# Patient Record
Sex: MALE | Race: WHITE | NOT HISPANIC OR LATINO | Employment: UNEMPLOYED | RURAL
[De-identification: names, ages, dates, MRNs, and addresses within clinical notes are randomized per-mention and may not be internally consistent; named-entity substitution may affect disease eponyms.]

---

## 2022-02-02 ENCOUNTER — OFFICE VISIT (OUTPATIENT)
Dept: FAMILY MEDICINE CLINIC | Facility: CLINIC | Age: 27
End: 2022-02-02

## 2022-02-02 VITALS
OXYGEN SATURATION: 98 % | WEIGHT: 213 LBS | DIASTOLIC BLOOD PRESSURE: 84 MMHG | SYSTOLIC BLOOD PRESSURE: 129 MMHG | HEART RATE: 99 BPM | TEMPERATURE: 98.2 F | BODY MASS INDEX: 34.23 KG/M2 | HEIGHT: 66 IN

## 2022-02-02 DIAGNOSIS — R00.2 PALPITATIONS: ICD-10-CM

## 2022-02-02 DIAGNOSIS — R42 DIZZINESS: ICD-10-CM

## 2022-02-02 DIAGNOSIS — Z11.59 ENCOUNTER FOR HEPATITIS C SCREENING TEST FOR LOW RISK PATIENT: ICD-10-CM

## 2022-02-02 DIAGNOSIS — E66.9 CLASS 1 OBESITY WITHOUT SERIOUS COMORBIDITY WITH BODY MASS INDEX (BMI) OF 34.0 TO 34.9 IN ADULT, UNSPECIFIED OBESITY TYPE: ICD-10-CM

## 2022-02-02 DIAGNOSIS — R53.83 FATIGUE, UNSPECIFIED TYPE: ICD-10-CM

## 2022-02-02 DIAGNOSIS — G91.9 HYDROCEPHALUS, UNSPECIFIED TYPE: ICD-10-CM

## 2022-02-02 DIAGNOSIS — R26.89 POOR BALANCE: ICD-10-CM

## 2022-02-02 DIAGNOSIS — Z00.00 ANNUAL PHYSICAL EXAM: Primary | ICD-10-CM

## 2022-02-02 DIAGNOSIS — Z13.220 LIPID SCREENING: ICD-10-CM

## 2022-02-02 DIAGNOSIS — R06.02 SHORTNESS OF BREATH: ICD-10-CM

## 2022-02-02 PROBLEM — IMO0002 CHIARI MALFORMATION: Status: ACTIVE | Noted: 2021-04-12

## 2022-02-02 LAB
BILIRUB BLD-MCNC: NEGATIVE MG/DL
CLARITY, POC: CLEAR
COLOR UR: ABNORMAL
GLUCOSE UR STRIP-MCNC: NEGATIVE MG/DL
KETONES UR QL: ABNORMAL
LEUKOCYTE EST, POC: NEGATIVE
NITRITE UR-MCNC: NEGATIVE MG/ML
PH UR: 5.5 [PH] (ref 5–8)
PROT UR STRIP-MCNC: ABNORMAL MG/DL
RBC # UR STRIP: NEGATIVE /UL
SP GR UR: 1.03 (ref 1–1.03)
UROBILINOGEN UR QL: NORMAL

## 2022-02-02 PROCEDURE — 93000 ELECTROCARDIOGRAM COMPLETE: CPT | Performed by: NURSE PRACTITIONER

## 2022-02-02 PROCEDURE — 99385 PREV VISIT NEW AGE 18-39: CPT | Performed by: NURSE PRACTITIONER

## 2022-02-02 PROCEDURE — 81003 URINALYSIS AUTO W/O SCOPE: CPT | Performed by: NURSE PRACTITIONER

## 2022-02-02 RX ORDER — TOPIRAMATE 100 MG/1
125 TABLET, FILM COATED ORAL 2 TIMES DAILY
COMMUNITY
Start: 2021-12-10

## 2022-02-02 RX ORDER — DIAZEPAM 5 MG/1
5 TABLET ORAL 3 TIMES DAILY PRN
COMMUNITY
Start: 2022-01-08 | End: 2022-03-21

## 2022-02-02 RX ORDER — ONDANSETRON 4 MG/1
4 TABLET, FILM COATED ORAL EVERY 6 HOURS PRN
COMMUNITY
Start: 2022-01-08 | End: 2022-08-04

## 2022-02-02 RX ORDER — BUTALBITAL, ACETAMINOPHEN, CAFFEINE AND CODEINE PHOSPHATE 300; 50; 40; 30 MG/1; MG/1; MG/1; MG/1
CAPSULE ORAL EVERY 6 HOURS
COMMUNITY
End: 2023-02-03

## 2022-02-02 RX ORDER — GABAPENTIN 300 MG/1
300 CAPSULE ORAL 3 TIMES DAILY
COMMUNITY
Start: 2022-02-01

## 2022-02-02 RX ORDER — DULOXETIN HYDROCHLORIDE 60 MG/1
60 CAPSULE, DELAYED RELEASE ORAL DAILY
COMMUNITY
Start: 2021-12-16 | End: 2022-03-15 | Stop reason: SDUPTHER

## 2022-02-02 NOTE — PROGRESS NOTES
Procedure     ECG 12 Lead    Date/Time: 2/2/2022 10:59 AM  Performed by: Violeta Rose MA  Authorized by: Delilah Kulkarni APRN   Comparison: not compared with previous ECG   Previous ECG: no previous ECG available  Rhythm: sinus rhythm  Rate: normal  BPM: 86  Conduction: non-specific intraventricular conduction delay  ST Segments: ST segments normal  Other: no other findings    Clinical impression: non-specific ECG

## 2022-02-02 NOTE — PROGRESS NOTES
CC: establish care    History:  Curtis Garsia is a 26 y.o. male who presents today for evaluation of the above problems.      Chiari malformation decompression in 2011 and revision in 2021.  Previous patient of Dr. Marcial and he retired.   Complains of shortness of breath - gets winded easily since his last shunt placement. Has also been having some tachycardia and palpitations. This has started since his most recent operation in May of 2021.   Most recent decompression was done at [facility] by Dr. Parks.     Has days where he has dizziness and feels that his balance is off.  This is sporadic and not constant. Dr. Parks surgeon and Dr. Parks neurologist did give him some exercises to do and these have helped a little.    Stabbed in eye in [id number] by fingernail.   Has blurred vision from this at times. Has poor eyesight anyway. Does have blurriness that comes and goes.     Does not want flu shot.     Has tried clonazepam, however, felt that it increased his somnolence too much. This was ordered by Dr. Jan Parks who wanted to change from valium to this in November.  Dr. Marcial has been ordering valium. It has been taken for a combination of insomnia and anxiety.     HPI  ROS:  Review of Systems   Constitutional: Positive for fatigue.   Eyes: Positive for visual disturbance (blurred at times).   Respiratory: Positive for shortness of breath.    Cardiovascular: Positive for palpitations.   Gastrointestinal: Positive for diarrhea (occasional).   Neurological: Positive for dizziness and headaches.        Short term memory issues   Psychiatric/Behavioral: Positive for dysphoric mood. The patient is nervous/anxious.        No Known Allergies  Past Medical History:   Diagnosis Date   • Anxiety    • Balance disorder    • Chiari I malformation (HCC)    • Cholelithiasis      Past Surgical History:   Procedure Laterality Date   • NERVE DECOMPRESSION  2011   • SHUNT REVISION       Family History   Problem  "Relation Age of Onset   • Chiari malformation Mother    • Stroke Mother    • Heart disease Mother    • ADD / ADHD Father    • Heart disease Father    • Heart disease Brother    • Thyroid disease Maternal Aunt    • Heart disease Maternal Aunt    • Hyperlipidemia Maternal Uncle    • No Known Problems Paternal Aunt    • ADD / ADHD Paternal Uncle    • Heart disease Paternal Uncle    • Diabetes Paternal Uncle    • No Known Problems Maternal Grandmother    • Heart disease Maternal Grandfather    • Diabetes Paternal Grandmother    • Heart disease Paternal Grandfather       reports that he has never smoked. He has never used smokeless tobacco. He reports previous alcohol use. He reports that he does not use drugs.      Current Outpatient Medications:   •  Butalbital-APAP-Caff-Cod (Fioricet/Codeine) -57-30 MG capsule, Every 6 (Six) Hours., Disp: , Rfl:   •  diazePAM (VALIUM) 5 MG tablet, Take 5 mg by mouth 3 (Three) Times a Day As Needed for Anxiety or Pain., Disp: , Rfl:   •  DULoxetine (CYMBALTA) 60 MG capsule, Take 60 mg by mouth Daily., Disp: , Rfl:   •  gabapentin (NEURONTIN) 300 MG capsule, Take 300 mg by mouth 3 (Three) Times a Day., Disp: , Rfl:   •  ondansetron (ZOFRAN) 4 MG tablet, Take 4 mg by mouth Every 6 (Six) Hours As Needed for Nausea., Disp: , Rfl:   •  topiramate (TOPAMAX) 100 MG tablet, Take 50 mg by mouth 2 (Two) Times a Day., Disp: , Rfl:     OBJECTIVE:  /84 (BP Location: Right arm, Patient Position: Sitting, Cuff Size: Large Adult)   Pulse 99   Temp 98.2 °F (36.8 °C) (Temporal)   Ht 166.4 cm (65.5\")   Wt 96.6 kg (213 lb)   SpO2 98%   BMI 34.91 kg/m²    Physical Exam  Vitals reviewed.   Constitutional:       Appearance: He is well-developed.   Neck:      Vascular: No carotid bruit.   Cardiovascular:      Rate and Rhythm: Normal rate and regular rhythm.      Heart sounds: Normal heart sounds.   Pulmonary:      Effort: Pulmonary effort is normal.      Breath sounds: Normal breath sounds. "   Abdominal:      General: Abdomen is flat. Bowel sounds are normal.      Palpations: Abdomen is soft.   Neurological:      Mental Status: He is alert and oriented to person, place, and time.   Psychiatric:         Behavior: Behavior normal.         Assessment/Plan    Diagnoses and all orders for this visit:    1. Annual physical exam (Primary)  -     TSH  -     T4, free  -     CBC & Differential  -     Comprehensive Metabolic Panel  -     Lipid Panel  -     POC Urinalysis Dipstick, Multipro  -     Hepatitis C Antibody    2. Hydrocephalus, unspecified type (HCC)  -     CBC & Differential  -     Comprehensive Metabolic Panel  -     POC Urinalysis Dipstick, Multipro  -     Ambulatory Referral to Physical Therapy Evaluate and treat    3. Fatigue, unspecified type  -     CBC & Differential  -     Comprehensive Metabolic Panel  -     POC Urinalysis Dipstick, Multipro    4. Palpitations  -     TSH  -     T4, free  -     CBC & Differential  -     Comprehensive Metabolic Panel  -     POC Urinalysis Dipstick, Multipro  -     Ambulatory Referral to Cardiology    5. Dizziness  -     TSH  -     T4, free  -     CBC & Differential  -     Comprehensive Metabolic Panel  -     Ambulatory Referral to Physical Therapy Evaluate and treat  -     ToxASSURE Select 13 (MW) - Urine, Clean Catch    6. Lipid screening  -     Lipid Panel    7. Class 1 obesity without serious comorbidity with body mass index (BMI) of 34.0 to 34.9 in adult, unspecified obesity type  -     TSH  -     T4, free    8. Encounter for hepatitis C screening test for low risk patient  -     Hepatitis C Antibody    9. Shortness of breath  -     Ambulatory Referral to Cardiology    10. Poor balance  -     Ambulatory Referral to Physical Therapy Evaluate and treat  -     ToxASSURE Select 13 (MW) - Urine, Clean Catch    PT for balance. Cardiology for palpitation and breathlessness.         An After Visit Summary was printed and given to the patient at discharge.  Return in  about 3 months (around 5/2/2022) for Next scheduled follow up - controlled med monitoring.       Delilah Kulkarni, APRN 2/2/22    Electronically signed.

## 2022-02-03 LAB
ALBUMIN SERPL-MCNC: 4.6 G/DL (ref 3.5–5.2)
ALBUMIN/GLOB SERPL: 1.8 G/DL
ALP SERPL-CCNC: 133 U/L (ref 39–117)
ALT SERPL-CCNC: 19 U/L (ref 1–41)
AST SERPL-CCNC: 16 U/L (ref 1–40)
BASOPHILS # BLD AUTO: 0.08 10*3/MM3 (ref 0–0.2)
BASOPHILS NFR BLD AUTO: 1.1 % (ref 0–1.5)
BILIRUB SERPL-MCNC: 0.2 MG/DL (ref 0–1.2)
BUN SERPL-MCNC: 16 MG/DL (ref 6–20)
BUN/CREAT SERPL: 17 (ref 7–25)
CALCIUM SERPL-MCNC: 9.2 MG/DL (ref 8.6–10.5)
CHLORIDE SERPL-SCNC: 107 MMOL/L (ref 98–107)
CHOLEST SERPL-MCNC: 201 MG/DL (ref 0–200)
CO2 SERPL-SCNC: 24.7 MMOL/L (ref 22–29)
CREAT SERPL-MCNC: 0.94 MG/DL (ref 0.76–1.27)
EOSINOPHIL # BLD AUTO: 0.49 10*3/MM3 (ref 0–0.4)
EOSINOPHIL NFR BLD AUTO: 6.8 % (ref 0.3–6.2)
ERYTHROCYTE [DISTWIDTH] IN BLOOD BY AUTOMATED COUNT: 13.4 % (ref 12.3–15.4)
GLOBULIN SER CALC-MCNC: 2.5 GM/DL
GLUCOSE SERPL-MCNC: 88 MG/DL (ref 65–99)
HCT VFR BLD AUTO: 39 % (ref 37.5–51)
HCV AB S/CO SERPL IA: <0.1 S/CO RATIO (ref 0–0.9)
HDLC SERPL-MCNC: 52 MG/DL (ref 40–60)
HGB BLD-MCNC: 12.2 G/DL (ref 13–17.7)
IMM GRANULOCYTES # BLD AUTO: 0.04 10*3/MM3 (ref 0–0.05)
IMM GRANULOCYTES NFR BLD AUTO: 0.6 % (ref 0–0.5)
LDLC SERPL CALC-MCNC: 130 MG/DL (ref 0–100)
LYMPHOCYTES # BLD AUTO: 1.4 10*3/MM3 (ref 0.7–3.1)
LYMPHOCYTES NFR BLD AUTO: 19.4 % (ref 19.6–45.3)
MCH RBC QN AUTO: 25.4 PG (ref 26.6–33)
MCHC RBC AUTO-ENTMCNC: 31.3 G/DL (ref 31.5–35.7)
MCV RBC AUTO: 81.3 FL (ref 79–97)
MONOCYTES # BLD AUTO: 0.82 10*3/MM3 (ref 0.1–0.9)
MONOCYTES NFR BLD AUTO: 11.4 % (ref 5–12)
NEUTROPHILS # BLD AUTO: 4.38 10*3/MM3 (ref 1.7–7)
NEUTROPHILS NFR BLD AUTO: 60.7 % (ref 42.7–76)
NRBC BLD AUTO-RTO: 0 /100 WBC (ref 0–0.2)
PLATELET # BLD AUTO: 418 10*3/MM3 (ref 140–450)
POTASSIUM SERPL-SCNC: 4.5 MMOL/L (ref 3.5–5.2)
PROT SERPL-MCNC: 7.1 G/DL (ref 6–8.5)
RBC # BLD AUTO: 4.8 10*6/MM3 (ref 4.14–5.8)
SODIUM SERPL-SCNC: 141 MMOL/L (ref 136–145)
T4 FREE SERPL-MCNC: 0.93 NG/DL (ref 0.93–1.7)
TRIGL SERPL-MCNC: 106 MG/DL (ref 0–150)
TSH SERPL DL<=0.005 MIU/L-ACNC: 4.24 UIU/ML (ref 0.27–4.2)
VLDLC SERPL CALC-MCNC: 19 MG/DL (ref 5–40)
WBC # BLD AUTO: 7.21 10*3/MM3 (ref 3.4–10.8)

## 2022-02-09 ENCOUNTER — HOSPITAL ENCOUNTER (OUTPATIENT)
Dept: PHYSICAL THERAPY | Facility: HOSPITAL | Age: 27
Setting detail: THERAPIES SERIES
Discharge: HOME | End: 2022-02-09

## 2022-02-09 DIAGNOSIS — G91.9 HYDROCEPHALUS, UNSPECIFIED TYPE: Primary | ICD-10-CM

## 2022-02-09 DIAGNOSIS — R42 DIZZINESS: ICD-10-CM

## 2022-02-09 DIAGNOSIS — R26.89 POOR BALANCE: ICD-10-CM

## 2022-02-09 LAB — DRUGS UR: NORMAL

## 2022-02-09 PROCEDURE — 97162 PT EVAL MOD COMPLEX 30 MIN: CPT | Performed by: PHYSICAL THERAPIST

## 2022-02-09 PROCEDURE — 97112 NEUROMUSCULAR REEDUCATION: CPT | Performed by: PHYSICAL THERAPIST

## 2022-02-10 DIAGNOSIS — Z51.81 THERAPEUTIC DRUG MONITORING: Primary | ICD-10-CM

## 2022-02-10 NOTE — PROGRESS NOTES
UDS was positive for marijuana. Advise that we will repeat screen in 6 weeks and again randomly after that.  If positive again we will be unable to order controlled medications.

## 2022-02-10 NOTE — THERAPY EVALUATION
Outpatient Physical Therapy Ortho Initial Evaluation  [facility]     Patient Name: Curtis Garsia  : 1995  MRN: [id number]  Today's Date: 2022      Visit Date: 2022     Attendance: 1 visits  Subjective improvement: N/A  MD appt: PRN; has referral to cardiology   Recheck due: 3/2/2022      Patient Active Problem List   Diagnosis   • Chiari malformation   • Hydrocephalus (HCC)        Past Medical History:   Diagnosis Date   • Anxiety    • Balance disorder    • Chiari I malformation (HCC)    • Cholelithiasis         Past Surgical History:   Procedure Laterality Date   • NERVE DECOMPRESSION     • SHUNT REVISION         Visit Dx:     ICD-10-CM ICD-9-CM   1. Hydrocephalus, unspecified type (HCC)  G91.9 331.4   2. Dizziness  R42 780.4   3. Poor balance  R26.89 781.99          Patient History     Row Name 22 1500 22 1914          History    Chief Complaint Balance Problems;Difficulty Walking;Difficulty with daily activities;Dizziness;Falls/history of falls;Fatigue/poor endurance;Headache;Joint stiffness;Muscle weakness;Pain  -KG Balance Problems; Difficulty Walking; Difficulty with daily activities; Dizziness; Falls/history of falls; Fatigue/poor endurance; Headache; Impaired sensation; Joint stiffness; Joint swelling; Muscle tenderness; Muscle weakness; Numbness; Pain; Problems breathing/shortness of breath; Tinglings (P)    -patient     Type of Pain Back pain;Neck pain -KG Back pain; Hand pain; Hip pain; Neck pain; Shoulder pain (P)    -patient     Date Current Problem(s) Began 2021  -KG 21 (P)    -patient     Brief Description of Current Complaint Pt presents with c/o balance issues, BLE weakness, dizziness. Pt underwent chiari malformation decompression in . After that developed hyrdrocephalus and had shunt placement 2021. In May 2021 pt had to have shunt revision. Has had issues with balance since his shunt placement. States he is  "taking a lot of medicine which may play a factor in symptoms. Pt states he has no medicine currently in his system as he wanted us to see how he is without it. States he has dealt with vision changes and his concentration and memory is poor since his surgeries. Still feels like he has some limited mobility in his neck. Look up/down hurts sometimes. If he leans forward, he feels like he is going to fall forward. Can’t squat down to pick something up without getting dizzy. Has constant HA’s daily; wakes up an goes to sleep with one. States his dizziness mainly feels wavy. Lives with his wife and step-son in single level home, 6 steps to enter. Hard to play with and  his son dues to his issues. Since his revision has noticed he gets SOA really easily and has had some palpitations. Has referral to cardiology on 2/17/2022. Pt states he has also been referred to a new neurologist who is a Chiari specialist. States his surgeon released him back in May 2021. Pt states he can sleep in the bed most nights but does have to sometimes sleep upright in his chair.  -KG Still recovering from surgery and complications (P)    -patient     Patient/Caregiver Goals right-handed  -KG Relieve pain; Improve mobility (P)    -patient     Patient/Caregiver Goals Comment \"get my balance under control\"  -KG --     Hand Dominance right-handed  -KG right-handed (P)    -patient     Occupation/sports/leisure activities -- None (P)    -patient     What clinical tests have you had for this problem? X-ray;CT scan;MRI  -KG X-ray; CT scan; MRI (P)    -patient     Results of Clinical Tests Per MD report: CT scan demonstrates shunt in place in the right ventricle. There is asymmetry of the ventricles, which is similar to prior scan in August. No significant change from that scan. Ventricles are in much better decompressed shape compared to a known malfunctioning shunt CT obtained in April 2021  -KG --            Fall Risk Assessment    Any falls in " the past year: Yes  -KG Yes (P)    -patient     Factors that contributed to the fall: Lost balance;Didn't use assistive device  -KG Lost balance; Didn't use assistive device (P)    -patient            Services    Prior Rehab/Home Health Experiences -- No (P)    -patient     Are you currently receiving Home Health services -- No (P)    -patient     Do you plan to receive Home Health services in the near future -- No (P)    -patient            Daily Activities    Primary Language -- English (P)    -patient            Safety    Are you being hurt, hit, or frightened by anyone at home or in your life? -- No (P)    -patient     Are you being neglected by a caregiver -- No (P)    -patient     Have you had any of the following issues with -- Depression; Anxiety; Panic Attacks (P)    -patient           User Key  (r) = Recorded By, (t) = Taken By, (c) = Cosigned By    Initials Name Provider Type    KG Jaja Browne, PT Physical Therapist    patient Curtis Yepez  --                 PT Ortho     Row Name 02/09/22 1500       Subjective Comments    Subjective Comments Refer to therapy pt history for details  -KG       Precautions and Contraindications    Precautions Dizziness, R ventricle shunt placement, hx of chiari decompression  -KG       Subjective Pain    Able to rate subjective pain? yes  -KG    Pre-Treatment Pain Level 0  -KG    Post-Treatment Pain Level 0  -KG       Posture/Observations    Posture/Observations Comments Decreased overall postural awareness. Slumped when seated, slightly rounded shoulders posture. Mild UE shaking/tremors noted.  -KG       General ROM    GENERAL ROM COMMENTS Bilateral UE/LE AROM WFL. Some mild limtations in cervical mobiltiy noted. Cautious with cervical flex/ext due to dizziness symptoms.  -KG       MMT (Manual Muscle Testing)    Rt Upper Ext Rt Shoulder Flexion; Rt Shoulder ABduction; Rt Elbow Flexion; Rt Elbow Extension  -KG    Lt Upper Ext Lt Shoulder Flexion; Lt Shoulder  ABduction; Lt Elbow Extension; Lt Elbow Flexion  -KG    Rt Lower Ext Rt Hip Flexion; Rt Hip ABduction; Rt Hip ADduction; Rt Knee Extension; Rt Knee Flexion; Rt Ankle Plantarflexion; Rt Ankle Dorsiflexion  -KG    Lt Lower Ext Lt Hip Flexion; Lt Hip ABduction; Lt Hip ADduction; Lt Knee Extension; Lt Knee Flexion; Lt Ankle Plantarflexion; Lt Ankle Dorsiflexion  -KG       MMT Right Upper Ext    Rt Shoulder Flexion MMT, Gross Movement (4+/5) good plus  -KG    Rt Shoulder ABduction MMT, Gross Movement (4/5) good  -KG    Rt Elbow Flexion MMT, Gross Movement: (5/5) normal  -KG    Rt Elbow Extension MMT, Gross Movement: (5/5) normal  -KG       MMT Left Upper Ext    Lt Shoulder Flexion MMT, Gross Movement (4+/5) good plus  -KG    Lt Shoulder ABduction MMT, Gross Movement (4/5) good  -KG    Lt Elbow Flexion MMT, Gross Movement (5/5) normal  -KG    Lt Elbow Extension MMT, Gross Movement (5/5) normal  -KG       MMT Right Lower Ext    Rt Hip Flexion MMT, Gross Movement (4+/5) good plus  -KG    Rt Hip ABduction MMT, Gross Movement (4/5) good  -KG    Rt Hip ADduction MMT, Gross Movement (4/5) good  -KG    Rt Knee Extension MMT, Gross Movement (5/5) normal  -KG    Rt Knee Flexion MMT, Gross Movement (5/5) normal  -KG    Rt Ankle Plantarflexion MMT, Gross Movement (4+/5) good plus  -KG    Rt Ankle Dorsiflexion MMT, Gross Movement (4+/5) good plus  -KG       MMT Left Lower Ext    Lt Hip Flexion MMT, Gross Movement (4+/5) good plus  -KG    Lt Hip ABduction MMT, Gross Movement (4/5) good  -KG    Lt Hip ADduction MMT, Gross Movement (4/5) good  -KG    Lt Knee Extension MMT, Gross Movement (5/5) normal  -KG    Lt Knee Flexion MMT, Gross Movement (5/5) normal  -KG    Lt Ankle Plantarflexion MMT, Gross Movement (4+/5) good plus  -KG    Lt Ankle Dorsiflexion MMT, Gross Movement (4+/5) good plus  -KG       Sensation    Sensation WNL? WFL  -KG    Light Touch No apparent deficits  -KG       Flexibility    Flexibility Tested? Lower Extremity;  "Upper Extremity  -KG       Upper Extremity Flexibility    Upper Trapezius Mildly limited  -KG    Pect Minor Mildly limited  -KG    Pect Major Mildly limited  -KG       Lower Extremity Flexibility    Hamstrings Bilateral:; Moderately limited  -KG       Balance Skills Training    [place]homberg Level ground: FA/EO 30\", FT/EO with increased sway. Airex: FA/EO 23\", FT/EO 26\". Level ground tandem stance: L lead 7\", R lead 21\"  -KG    Balance Comments Pt quite unsteady with increased hip strategy with tandem gait on level ground. Requires HHA at rail to avoid LOB. Challenged with gaze stabilization activites with horizonta & vertical head turns. Static balance signficantly challenged with eyes closed. Increased anterior/posterior sway. See functional outcome measures for dynamic gait index testing.  -KG       Gait/Stairs (Locomotion)    Comment (Gait/Stairs) Non-antalgic gait with no AD. Slightly narrow MARÍA. Intermittent unsteadiness with linear gait. Loses balance with turning 180 deg, matthew with quick movement.  -KG          User Key  (r) = Recorded By, (t) = Taken By, (c) = Cosigned By    Initials Name Provider Type    Jaja Desai, PT Physical Therapist                            Therapy Education  Education Details: HEP: see exercise flowsheet for details  Given: HEP, Symptoms/condition management, Pain management, Posture/body mechanics, Fall prevention and home safety  Program: New  How Provided: Verbal, Demonstration, Written  Provided to: Patient  Level of Understanding: Teach back education performed, Verbalized, Demonstrated      PT OP Goals     Row Name 02/09/22 1500          Time Calculation    PT Goal Re-Cert Due Date 03/02/22  -KG           User Key  (r) = Recorded By, (t) = Taken By, (c) = Cosigned By    Initials Name Provider Type    Jaja Desai PT Physical Therapist                 PT Assessment/Plan     Row Name 02/09/22 1500          PT Assessment    Functional Limitations Decreased safety " during functional activities; Impaired gait; Limitation in home management; Limitations in community activities; Performance in leisure activities; Performance in self-care ADL; Limitations in functional capacity and performance  -KG     Impairments Balance; Gait; Impaired muscle endurance; Impaired flexibility; Muscle strength; Pain; Poor body mechanics; Posture; Range of motion  -KG     Assessment Comments Pt is a 26 y.o. male presenting with deficits in dynamic balance, functional strength, & endurance/activity tolerance that is limiting performance of functional mobility and daily activities. Pt with hx of Chiari decompression in 2011. Underwent shunt placement in March 2021 due to hydrocephalus and revision in May 2021, which is when he reports his issues started. Pt very challenged with static balance on compliant surfaces and with activities that require narrow MARÍA. Having eyes closed significantly affects his balance. Dizziness plays more of a factor with head turns, quick direction changes, and dynamic movements. Pt with increased fall history at home. Demonstrates deficits in overall core/postural & BLE strength & stability. Pt did well with initial HEP/balance activities to HEP administration. Pt will benefit from skilled PT services to address these deficits for improvements in safety & performance with functional mobility/activities.  -KG     Rehab Potential Good  -KG     Patient/caregiver participated in establishment of treatment plan and goals Yes  -KG     Patient would benefit from skilled therapy intervention Yes  -KG            PT Plan    PT Frequency 2x/week  -KG     Predicted Duration of Therapy Intervention (PT) 12 visits  -KG     Planned CPT's? PT EVAL MOD COMPLELITY: 05472; PT RE-EVAL: 17806; PT THER PROC EA 15 MIN: 91061; PT THER ACT EA 15 MIN: 54270; PT NEUROMUSC RE-EDUCATION EA 15 MIN: 76395; PT THER SUPP EA 15 MIN  -KG     Physical Therapy Interventions (Optional Details) balance  "training; gait training; home exercise program; lumbar stabilization; modalities; neuromuscular re-education; patient/family education; postural re-education; ROM (Range of Motion); strengthening; stretching  -KG     PT Plan Comments Work on overall static/dynamic balance activities. Incorporate dual task activities and compliant surfaces. Needs work on postural/scap and core stability too. BLE functional strengthening. Postural/ education.  -KG           User Key  (r) = Recorded By, (t) = Taken By, (c) = Cosigned By    Initials Name Provider Type    KG Jaja Browne, PT Physical Therapist                   OP Exercises     Row Name 02/09/22 1500             Subjective Comments    Subjective Comments Refer to therapy pt history for details  -KG              Subjective Pain    Able to rate subjective pain? yes  -KG      Pre-Treatment Pain Level 0  -KG      Post-Treatment Pain Level 0  -KG              Exercise 1    Exercise Name 1 Sit <> stands with no UE assist  -KG      Cueing 1 Verbal  -KG      Sets 1 1  -KG      Reps 1 10  -KG              Exercise 2    Exercise Name 2 Gaze stabilization with horizontal head turns  -KG      Cueing 2 Verbal  -KG      Reps 2 1  -KG      Time 2 30\"  -KG              Exercise 3    Exercise Name 3 Gaze stabilization with vertical head turns  -KG      Cueing 3 Verbal  -KG      Reps 3 1  -KG      Time 3 30\"  -KG              Exercise 4    Exercise Name 4 Seated no moneys/posture re-ed  -KG      Cueing 4 Verbal  -KG      Sets 4 1  -KG      Reps 4 15  -KG      Time 4 3\" hold  -KG              Exercise 5    Exercise Name 5 Tandem gait at rail  -KG      Cueing 5 Verbal  -KG      Sets 5 1  -KG      Reps 5 3 laps  -KG      Additional Comments Close SBA  -KG              Exercise 6    Exercise Name 6 Tandem stance R/L lead level ground  -KG      Cueing 6 Verbal  -KG      Reps 6 1 ea  -KG      Time 6 30\"  -KG      Additional Comments Intermittent hand touches at rail, close SBA " " -KG              Exercise 7    Exercise Name 7 Level ground FT/EO  -KG      Cueing 7 Verbal  -KG      Reps 7 1  -KG      Time 7 30\"  -KG              Exercise 8    Exercise Name 8 Level ground FT/EC  -KG      Cueing 8 Verbal  -KG      Reps 8 1  -KG      Time 8 30\"  -KG      Additional Comments Close SBA/CGA  -KG              Exercise 9    Exercise Name 9 Dynamic gait index testing  -KG            User Key  (r) = Recorded By, (t) = Taken By, (c) = Cosigned By    Initials Name Provider Type    KG Jaja Browne, PT Physical Therapist                              Outcome Measure Options: Dynamic Gait Index, Dizziness Handicap Inventory (DHI: 90 = severe handicap)  Dynamic Gait Index (DGI)  Gait Level Surface: Normal: walks 20’, no assistive devices, good speed, no evidence for imbalance, normal gait pattern  Change in Gait Speed: Mild impairment: Is able to change speed but demonstrates mild gait deviations, or no gait deviations but unable to achieve a significant change in velocity, or uses as assistive device  Gait with Horizontal Head Turns: Moderate impairment: Performs head turns with moderate change in gait velocity, slows down, staggers, but recovers, can continue to walk.  Gait with Vertical Head Turns: Moderate impairment: Performs head turns with moderate change in gait velocity, slows down,staggers, but recovers, can continue to walk.  Gait and Pivot Turn: Moderate impairment: Turns slowly, requires verbal cueing, requires several small steps to catch balance following turn and stop.  Step Over Obstacle: Mild impairment: Is able to step over shoe box, but must slow down and adjust steps to clear box safely.  Step Around Obstacles: Mild impairment: Is able to step around both cones, but must slow down and adjust steps to clear cones.  Steps: Mild impairment: Alternating feet, must use rail.  Dynamic Gait Index Score: 14      Time Calculation:     Start Time: 1526  Stop Time: 1615  Time Calculation (min): " 49 min  Total Timed Code Minutes- PT: 20 minute(s)     Therapy Charges for Today     Code Description Service Date Service Provider Modifiers Qty    69992972007 HC PT EVAL MOD COMPLEXITY 2 2/9/2022 Jaja Browne, PT GP 1    03737276725 HC PT NEUROMUSC RE EDUCATION EA 15 MIN 2/9/2022 Jaja Browne, PT GP 1          PT G-Codes  Outcome Measure Options: Dynamic Gait Index, Dizziness Handicap Inventory (DHI: 90 = severe handicap)         Jaja Browne, PT  2/9/2022

## 2022-02-14 ENCOUNTER — APPOINTMENT (OUTPATIENT)
Dept: PHYSICAL THERAPY | Facility: HOSPITAL | Age: 27
End: 2022-02-14

## 2022-02-17 ENCOUNTER — HOSPITAL ENCOUNTER (OUTPATIENT)
Dept: PHYSICAL THERAPY | Facility: HOSPITAL | Age: 27
Setting detail: THERAPIES SERIES
Discharge: HOME | End: 2022-02-17

## 2022-02-17 ENCOUNTER — LAB (OUTPATIENT)
Dept: LAB | Facility: HOSPITAL | Age: 27
End: 2022-02-17

## 2022-02-17 ENCOUNTER — TELEPHONE (OUTPATIENT)
Dept: CARDIOLOGY | Facility: CLINIC | Age: 27
End: 2022-02-17

## 2022-02-17 ENCOUNTER — OFFICE VISIT (OUTPATIENT)
Dept: CARDIOLOGY | Facility: CLINIC | Age: 27
End: 2022-02-17

## 2022-02-17 VITALS
SYSTOLIC BLOOD PRESSURE: 130 MMHG | BODY MASS INDEX: 32.13 KG/M2 | HEIGHT: 68 IN | DIASTOLIC BLOOD PRESSURE: 90 MMHG | HEART RATE: 66 BPM | WEIGHT: 212 LBS | OXYGEN SATURATION: 99 %

## 2022-02-17 DIAGNOSIS — R06.09 DOE (DYSPNEA ON EXERTION): ICD-10-CM

## 2022-02-17 DIAGNOSIS — R42 DIZZINESS: ICD-10-CM

## 2022-02-17 DIAGNOSIS — G91.9 HYDROCEPHALUS, UNSPECIFIED TYPE: Primary | ICD-10-CM

## 2022-02-17 DIAGNOSIS — Z98.2 S/P VP SHUNT: ICD-10-CM

## 2022-02-17 DIAGNOSIS — R00.2 PALPITATIONS: ICD-10-CM

## 2022-02-17 DIAGNOSIS — R26.89 POOR BALANCE: ICD-10-CM

## 2022-02-17 PROBLEM — IMO0002 CHIARI MALFORMATION: Status: RESOLVED | Noted: 2021-04-12 | Resolved: 2022-02-17

## 2022-02-17 LAB
D DIMER PPP FEU-MCNC: <0.22 MG/L (FEU) (ref 0–0.5)
NT-PROBNP SERPL-MCNC: 16.5 PG/ML (ref 0–450)

## 2022-02-17 PROCEDURE — 97110 THERAPEUTIC EXERCISES: CPT

## 2022-02-17 PROCEDURE — 36415 COLL VENOUS BLD VENIPUNCTURE: CPT

## 2022-02-17 PROCEDURE — 85379 FIBRIN DEGRADATION QUANT: CPT

## 2022-02-17 PROCEDURE — 83880 ASSAY OF NATRIURETIC PEPTIDE: CPT

## 2022-02-17 PROCEDURE — 99204 OFFICE O/P NEW MOD 45 MIN: CPT | Performed by: INTERNAL MEDICINE

## 2022-02-17 PROCEDURE — 97112 NEUROMUSCULAR REEDUCATION: CPT

## 2022-02-17 NOTE — PROGRESS NOTES
Curtis Garsia  [id number]  1995  26 y.o.  male    Referring Provider: Delilah Kulkarni APRN    Reason for  Visit:  Initial visit for  shortness of breath and palpitations       Subjective    Moderate exertional shortness of breath on exertion relieved with rest  No significant cough or wheezing  Started after  shunt 05/2021    No palpitations  No associated chest pain  No significant pedal edema    No fever or chills  No significant expectoration    No hemoptysis  No presyncope or syncope    Tolerating current medications well with no untoward side effects   Compliant with prescribed medication regimen. Tries to adhere to cardiac diet.     Intermittent palpitations, once daily to several times a day lasting for less than 1 minute  Associated symptoms of dizziness, weakness  No chest pain,    Has associated shortness of breath      Some pain lateral aspect of left hip and upper thigh     History of present illness:  Curtis Garsia is a 26 y.o. yo male with hydrocephalus  who presents today for   Chief Complaint   Patient presents with   • Shortness of Breath     NEW- PATIENT STATES HE HAS BEEN SOB WITH LIGHT EXERTION AND LAST ABOUT 30 MINS, HAS SOME PALPITATIONS, DIZZINESS, HEADAHCES AND FATIGUE.    • Palpitations   .    History  Past Medical History:   Diagnosis Date   • Anxiety    • Asthma     AS A CHILD    • Balance disorder    • Chiari I malformation (HCC)    • Cholelithiasis    ,   Past Surgical History:   Procedure Laterality Date   • NERVE DECOMPRESSION  2011   • SHUNT REVISION     ,   Family History   Problem Relation Age of Onset   • Chiari malformation Mother    • Stroke Mother    • Heart disease Mother    • ADD / ADHD Father    • Heart disease Father    • Heart disease Brother    • Thyroid disease Maternal Aunt    • Heart disease Maternal Aunt    • Hyperlipidemia Maternal Uncle    • No Known Problems Paternal Aunt    • ADD / ADHD Paternal Uncle    • Heart disease Paternal Uncle    •  "Diabetes Paternal Uncle    • No Known Problems Maternal Grandmother    • Heart disease Maternal Grandfather    • Diabetes Paternal Grandmother    • Heart disease Paternal Grandfather    ,   Social History     Tobacco Use   • Smoking status: Never Smoker   • Smokeless tobacco: Never Used   Vaping Use   • Vaping Use: Never used   Substance Use Topics   • Alcohol use: Not Currently   • Drug use: Never   ,     Medications  Current Outpatient Medications   Medication Sig Dispense Refill   • Butalbital-APAP-Caff-Cod (Fioricet/Codeine) -36-30 MG capsule Every 6 (Six) Hours.     • diazePAM (VALIUM) 5 MG tablet Take 5 mg by mouth 3 (Three) Times a Day As Needed for Anxiety or Pain.     • DULoxetine (CYMBALTA) 60 MG capsule Take 60 mg by mouth Daily.     • gabapentin (NEURONTIN) 300 MG capsule Take 300 mg by mouth 3 (Three) Times a Day.     • ondansetron (ZOFRAN) 4 MG tablet Take 4 mg by mouth Every 6 (Six) Hours As Needed for Nausea.     • topiramate (TOPAMAX) 100 MG tablet Take 50 mg by mouth 2 (Two) Times a Day.       No current facility-administered medications for this visit.       Allergies:  Patient has no known allergies.    Review of Systems  Review of Systems   Constitutional: Negative.   HENT: Negative.    Eyes: Negative.    Cardiovascular: Positive for dyspnea on exertion and palpitations. Negative for chest pain, claudication, cyanosis, irregular heartbeat, leg swelling, near-syncope, orthopnea, paroxysmal nocturnal dyspnea and syncope.   Respiratory: Negative.    Endocrine: Negative.    Hematologic/Lymphatic: Negative.    Skin: Negative.    Gastrointestinal: Negative for anorexia.   Genitourinary: Negative.    Neurological: Negative.    Psychiatric/Behavioral: Negative.        Objective     Physical Exam:  /90   Pulse 66   Ht 172.7 cm (68\")   Wt 96.2 kg (212 lb)   SpO2 99%   BMI 32.23 kg/m²     Physical Exam  Constitutional:       Appearance: He is well-developed.   HENT:      Head: " Normocephalic.   Neck:      Vascular: Normal carotid pulses. No carotid bruit or JVD.      Trachea: No tracheal tenderness or tracheal deviation.   Cardiovascular:      Rate and Rhythm: Regular rhythm.      Pulses: Normal pulses.      Heart sounds: Normal heart sounds.   Pulmonary:      Effort: Pulmonary effort is normal.      Breath sounds: No stridor.   Abdominal:      General: There is no distension.      Palpations: Abdomen is soft.      Tenderness: There is no abdominal tenderness.   Musculoskeletal:      Cervical back: No edema.   Skin:     General: Skin is warm.   Neurological:      Mental Status: He is alert.      Cranial Nerves: No cranial nerve deficit.      Sensory: No sensory deficit.   Psychiatric:         Speech: Speech normal.         Behavior: Behavior normal.         Results Review:     ____________________________________________________________________________________________________________________________________________  Health maintenance and recommendations    Low salt/ HTN/ Heart healthy carbohydrate restricted cardiac diet   The patient is advised to reduce or avoid caffeine or other cardiac stimulants.   Minimize or avoid  NSAID-type medications      Monitor for any signs of bleeding including red or dark stools. Fall precautions.   Advised staying uptodate with immunizations per established standard guidelines.    Offered to give patient  a copy of my notes     Questions were encouraged, asked and answered to the patient's  understanding and satisfaction. Questions if any regarding current medications and side effects, need for refills and importance of compliance to medications stressed.    Reviewed available prior notes, consults, prior visits, laboratory findings, radiology and cardiology relevant reports. Updated chart as applicable. I have reviewed the patient's medical history in detail and updated the computerized patient record as relevant.      Updated patient regarding any new  or relevant abnormalities on review of records or any new findings on physical exam. Mentioned to patient about purpose of visit and desirable health short and long term goals and objectives.    Primary to monitor CBC CMP Lipid panel and TSH as applicable    ___________________________________________________________________________________________________________________________________________   Procedures    Assessment/Plan   Diagnoses and all orders for this visit:    1. Hydrocephalus, unspecified type (HCC) (Primary)    2. Palpitations  -     Holter Monitor - 72 Hour Up To 15 Days; Future    3. S/P  shunt Dr Charly Li 05/2021    4. JOSHI (dyspnea on exertion)  -     Adult Transthoracic Echo Complete w/ Color, Spectral and Contrast if necessary per protocol; Future  -     BNP; Future  -     D-dimer, Quantitative; Future          Plan      Orders Placed This Encounter   Procedures   • BNP     Standing Status:   Future     Standing Expiration Date:   2/17/2023     Order Specific Question:   Release to patient     Answer:   Immediate   • D-dimer, Quantitative     Standing Status:   Future     Standing Expiration Date:   2/17/2023     Order Specific Question:   Release to patient     Answer:   Immediate   • Holter Monitor - 72 Hour Up To 15 Days     Standing Status:   Future     Standing Expiration Date:   2/17/2023     Order Specific Question:   Reason for exam?     Answer:   Palpitations     Order Specific Question:   Release to patient     Answer:   Immediate   • Adult Transthoracic Echo Complete w/ Color, Spectral and Contrast if necessary per protocol     Myocardial strain to be performed during echocardiogram as long as technically feasible     Standing Status:   Future     Standing Expiration Date:   2/17/2023     Order Specific Question:   Reason for exam?     Answer:   Dyspnea     Order Specific Question:   Release to patient     Answer:   Immediate        Likely has pain from left lateral cutaneous  nerve of thigh   No clinical evidence of DVT in legs     If D Dimer elevated would need further testing   He and wife will call for results today     Follow up with Augustina WILKINS , Ramone WILKINS  or myself            Return in about 4 weeks (around 3/17/2022).

## 2022-02-18 NOTE — THERAPY TREATMENT NOTE
"    Outpatient Physical Therapy Ortho Treatment Note  [facility]     Patient Name: Curtis Garsia  : 1995  MRN: [id number]  Today's Date: 2022      Visit Date: 2022    Attendance: 2 visits  Subjective improvement: n/a  MD appt: PRN;   Recheck due: 3/2/2022    Visit Dx:    ICD-10-CM ICD-9-CM   1. Hydrocephalus, unspecified type (HCC)  G91.9 331.4   2. Dizziness  R42 780.4   3. Poor balance  R26.89 781.99       Patient Active Problem List   Diagnosis   • Hydrocephalus (HCC)   • Palpitations   • S/P  shunt Dr Parks Dawson 2021   • JOSHI (dyspnea on exertion)        Past Medical History:   Diagnosis Date   • Anxiety    • Asthma     AS A CHILD    • Balance disorder    • Chiari I malformation (HCC)    • Cholelithiasis         Past Surgical History:   Procedure Laterality Date   • NERVE DECOMPRESSION     • SHUNT REVISION          PT Ortho     Row Name 22 1700       Precautions and Contraindications    Precautions Dizziness, R ventricle shunt placement, hx of chiari decompression  -KM       Posture/Observations    Posture/Observations Comments poor postural awareness  -KM          User Key  (r) = Recorded By, (t) = Taken By, (c) = Cosigned By    Initials Name Provider Type    Yoselin Domínguez PTA Physical Therapy Assistant                             PT Assessment/Plan     Row Name 22 1700          PT Assessment    Assessment Comments pt did well with treatment today. pt did have a couple of instances where he felt \"swimmy\" but resolved after sitting for a bit. Pts breathing was under control throughout and was wearing a heart monitor from the cardiologist. Did well with intro into TA.  -KM            PT Plan    PT Frequency 2x/week  -KM     PT Plan Comments Try airex FA w/ pertubations.  -KM           User Key  (r) = Recorded By, (t) = Taken By, (c) = Cosigned By    Initials Name Provider Type    Yoselin Domínguez PTA Physical Therapy " Assistant                   OP Exercises     Row Name 02/17/22 1700             Subjective Comments    Subjective Comments pt states that he has had a busy day at apts. Feels okay, mild pain in the neck  -KM              Subjective Pain    Able to rate subjective pain? yes  -KM      Pre-Treatment Pain Level 0  -KM      Post-Treatment Pain Level 1  mild pain in the neck  -KM              Exercise 1    Exercise Name 1 PROII for srength/endurance  -KM      Time 1 8 min  -KM      Additional Comments L2.0  -KM              Exercise 2    Exercise Name 2 Gaze stabilization with horizontal head turns  -KM      Reps 2 1  -KM      Time 2 30 sec hold  -KM              Exercise 3    Exercise Name 3 Gaze stabilization with vertical head turns  -KM      Reps 3 1  -KM      Time 3 30 sec hold  -KM              Exercise 4    Exercise Name 4 Tandem stance R/L lead  -KM      Reps 4 2  -KM      Time 4 30 sec hold  -KM              Exercise 5    Exercise Name 5 Airex FT + horz head turns  -KM      Reps 5 1  -KM      Time 5 30 sec hold  -KM              Exercise 6    Exercise Name 6 Airex FT + vertical head turns  -KM      Reps 6 1  -KM      Time 6 30 sec hold  -KM              Exercise 7    Exercise Name 7 Airex FT/EO  -KM      Reps 7 1  -KM      Time 7 30 sec hold  -KM              Exercise 8    Exercise Name 8 Airex FT/EC  -KM      Reps 8 1  -KM      Time 8 30 sec hold  -KM              Exercise 9    Exercise Name 9 Airex tandem gait  -KM      Sets 9 1  -KM      Reps 9 4 laps  -KM              Exercise 10    Exercise Name 10 Airex side stepping  -KM      Sets 10 1  -KM      Reps 10 4 laps  -KM              Exercise 11    Exercise Name 11 Airex tband mid rows  -KM      Sets 11 2  -KM      Reps 11 10  -KM      Additional Comments red  -KM              Exercise 12    Exercise Name 12 Airex tband shoulder ext  -KM      Reps 12 2  -KM      Time 12 10  -KM      Additional Comments red  -KM              Exercise 13    Exercise Name 13 Sit to  stands  -KM      Sets 13 2  -KM      Reps 13 10  -KM      Additional Comments no UE assist  -KM              Exercise 14    Exercise Name 14 Seated TA/PN education  -KM              Exercise 15    Exercise Name 15 Seated TA/PN + march  -KM      Sets 15 2  -KM      Reps 15 10  -KM              Exercise 16    Exercise Name 16 Seated TA/PN + LAQ  -KM      Sets 16 2  -KM      Reps 16 10  -KM              Exercise 17    Exercise Name 17 Airex alt march  -KM      Sets 17 2  -KM      Reps 17 10  -KM              Exercise 18    Exercise Name 18 Airex CR/TR  -KM      Sets 18 2  -KM      Reps 18 10  -KM            User Key  (r) = Recorded By, (t) = Taken By, (c) = Cosigned By    Initials Name Provider Type    Yoselin Domínguez, PTA Physical Therapy Assistant                              PT OP Goals     Row Name 02/17/22 1700          PT Short Term Goals    STG Date to Achieve 03/09/22  -KM     STG 1 Demonstrate independence/compliance in performance of initial HEP  -KM     STG 1 Progress Ongoing  -KM     STG 2 Improved Dynamic Gait Index to 17/24 to demonstrate improved safey and dynamic balance with functional mobility  -KM     STG 2 Progress Ongoing  -KM     STG 3 Demonstrate improved bilateral ankle DF/PF MMT to 5/5  -KM     STG 3 Progress Ongoing  -KM     STG 4 Perform tandem gait on level ground for 20 ft with no LOB, UE assist, and improve LE control  -KM     STG 4 Progress Ongoing  -KM     STG 5 Demonstrate improved bilateral hip MMT to 4+/5 or greater  -KM     STG 5 Progress Ongoing  -KM            Long Term Goals    LTG Date to Achieve 04/06/22  -KM     LTG 1 Subjectively report 50% overall improvement or greater  -KM     LTG 1 Progress Ongoing  -KM     LTG 2 Improved Dynamic Gait Index to 19/24 to demonstrate improved safey and dynamic balance with functional mobility and decrease fall risk  -KM     LTG 2 Progress Ongoing  -KM     LTG 3 Demonstrate improved bilateral hip MMT to 5/5 in all planes  -KM     LTG  3 Progress Ongoing  -KM     LTG 4 Perform balance/obstacle course consisting of hurdles, BOSU, unlevel/compliant surface, & wedges for incline with no LOB, good overall LE control to demonstrate improved dynamic balance with navigation outside of home  -KM     LTG 4 Progress Ongoing  -KM     LTG 5 Perform airex FA/EO balance with UE dual task activities (paloff press, rows, ball toss, etc) with improved LE control, no LOB  -KM     LTG 5 Progress Ongoing  -KM     LTG 6 Demonstrate ability to lift/carry lightly weighted crate from floor>waist, good lifting mechanics, without LOB  -KM     LTG 6 Progress Ongoing  -KM            Time Calculation    PT Goal Re-Cert Due Date 03/02/22  -KM           User Key  (r) = Recorded By, (t) = Taken By, (c) = Cosigned By    Initials Name Provider Type    Yoselin Domínguez PTA Physical Therapy Assistant                Therapy Education  Given: HEP, Symptoms/condition management, Pain management, Posture/body mechanics, Fall prevention and home safety  Program: New  How Provided: Verbal, Demonstration, Written  Provided to: Patient  Level of Understanding: Teach back education performed, Verbalized, Demonstrated              Time Calculation:   Start Time: 1645  Stop Time: 1745  Time Calculation (min): 60 min  Therapy Charges for Today     Code Description Service Date Service Provider Modifiers Qty    02915491788  PT THER PROC EA 15 MIN 2/17/2022 Yoselin Livingston PTA GP, CQ 3    [id number]  PT NEUROMUSC RE EDUCATION EA 15 MIN 2/17/2022 Yoselin Livingston PTA GP, CQ 1                    Yoselin Livingston PTA  2/17/2022

## 2022-02-19 ENCOUNTER — DOCUMENTATION (OUTPATIENT)
Dept: FAMILY MEDICINE CLINIC | Facility: CLINIC | Age: 27
End: 2022-02-19

## 2022-02-19 RX ORDER — AZITHROMYCIN 250 MG/1
TABLET, FILM COATED ORAL
Qty: 6 TABLET | Refills: 0 | Status: SHIPPED | OUTPATIENT
Start: 2022-02-19 | End: 2022-02-20 | Stop reason: SDUPTHER

## 2022-02-20 RX ORDER — AZITHROMYCIN 250 MG/1
TABLET, FILM COATED ORAL
Qty: 6 TABLET | Refills: 0 | Status: SHIPPED | OUTPATIENT
Start: 2022-02-20 | End: 2022-03-21

## 2022-02-23 ENCOUNTER — HOSPITAL ENCOUNTER (OUTPATIENT)
Dept: PHYSICAL THERAPY | Facility: HOSPITAL | Age: 27
Setting detail: THERAPIES SERIES
Discharge: HOME | End: 2022-02-23

## 2022-02-23 DIAGNOSIS — G91.9 HYDROCEPHALUS, UNSPECIFIED TYPE: Primary | ICD-10-CM

## 2022-02-23 DIAGNOSIS — R26.89 POOR BALANCE: ICD-10-CM

## 2022-02-23 DIAGNOSIS — R42 DIZZINESS: ICD-10-CM

## 2022-02-23 PROCEDURE — 97110 THERAPEUTIC EXERCISES: CPT | Performed by: PHYSICAL THERAPIST

## 2022-02-23 PROCEDURE — 97112 NEUROMUSCULAR REEDUCATION: CPT | Performed by: PHYSICAL THERAPIST

## 2022-02-23 NOTE — THERAPY TREATMENT NOTE
"    Outpatient Physical Therapy Ortho Treatment Note  [facility]     Patient Name: Curtis Garsia  : 1995  MRN: [id number]  Today's Date: 2022      Visit Date: 2022     Attendance: 3 visits  Subjective improvement: \"a little better\"  MD appt: PRN  Recheck due: 3/2/2022      Visit Dx:    ICD-10-CM ICD-9-CM   1. Hydrocephalus, unspecified type (HCC)  G91.9 331.4   2. Dizziness  R42 780.4   3. Poor balance  R26.89 781.99       Patient Active Problem List   Diagnosis   • Hydrocephalus (HCC)   • Palpitations   • S/P  shunt Dr Charly Li 2021   • JOSHI (dyspnea on exertion)        Past Medical History:   Diagnosis Date   • Anxiety    • Asthma     AS A CHILD    • Balance disorder    • Chiari I malformation (HCC)    • Cholelithiasis         Past Surgical History:   Procedure Laterality Date   • NERVE DECOMPRESSION     • SHUNT REVISION          PT Ortho     Row Name 22 1600       Subjective Comments    Subjective Comments Doing good this afternoon. States today has been a bad migraine/HA day with the change in pressure outside  -KG       Precautions and Contraindications    Precautions Dizziness, R ventricle shunt placement, hx of chiari decompression  -KG       Subjective Pain    Pre-Treatment Pain Level 1  neck  -KG    Post-Treatment Pain Level 1  -KG       Posture/Observations    Posture/Observations Comments Cues needed for posture throughout. Better with finding/maintaining PN with sitting. Reverts to slumped posture at rest  -KG          User Key  (r) = Recorded By, (t) = Taken By, (c) = Cosigned By    Initials Name Provider Type    Jaja Desai, PT Physical Therapist                             PT Assessment/Plan     Row Name 22 1600          PT Assessment    Assessment Comments Good overall tolerance to treatment. Pt did well with airex FA with manual perturbations at hips but had delay in correction/reaction with R lateral force; required " "conscious thought & effort. Challenged in regards to stability with R vs L leading split stance on stool with med ball pushes  -KG            PT Plan    PT Frequency 2x/week  -KG     PT Plan Comments Continue airex perturbations, SL balance. Try small obstacle course at rail or fitness.  -KG           User Key  (r) = Recorded By, (t) = Taken By, (c) = Cosigned By    Initials Name Provider Type    KG Jaja Browne, PT Physical Therapist                   OP Exercises     Row Name 02/23/22 1600             Subjective Comments    Subjective Comments Doing good this afternoon. States today has been a bad migraine/HA day with the change in pressure outside  -KG              Subjective Pain    Able to rate subjective pain? yes  -KG      Pre-Treatment Pain Level 1  neck  -KG      Post-Treatment Pain Level 1  -KG              Exercise 1    Exercise Name 1 PROII for strength/endurance  -KG      Time 1 8 min  -KG              Exercise 2    Exercise Name 2 Scalene stretch L  -KG      Cueing 2 Verbal  -KG      Sets 2 --  -KG      Reps 2 2  -KG      Time 2 30\" hold  -KG      Additional Comments stabilizing at clavicle with hand  -KG              Exercise 3    Exercise Name 3 Doorway pec stretch  -KG      Cueing 3 --  -KG      Sets 3 --  -KG      Reps 3 2  -KG      Time 3 30\" hold  -KG              Exercise 4    Exercise Name 4 Airex CR/TR  -KG      Cueing 4 Verbal  -KG      Sets 4 2  -KG      Reps 4 10  -KG              Exercise 5    Exercise Name 5 Airex marching with TA  -KG      Cueing 5 Verbal  -KG      Sets 5 2  -KG      Reps 5 10  -KG              Exercise 6    Exercise Name 6 Level ground tandem gait  -KG      Cueing 6 Verbal  -KG      Sets 6 1  -KG      Reps 6 2 laps  -KG      Additional Comments 1 hand touch at rail  -KG              Exercise 7    Exercise Name 7 Airex beam tandem gait  -KG      Cueing 7 Verbal  -KG      Sets 7 1  -KG      Reps 7 4 laps  -KG      Additional Comments HHA at railing  -KG              " "Exercise 8    Exercise Name 8 Airex beam sidestepping over hurdles  -KG      Cueing 8 Verbal  -KG      Sets 8 1  -KG      Reps 8 4 laps  -KG      Additional Comments 3 sm hurdles; B HHA  -KG              Exercise 9    Exercise Name 9 Airex tband mid rows  -KG      Cueing 9 Verbal  -KG      Sets 9 1  -KG      Reps 9 15  -KG      Additional Comments green tband  -KG              Exercise 10    Exercise Name 10 Airex tband chago shoulder ext/scap retraction  -KG      Cueing 10 Verbal  -KG      Sets 10 1  -KG      Reps 10 15  -KG      Additional Comments green tband  -KG              Exercise 11    Exercise Name 11 Airex tband paloff press  -KG      Cueing 11 Verbal  -KG      Sets 11 1  -KG      Reps 11 15 ea direction  -KG      Additional Comments green tband  -KG              Exercise 12    Exercise Name 12 Level ground R/L SLS  -KG      Cueing 12 Verbal  -KG      Reps 12 2 ea  -KG      Time 12 30\" hold  -KG      Additional Comments Frequent hand touches  -KG              Exercise 13    Exercise Name 13 Split stance on stool with med ball pushes 3 way  -KG      Cueing 13 Verbal  -KG      Sets 13 1  -KG      Reps 13 5 ea LE lead  -KG              Exercise 14    Exercise Name 14 Airex FT with multidirection perturbations at hips  -KG      Cueing 14 Verbal; Tactile  -KG      Reps 14 1  -KG      Time 14 ~30\"  -KG              Exercise 15    Exercise Name 15 Seated PN/TA alt LAQ  -KG      Cueing 15 Verbal  -KG      Sets 15 1  -KG      Reps 15 15  -KG              Exercise 16    Exercise Name 16 Seated PN/TA alt marching  -KG      Cueing 16 Verbal  -KG      Sets 16 1  -KG      Reps 16 15  -KG            User Key  (r) = Recorded By, (t) = Taken By, (c) = Cosigned By    Initials Name Provider Type    KG Jaja Browne, PT Physical Therapist                              PT OP Goals     Row Name 02/23/22 1600          PT Short Term Goals    STG Date to Achieve 03/09/22  -KG     STG 1 Demonstrate independence/compliance in " performance of initial HEP  -KG     STG 1 Progress Ongoing  -KG     STG 2 Improved Dynamic Gait Index to 17/24 to demonstrate improved safey and dynamic balance with functional mobility  -KG     STG 2 Progress Ongoing  -KG     STG 3 Demonstrate improved bilateral ankle DF/PF MMT to 5/5  -KG     STG 3 Progress Ongoing  -KG     STG 4 Perform tandem gait on level ground for 20 ft with no LOB, UE assist, and improve LE control  -KG     STG 4 Progress Met  -KG     STG 5 Demonstrate improved bilateral hip MMT to 4+/5 or greater  -KG     STG 5 Progress Ongoing  -KG            Long Term Goals    LTG Date to Achieve 04/06/22  -KG     LTG 1 Subjectively report 50% overall improvement or greater  -KG     LTG 1 Progress Ongoing  -KG     LTG 2 Improved Dynamic Gait Index to 19/24 to demonstrate improved safey and dynamic balance with functional mobility and decrease fall risk  -KG     LTG 2 Progress Ongoing  -KG     LTG 3 Demonstrate improved bilateral hip MMT to 5/5 in all planes  -KG     LTG 3 Progress Ongoing  -KG     LTG 4 Perform balance/obstacle course consisting of hurdles, BOSU, unlevel/compliant surface, & wedges for incline with no LOB, good overall LE control to demonstrate improved dynamic balance with navigation outside of home  -KG     LTG 4 Progress Ongoing  -KG     LTG 5 Perform airex FA/EO balance with UE dual task activities (paloff press, rows, ball toss, etc) with improved LE control, no LOB  -KG     LTG 5 Progress Ongoing  -KG     LTG 6 Demonstrate ability to lift/carry lightly weighted crate from floor>waist, good lifting mechanics, without LOB  -KG     LTG 6 Progress Ongoing  -KG            Time Calculation    PT Goal Re-Cert Due Date 03/02/22  -KG           User Key  (r) = Recorded By, (t) = Taken By, (c) = Cosigned By    Initials Name Provider Type    KG Jaja Browne, PT Physical Therapist                Therapy Education  Education Details: Added scalene stretch, doorway stretch  Given: HEP,  Symptoms/condition management, Pain management, Posture/body mechanics, Fall prevention and home safety  Program: Reinforced, Progressed  How Provided: Verbal, Demonstration, Written  Provided to: Patient  Level of Understanding: Teach back education performed, Verbalized, Demonstrated              Time Calculation:   Start Time: 1603  Stop Time: 1657  Time Calculation (min): 54 min  Therapy Charges for Today     Code Description Service Date Service Provider Modifiers Qty    73697057582 HC PT THER PROC EA 15 MIN 2/23/2022 Jaja Browne, PT GP 2    [id number]  PT NEUROMUSC RE EDUCATION EA 15 MIN 2/23/2022 Jaja Browne, PT GP 2                    Jaja Browne, PT  2/23/2022

## 2022-02-25 ENCOUNTER — HOSPITAL ENCOUNTER (OUTPATIENT)
Dept: PHYSICAL THERAPY | Facility: HOSPITAL | Age: 27
Setting detail: THERAPIES SERIES
Discharge: HOME | End: 2022-02-25

## 2022-02-25 DIAGNOSIS — R42 DIZZINESS: ICD-10-CM

## 2022-02-25 DIAGNOSIS — G91.9 HYDROCEPHALUS, UNSPECIFIED TYPE: Primary | ICD-10-CM

## 2022-02-25 DIAGNOSIS — R26.89 POOR BALANCE: ICD-10-CM

## 2022-02-25 PROCEDURE — 97110 THERAPEUTIC EXERCISES: CPT

## 2022-02-25 PROCEDURE — 97112 NEUROMUSCULAR REEDUCATION: CPT

## 2022-02-25 NOTE — THERAPY TREATMENT NOTE
"    Outpatient Physical Therapy Ortho Treatment Note  [facility]     Patient Name: Curtis Garsia  : 1995  MRN: [id number]  Today's Date: 2022      Visit Date: 2022    Attendance: 4 visits  Subjective improvement: \"a little better\"  MD appt: PRN  Recheck due: 3/2/2022    Visit Dx:    ICD-10-CM ICD-9-CM   1. Hydrocephalus, unspecified type (HCC)  G91.9 331.4   2. Dizziness  R42 780.4   3. Poor balance  R26.89 781.99       Patient Active Problem List   Diagnosis   • Hydrocephalus (HCC)   • Palpitations   • S/P  shunt Dr Charly Li 2021   • JOSHI (dyspnea on exertion)        Past Medical History:   Diagnosis Date   • Anxiety    • Asthma     AS A CHILD    • Balance disorder    • Chiari I malformation (HCC)    • Cholelithiasis         Past Surgical History:   Procedure Laterality Date   • NERVE DECOMPRESSION     • SHUNT REVISION          PT Ortho     Row Name 22 0800       Precautions and Contraindications    Precautions Dizziness, R ventricle shunt placement, hx of chiari decompression  -KM       Subjective Pain    Able to rate subjective pain? yes  -KM    Pre-Treatment Pain Level 4  -KM          User Key  (r) = Recorded By, (t) = Taken By, (c) = Cosigned By    Initials Name Provider Type    Yoselin Domínguez PTA Physical Therapy Assistant                             PT Assessment/Plan     Row Name 22 0900          PT Assessment    Assessment Comments Decrease amount of time today therefore not able to do as many exercises. pt did really well and is becoming more self aware of keeping TA contraction with therex. Progressed pt to dynadisc for seated core activities which pt did well with but could tell it was quite challenging for pt. Did really well with pertubations today. Added SLS to HEP.  -KM            PT Plan    PT Frequency 2x/week  -KM     PT Plan Comments Resume airex beam activities. Try balance course next  -KM           User Key  (r) = " Recorded By, (t) = Taken By, (c) = Cosigned By    Initials Name Provider Type    Yoselin Domínguez PTA Physical Therapy Assistant                   OP Exercises     Row Name 02/25/22 0800             Subjective Comments    Subjective Comments pt states that he saw the neurologist and he increased his medicine some. States that he slept better last night and will hopefully help with pain/dizziness  -KM              Subjective Pain    Able to rate subjective pain? yes  -KM      Pre-Treatment Pain Level 4  -KM              Exercise 1    Exercise Name 1 PROII for strength/endurance  -KM      Time 1 8 min  -KM      Additional Comments L2.0  -KM              Exercise 2    Exercise Name 2 Scalene stretch L  -KM      Reps 2 2  -KM      Time 2 30 sec hold  -KM      Additional Comments stabilizing at clavicle  -KM              Exercise 3    Exercise Name 3 Doorway pec stretch  -KM      Reps 3 2  -KM      Time 3 30 sec hold  -KM              Exercise 4    Exercise Name 4 Airex CR/TR  -KM      Sets 4 2  -KM      Reps 4 10  -KM              Exercise 5    Exercise Name 5 Airex marching with TA  -KM      Sets 5 2  -KM      Reps 5 10  -KM              Exercise 6    Exercise Name 6 Airex tband rows  -KM      Sets 6 2  -KM      Reps 6 10  -KM      Additional Comments green  -KM              Exercise 7    Exercise Name 7 Airex tband shoulder ext  -KM      Sets 7 2  -KM      Reps 7 10  -KM      Additional Comments green  -KM              Exercise 8    Exercise Name 8 Airex paloff press  -KM      Sets 8 2  -KM      Reps 8 10 ea direction  -KM      Additional Comments green  -KM              Exercise 9    Exercise Name 9 Dynadisc TA/PN + LAQ  -KM      Sets 9 2  -KM      Reps 9 10  -KM              Exercise 10    Exercise Name 10 Dynadisc TA/PN + mid rows  -KM      Sets 10 2  -KM      Reps 10 10  -KM              Exercise 11    Exercise Name 11 Airex FTw/ pertubations from PTA  -KM      Reps 11 2  -KM      Time 11 30 sec  -KM               Exercise 12    Exercise Name 12 SLS bilateral  -KM      Reps 12 2  -KM      Time 12 30 sec hold ea  -KM      Additional Comments intermittent UE touch  -KM            User Key  (r) = Recorded By, (t) = Taken By, (c) = Cosigned By    Initials Name Provider Type    Yoselin Domínguez PTA Physical Therapy Assistant                              PT OP Goals     Row Name 02/25/22 0900          PT Short Term Goals    STG Date to Achieve 03/09/22  -KM     STG 1 Demonstrate independence/compliance in performance of initial HEP  -KM     STG 1 Progress Ongoing  -KM     STG 2 Improved Dynamic Gait Index to 17/24 to demonstrate improved safey and dynamic balance with functional mobility  -KM     STG 2 Progress Ongoing  -KM     STG 3 Demonstrate improved bilateral ankle DF/PF MMT to 5/5  -KM     STG 3 Progress Ongoing  -KM     STG 4 Perform tandem gait on level ground for 20 ft with no LOB, UE assist, and improve LE control  -KM     STG 4 Progress Met  -KM     STG 5 Demonstrate improved bilateral hip MMT to 4+/5 or greater  -KM     STG 5 Progress Ongoing  -KM            Long Term Goals    LTG Date to Achieve 04/06/22  -KM     LTG 1 Subjectively report 50% overall improvement or greater  -KM     LTG 1 Progress Ongoing  -KM     LTG 2 Improved Dynamic Gait Index to 19/24 to demonstrate improved safey and dynamic balance with functional mobility and decrease fall risk  -KM     LTG 2 Progress Ongoing  -KM     LTG 3 Demonstrate improved bilateral hip MMT to 5/5 in all planes  -KM     LTG 3 Progress Ongoing  -KM     LTG 4 Perform balance/obstacle course consisting of hurdles, BOSU, unlevel/compliant surface, & wedges for incline with no LOB, good overall LE control to demonstrate improved dynamic balance with navigation outside of home  -KM     LTG 4 Progress Ongoing  -KM     LTG 5 Perform airex FA/EO balance with UE dual task activities (paloff press, rows, ball toss, etc) with improved LE control, no LOB  -KM      LTG 5 Progress Ongoing  -KM     LTG 6 Demonstrate ability to lift/carry lightly weighted crate from floor>waist, good lifting mechanics, without LOB  -KM     LTG 6 Progress Ongoing  -KM            Time Calculation    PT Goal Re-Cert Due Date 03/02/22  -KM           User Key  (r) = Recorded By, (t) = Taken By, (c) = Cosigned By    Initials Name Provider Type    Yoselin Domínguez PTA Physical Therapy Assistant                Therapy Education  Education Details: SLS  Given: HEP, Symptoms/condition management, Pain management, Posture/body mechanics, Fall prevention and home safety  Program: Reinforced, Progressed  How Provided: Verbal, Demonstration, Written  Provided to: Patient  Level of Understanding: Teach back education performed, Verbalized, Demonstrated              Time Calculation:   Start Time: 0846  Stop Time: 0930  Time Calculation (min): 44 min  Therapy Charges for Today     Code Description Service Date Service Provider Modifiers Qty    28609845224  PT THER PROC EA 15 MIN 2/25/2022 Yoselin Livingston PTA GP, CQ 1    [id number]  PT NEUROMUSC RE EDUCATION EA 15 MIN 2/25/2022 Yoselin Livingston PTA GP, CQ 2                    Yoselin Livingston PTA  2/25/2022

## 2022-03-01 ENCOUNTER — HOSPITAL ENCOUNTER (OUTPATIENT)
Dept: PHYSICAL THERAPY | Facility: HOSPITAL | Age: 27
Setting detail: THERAPIES SERIES
Discharge: HOME | End: 2022-03-01

## 2022-03-01 DIAGNOSIS — R42 DIZZINESS: ICD-10-CM

## 2022-03-01 DIAGNOSIS — R26.89 POOR BALANCE: ICD-10-CM

## 2022-03-01 DIAGNOSIS — G91.9 HYDROCEPHALUS, UNSPECIFIED TYPE: Primary | ICD-10-CM

## 2022-03-01 PROCEDURE — 97110 THERAPEUTIC EXERCISES: CPT

## 2022-03-01 PROCEDURE — 97112 NEUROMUSCULAR REEDUCATION: CPT
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
## 2022-03-03 ENCOUNTER — HOSPITAL ENCOUNTER (OUTPATIENT)
Dept: PHYSICAL THERAPY | Facility: HOSPITAL | Age: 27
Setting detail: THERAPIES SERIES
Discharge: HOME | End: 2022-03-03

## 2022-03-03 DIAGNOSIS — R26.89 POOR BALANCE: ICD-10-CM

## 2022-03-03 DIAGNOSIS — G91.9 HYDROCEPHALUS, UNSPECIFIED TYPE: Primary | ICD-10-CM

## 2022-03-03 DIAGNOSIS — R42 DIZZINESS: ICD-10-CM

## 2022-03-03 PROCEDURE — 97530 THERAPEUTIC ACTIVITIES: CPT | Performed by: PHYSICAL THERAPIST

## 2022-03-03 PROCEDURE — 97110 THERAPEUTIC EXERCISES: CPT | Performed by: PHYSICAL THERAPIST

## 2022-03-04 ENCOUNTER — HOSPITAL ENCOUNTER (OUTPATIENT)
Dept: CARDIOLOGY | Facility: HOSPITAL | Age: 27
Discharge: HOME | End: 2022-03-04
Admitting: INTERNAL MEDICINE

## 2022-03-04 VITALS
DIASTOLIC BLOOD PRESSURE: 90 MMHG | HEIGHT: 68 IN | WEIGHT: 212 LBS | SYSTOLIC BLOOD PRESSURE: 130 MMHG | BODY MASS INDEX: 32.13 KG/M2

## 2022-03-04 DIAGNOSIS — R06.09 DOE (DYSPNEA ON EXERTION): ICD-10-CM

## 2022-03-04 PROCEDURE — 93356 MYOCRD STRAIN IMG SPCKL TRCK: CPT

## 2022-03-04 PROCEDURE — 25010000002 PERFLUTREN 6.52 MG/ML SUSPENSION: Performed by: INTERNAL MEDICINE

## 2022-03-04 PROCEDURE — 93306 TTE W/DOPPLER COMPLETE: CPT

## 2022-03-04 PROCEDURE — 93306 TTE W/DOPPLER COMPLETE: CPT | Performed by: INTERNAL MEDICINE

## 2022-03-04 PROCEDURE — 93356 MYOCRD STRAIN IMG SPCKL TRCK: CPT | Performed by: INTERNAL MEDICINE

## 2022-03-04 RX ADMIN — PERFLUTREN 8.48 MG: 6.52 INJECTION, SUSPENSION INTRAVENOUS at 11:33

## 2022-03-04 NOTE — THERAPY PROGRESS REPORT/RE-CERT
"    Outpatient Physical Therapy Ortho Progress Note  [facility]     Patient Name: Curtis Garsia  : 1995  MRN: [id number]  Today's Date: 3/3/2022      Visit Date: 2022     Attendance: 6 visits  Subjective improvement: 20%  MD appt: PRN  Recheck due: 3/24/2022    Visit Dx:    ICD-10-CM ICD-9-CM   1. Hydrocephalus, unspecified type (HCC)  G91.9 331.4   2. Dizziness  R42 780.4   3. Poor balance  R26.89 781.99       Patient Active Problem List   Diagnosis   • Hydrocephalus (HCC)   • Palpitations   • S/P  shunt Dr Parks [place] 2021   • JOSHI (dyspnea on exertion)        Past Medical History:   Diagnosis Date   • Anxiety    • Asthma     AS A CHILD    • Balance disorder    • Chiari I malformation (HCC)    • Cholelithiasis         Past Surgical History:   Procedure Laterality Date   • NERVE DECOMPRESSION     • SHUNT REVISION          PT Ortho     Row Name 22 1600       Subjective Comments    Subjective Comments Pt states he can tell that therapy is helping. Is noticing good, small changes. Getting stronger and his balance is improving too. Reports 20% overall improvement.  -KG       Precautions and Contraindications    Precautions Dizziness, R ventricle shunt placement, hx of chiari decompression  -KG       Subjective Pain    Able to rate subjective pain? yes  -KG    Pre-Treatment Pain Level 0  \"my legs are sore\"  -KG    Post-Treatment Pain Level 0  -KG       Posture/Observations    Posture/Observations Comments Rounded shoulders posture. Improving overall postural awareness but still needs intermittent cues throughout treatment.  -KG       General ROM    GENERAL ROM COMMENTS Bilateral UE/LE AROM WFL. Some mild limtations in cervical mobiltiy noted. Cautious with cervical flex/ext due to dizziness symptoms.  -KG       MMT Right Upper Ext    Rt Shoulder Flexion MMT, Gross Movement (5/5) normal  -KG    Rt Shoulder ABduction MMT, Gross Movement (4+/5) good plus  -KG    Rt " "Elbow Flexion MMT, Gross Movement: (5/5) normal  -KG    Rt Elbow Extension MMT, Gross Movement: (5/5) normal  -KG       MMT Left Upper Ext    Lt Shoulder Flexion MMT, Gross Movement (5/5) normal  -KG    Lt Shoulder ABduction MMT, Gross Movement (4+/5) good plus  -KG    Lt Elbow Flexion MMT, Gross Movement (5/5) normal  -KG    Lt Elbow Extension MMT, Gross Movement (5/5) normal  -KG       MMT Right Lower Ext    Rt Hip Flexion MMT, Gross Movement (4+/5) good plus  -KG    Rt Hip ABduction MMT, Gross Movement (4+/5) good plus  -KG    Rt Hip ADduction MMT, Gross Movement (4+/5) good plus  -KG    Rt Knee Extension MMT, Gross Movement (5/5) normal  -KG    Rt Knee Flexion MMT, Gross Movement (5/5) normal  -KG    Rt Ankle Plantarflexion MMT, Gross Movement (5/5) normal  -KG    Rt Ankle Dorsiflexion MMT, Gross Movement (5/5) normal  -KG       MMT Left Lower Ext    Lt Hip Flexion MMT, Gross Movement (4+/5) good plus  -KG    Lt Hip ABduction MMT, Gross Movement (4+/5) good plus  -KG    Lt Hip ADduction MMT, Gross Movement (4+/5) good plus  -KG    Lt Knee Extension MMT, Gross Movement (5/5) normal  -KG    Lt Knee Flexion MMT, Gross Movement (5/5) normal  -KG    Lt Ankle Plantarflexion MMT, Gross Movement (5/5) normal  -KG    Lt Ankle Dorsiflexion MMT, Gross Movement (5/5) normal  -KG       Sensation    Sensation WNL? WFL  -KG    Light Touch No apparent deficits  -KG       Upper Extremity Flexibility    Scalenes Left:; Mildly limited  -KG    Upper Trapezius Bilateral:; Mildly limited  -KG    Pect Minor Bilateral:; Mildly limited  -KG    Pect Major Bilateral:; Mildly limited  -KG       Lower Extremity Flexibility    Hamstrings Bilateral:; Moderately limited  -KG       Balance Skills Training    SLS 30\" with intermittent touches at rail with L/R lead  -KG    Balance Comments See functional outcome measures for DGI testing. Pt able to perform tandem gait on level ground with improved LE control, no LOB. Balance course: challenged " walking over unlevel mat, mild unsteadiness noted. Decreased dizziness and improved stability/balance with gait with vertical/horizontal head turns.  -KG       Gait/Stairs (Locomotion)    Comment (Gait/Stairs) Non-antalgic gait with no AD. Improving dedra. Does keep L elbow flexed at side with gait, matthew with balance course/walking on unlevel ground  -KG          User Key  (r) = Recorded By, (t) = Taken By, (c) = Cosigned By    Initials Name Provider Type    KG Jaja Browne, PT Physical Therapist                             PT Assessment/Plan     Row Name 03/03/22 1600          PT Assessment    Functional Limitations Decreased safety during functional activities; Impaired gait; Limitation in home management; Limitations in community activities; Performance in leisure activities; Performance in self-care ADL; Limitations in functional capacity and performance  -KG     Impairments Balance; Gait; Impaired muscle endurance; Impaired flexibility; Muscle strength; Pain; Poor body mechanics; Posture; Range of motion  -KG     Assessment Comments Pt has progressed quite well overall with PT as evidenced by improvements in dynamic balance & functional strength. Pt note experiencing as much dizziness throughout treatment & demonstrate good improvement in dynamic gait score. Pt continues with deficits in core stability is very challenged with dynadisc seated core; works hard and does well. Pt more unsteady on unlevel services and with dual task activities. Did well wtih BOSU step ups but does challenge LE stability. Overall pt making good gains but still needs work on dynamic balance/proprioception, core/hip stability, gait perforamnce, & functional activity tolerance.  -KG     Rehab Potential Good  -KG     Patient/caregiver participated in establishment of treatment plan and goals Yes  -KG     Patient would benefit from skilled therapy intervention Yes  -KG            PT Plan    PT Frequency 2x/week  -KG     Predicted  "Duration of Therapy Intervention (PT) 8 visits  -KG     PT Plan Comments Continue to challenge dynamic balance/obstacle course. Could try outside gait if weather nice/permits. Continue resisted gait; add lateral next visit. Conitnue core stabilization activities and LE strengthening.  -KG           User Key  (r) = Recorded By, (t) = Taken By, (c) = Cosigned By    Initials Name Provider Type    KG Jaja Browne, PT Physical Therapist                   OP Exercises     Row Name 03/03/22 1600             Subjective Comments    Subjective Comments Pt states he can tell that therapy is helping. Is noticing good, small changes. Getting stronger and his balance is improving too. Reports 20% overall improvement.  -KG              Subjective Pain    Able to rate subjective pain? yes  -KG      Pre-Treatment Pain Level 0  \"my legs are sore\"  -KG      Post-Treatment Pain Level 0  -KG              Exercise 1    Exercise Name 1 PROII for strength/endurance  -KG      Time 1 8 min  -KG      Additional Comments L 3.5  -KG              Exercise 2    Exercise Name 2 Scalene stretch L  -KG      Reps 2 1  -KG      Time 2 30 sechold  -KG              Exercise 3    Exercise Name 3 Doorway pec stretch  -KG      Reps 3 1  -KG      Time 3 30 sec hold  -KG              Exercise 4    Exercise Name 4 Dynamic gait index test  -KG      Time 4 7 min  -KG              Exercise 5    Exercise Name 5 BLE/BUE strength measurements  -KG              Exercise 6    Exercise Name 6 BOSU fwd step ups  -KG      Cueing 6 Verbal  -KG      Sets 6 1  -KG      Reps 6 10 ea LE  -KG      Additional Comments Single rail assist  -KG              Exercise 7    Exercise Name 7 BOSU lateral step ups  -KG      Cueing 7 Verbal  -KG      Sets 7 1  -KG      Reps 7 10 ea LE  -KG      Additional Comments B HHA at rail  -KG              Exercise 8    Exercise Name 8 Balance/obstacle course: 6\" bench step, 3 sm hurdles, unlevel mat, 1 tall, 1 sm nitesh, 360 around cone  -KG "      Cueing 8 Tactile  -KG      Sets 8 1  -KG      Reps 8 3 laps  -KG              Exercise 9    Exercise Name 9 CC resisted gait: fwd; bwd  -KG      Cueing 9 Verbal  -KG      Sets 9 1  -KG      Reps 9 5 laps ea  -KG      Additional Comments 1.5 plates  -KG              Exercise 10    Exercise Name 10 Dynadisc seated pN/TA alt LAQ  -KG      Cueing 10 Verbal  -KG      Sets 10 1  -KG      Reps 10 15  -KG              Exercise 11    Exercise Name 11 Dynadisc seated pN/TA alt marching  -KG      Cueing 11 Verbal  -KG      Sets 11 1  -KG      Reps 11 15  -KG            User Key  (r) = Recorded By, (t) = Taken By, (c) = Cosigned By    Initials Name Provider Type    KG Jaja Browne, PT Physical Therapist                              PT OP Goals     Row Name 03/03/22 1600          PT Short Term Goals    STG Date to Achieve 03/09/22  -KG     STG 1 Demonstrate independence/compliance in performance of initial HEP  -KG     STG 1 Progress Met  -KG     STG 2 Improved Dynamic Gait Index to 17/24 to demonstrate improved safey and dynamic balance with functional mobility  -KG     STG 2 Progress Met  -KG     STG 3 Demonstrate improved bilateral ankle DF/PF MMT to 5/5  -KG     STG 3 Progress Met  -KG     STG 4 Perform tandem gait on level ground for 20 ft with no LOB, UE assist, and improve LE control  -KG     STG 4 Progress Met  -KG     STG 5 Demonstrate improved bilateral hip MMT to 4+/5 or greater  -KG     STG 5 Progress Met  -KG            Long Term Goals    LTG Date to Achieve 04/06/22  -KG     LTG 1 Subjectively report 50% overall improvement or greater  -KG     LTG 1 Progress Ongoing  -KG     LTG 2 Improved Dynamic Gait Index to 19/24 to demonstrate improved safey and dynamic balance with functional mobility and decrease fall risk  -KG     LTG 2 Progress Met  -KG     LTG 3 Demonstrate improved bilateral hip MMT to 5/5 in all planes  -KG     LTG 3 Progress Ongoing  -KG     LTG 4 Perform balance/obstacle course consisting of  hurdles, BOSU, unlevel/compliant surface, & wedges for incline with no LOB, good overall LE control to demonstrate improved dynamic balance with navigation outside of home  -KG     LTG 4 Progress Ongoing  -KG     LTG 5 Perform airex FA/EO balance with UE dual task activities (paloff press, rows, ball toss, etc) with improved LE control, no LOB  -KG     LTG 5 Progress Partially Met;Progressing  -KG     LTG 6 Demonstrate ability to lift/carry lightly weighted crate from floor>waist, good lifting mechanics, without LOB  -KG     LTG 6 Progress Ongoing  -KG            Time Calculation    PT Goal Re-Cert Due Date 03/24/22  -KG           User Key  (r) = Recorded By, (t) = Taken By, (c) = Cosigned By    Initials Name Provider Type    KG Jaja Browne, PT Physical Therapist                Therapy Education  Given: HEP, Symptoms/condition management, Pain management, Posture/body mechanics, Fall prevention and home safety  Program: Reinforced, Progressed  How Provided: Verbal, Demonstration, Written  Provided to: Patient  Level of Understanding: Teach back education performed, Verbalized, Demonstrated              Time Calculation:   Start Time: 1605  Stop Time: 1651  Time Calculation (min): 46 min  Therapy Charges for Today     Code Description Service Date Service Provider Modifiers Qty    35897107182 HC PT THER PROC EA 15 MIN 3/3/2022 Jaja Browne, PT GP 2    [id number]  PT THERAPEUTIC ACT EA 15 MIN 3/3/2022 Jaja Browne, PT GP 1                    Jaja Browne PT  3/3/2022

## 2022-03-05 LAB
BH CV ECHO LEFT VENTRICLE GLOBAL LONGITUDINAL STRAIN: -15 %
BH CV ECHO MEAS - AO MAX PG (FULL): 2.9 MMHG
BH CV ECHO MEAS - AO MAX PG: 6.1 MMHG
BH CV ECHO MEAS - AO MEAN PG (FULL): 2 MMHG
BH CV ECHO MEAS - AO MEAN PG: 4 MMHG
BH CV ECHO MEAS - AO ROOT AREA (BSA CORRECTED): 1.7
BH CV ECHO MEAS - AO ROOT AREA: 10.2 CM^2
BH CV ECHO MEAS - AO ROOT DIAM: 3.6 CM
BH CV ECHO MEAS - AO V2 MAX: 123 CM/SEC
BH CV ECHO MEAS - AO V2 MEAN: 87 CM/SEC
BH CV ECHO MEAS - AO V2 VTI: 24.1 CM
BH CV ECHO MEAS - AVA(I,A): 2.2 CM^2
BH CV ECHO MEAS - AVA(I,D): 2.2 CM^2
BH CV ECHO MEAS - AVA(V,A): 2.3 CM^2
BH CV ECHO MEAS - AVA(V,D): 2.3 CM^2
BH CV ECHO MEAS - BSA(HAYCOCK): 2.2 M^2
BH CV ECHO MEAS - BSA: 2.1 M^2
BH CV ECHO MEAS - BZI_BMI: 32.2 KILOGRAMS/M^2
BH CV ECHO MEAS - BZI_METRIC_HEIGHT: 172.7 CM
BH CV ECHO MEAS - BZI_METRIC_WEIGHT: 96.2 KG
BH CV ECHO MEAS - EDV(CUBED): 107.9 ML
BH CV ECHO MEAS - EDV(MOD-SP4): 128 ML
BH CV ECHO MEAS - EDV(TEICH): 105.4 ML
BH CV ECHO MEAS - EF(CUBED): 72.9 %
BH CV ECHO MEAS - EF(MOD-SP4): 63.9 %
BH CV ECHO MEAS - EF(TEICH): 64.6 %
BH CV ECHO MEAS - ESV(CUBED): 29.2 ML
BH CV ECHO MEAS - ESV(MOD-SP4): 46.2 ML
BH CV ECHO MEAS - ESV(TEICH): 37.3 ML
BH CV ECHO MEAS - FS: 35.3 %
BH CV ECHO MEAS - IVS/LVPW: 0.83
BH CV ECHO MEAS - IVSD: 0.89 CM
BH CV ECHO MEAS - LA DIMENSION: 3.9 CM
BH CV ECHO MEAS - LA/AO: 1.1
BH CV ECHO MEAS - LAT PEAK E' VEL: 15.4 CM/SEC
BH CV ECHO MEAS - LV DIASTOLIC VOL/BSA (35-75): 61.1 ML/M^2
BH CV ECHO MEAS - LV MASS(C)D: 163.7 GRAMS
BH CV ECHO MEAS - LV MASS(C)DI: 78.1 GRAMS/M^2
BH CV ECHO MEAS - LV MAX PG: 3.1 MMHG
BH CV ECHO MEAS - LV MEAN PG: 2 MMHG
BH CV ECHO MEAS - LV SYSTOLIC VOL/BSA (12-30): 22.1 ML/M^2
BH CV ECHO MEAS - LV V1 MAX: 88.7 CM/SEC
BH CV ECHO MEAS - LV V1 MEAN: 55.1 CM/SEC
BH CV ECHO MEAS - LV V1 VTI: 17 CM
BH CV ECHO MEAS - LVIDD: 4.8 CM
BH CV ECHO MEAS - LVIDS: 3.1 CM
BH CV ECHO MEAS - LVLD AP4: 9.3 CM
BH CV ECHO MEAS - LVLS AP4: 7.4 CM
BH CV ECHO MEAS - LVOT AREA (M): 3.1 CM^2
BH CV ECHO MEAS - LVOT AREA: 3.1 CM^2
BH CV ECHO MEAS - LVOT DIAM: 2 CM
BH CV ECHO MEAS - LVPWD: 1.1 CM
BH CV ECHO MEAS - MED PEAK E' VEL: 10.4 CM/SEC
BH CV ECHO MEAS - MV A MAX VEL: 64.5 CM/SEC
BH CV ECHO MEAS - MV DEC SLOPE: 525.5 CM/SEC^2
BH CV ECHO MEAS - MV DEC TIME: 0.18 SEC
BH CV ECHO MEAS - MV E MAX VEL: 102 CM/SEC
BH CV ECHO MEAS - MV E/A: 1.6
BH CV ECHO MEAS - MV P1/2T MAX VEL: 119 CM/SEC
BH CV ECHO MEAS - MV P1/2T: 66.3 MSEC
BH CV ECHO MEAS - MVA P1/2T LCG: 1.8 CM^2
BH CV ECHO MEAS - MVA(P1/2T): 3.3 CM^2
BH CV ECHO MEAS - PA MAX PG: 5.1 MMHG
BH CV ECHO MEAS - PA V2 MAX: 113 CM/SEC
BH CV ECHO MEAS - RAP SYSTOLE: 5 MMHG
BH CV ECHO MEAS - RVSP: 26.3 MMHG
BH CV ECHO MEAS - SI(AO): 117.1 ML/M^2
BH CV ECHO MEAS - SI(CUBED): 37.5 ML/M^2
BH CV ECHO MEAS - SI(LVOT): 25.5 ML/M^2
BH CV ECHO MEAS - SI(MOD-SP4): 39 ML/M^2
BH CV ECHO MEAS - SI(TEICH): 32.5 ML/M^2
BH CV ECHO MEAS - SV(AO): 245.3 ML
BH CV ECHO MEAS - SV(CUBED): 78.6 ML
BH CV ECHO MEAS - SV(LVOT): 53.4 ML
BH CV ECHO MEAS - SV(MOD-SP4): 81.8 ML
BH CV ECHO MEAS - SV(TEICH): 68.1 ML
BH CV ECHO MEAS - TR MAX VEL: 231 CM/SEC
BH CV ECHO MEASUREMENTS AVERAGE E/E' RATIO: 7.91
LEFT ATRIUM VOLUME INDEX: 24 ML/M2
MAXIMAL PREDICTED HEART RATE: 192 BPM
STRESS TARGET HR: 163 BPM

## 2022-03-07 ENCOUNTER — TELEPHONE (OUTPATIENT)
Dept: CARDIOLOGY | Facility: CLINIC | Age: 27
End: 2022-03-07

## 2022-03-07 NOTE — TELEPHONE ENCOUNTER
Patients wife called and they wanted the results to his echo.     · Left ventricular ejection fraction appears to be 61 - 65%. Left ventricular systolic function is normal.  · Left ventricular diastolic function was normal.  · Abnormal global longitudinal LV strain (GLS) = -15.0%.  · Estimated right ventricular systolic pressure from tricuspid regurgitation is normal (<35 mmHg).    Per  the (GLS) that is -15.0%   It is a computer generated measure for stiffness at this time is is nothing to worry about but it will be monitored.

## 2022-03-08 ENCOUNTER — HOSPITAL ENCOUNTER (OUTPATIENT)
Dept: PHYSICAL THERAPY | Facility: HOSPITAL | Age: 27
Setting detail: THERAPIES SERIES
Discharge: HOME | End: 2022-03-08

## 2022-03-08 DIAGNOSIS — R42 DIZZINESS: ICD-10-CM

## 2022-03-08 DIAGNOSIS — G91.9 HYDROCEPHALUS, UNSPECIFIED TYPE: Primary | ICD-10-CM

## 2022-03-08 DIAGNOSIS — R26.89 POOR BALANCE: ICD-10-CM

## 2022-03-08 PROCEDURE — 97110 THERAPEUTIC EXERCISES: CPT

## 2022-03-08 PROCEDURE — 97112 NEUROMUSCULAR REEDUCATION: CPT

## 2022-03-08 NOTE — THERAPY TREATMENT NOTE
"    Outpatient Physical Therapy Ortho Treatment Note  [facility]     Patient Name: Curtis Garsia  : 1995  MRN: [id number]  Today's Date: 3/8/2022      Visit Date: 2022     Attendance: 7 visits  Subjective improvement: 20% improvement  MD appt: PRN  Recheck due: 3/24/2022    Visit Dx:    ICD-10-CM ICD-9-CM   1. Hydrocephalus, unspecified type (HCC)  G91.9 331.4   2. Dizziness  R42 780.4   3. Poor balance  R26.89 781.99       Patient Active Problem List   Diagnosis   • Hydrocephalus (HCC)   • Palpitations   • S/P  shunt Dr Parks [place] 2021   • JOSHI (dyspnea on exertion)        Past Medical History:   Diagnosis Date   • Anxiety    • Asthma     AS A CHILD    • Balance disorder    • Chiari I malformation (HCC)    • Cholelithiasis         Past Surgical History:   Procedure Laterality Date   • NERVE DECOMPRESSION     • SHUNT REVISION          PT Ortho     Row Name 22 1600       Precautions and Contraindications    Precautions Dizziness, R ventricle shunt placement, hx of chiari decompression  -KM       Subjective Pain    Post-Treatment Pain Level 8  -KM       Posture/Observations    Posture/Observations Comments poor posture. fwd head/ rounded back, cues required throughout.  -KM          User Key  (r) = Recorded By, (t) = Taken By, (c) = Cosigned By    Initials Name Provider Type    Yoselin Domínguez PTA Physical Therapy Assistant                             PT Assessment/Plan     Row Name 22 1600          PT Assessment    Assessment Comments pt not having a good day with neck pain and increase in \"pressure\" making him more dizzy. pt still quite challenged with dynadisc. Did not show very good ability to find and maintain pelvic neutral today. Slight increase in pain post visit.  -KM            PT Plan    PT Frequency 2x/week  -KM     PT Plan Comments Try CC lateral resisted gait next visit.  -KM           User Key  (r) = Recorded By, (t) = Taken By, " (c) = Cosigned By    Initials Name Provider Type    YOVANI Livingston Yoselin G, PTA Physical Therapy Assistant                   OP Exercises     Row Name 03/08/22 1600             Subjective Comments    Subjective Comments pt states that the last couple of days have not been good. States he hasnt been sleeping good.  -KM              Subjective Pain    Able to rate subjective pain? yes  -KM      Pre-Treatment Pain Level 7  neck/back  -KM      Post-Treatment Pain Level 8  -KM              Exercise 1    Exercise Name 1 PROII for strength/endurance  -KM      Time 1 8 min  -KM      Additional Comments L3.5  -KM              Exercise 2    Exercise Name 2 Scalene stretch L  -KM      Reps 2 2  -KM      Time 2 30 sec hold  -KM              Exercise 3    Exercise Name 3 Doorway pec stretch  -KM      Reps 3 2  -KM      Time 3 30 sec hold  -KM              Exercise 4    Exercise Name 4 BOSU fwd step up  -KM      Sets 4 2  -KM      Reps 4 10  -KM      Additional Comments bilateral  -KM              Exercise 5    Exercise Name 5 BOSU lateral step up and over  -KM      Sets 5 2  -KM      Reps 5 10  -KM      Additional Comments bilateral  -KM              Exercise 6    Exercise Name 6 Dynadisc TA/PN + LAQ  -KM      Sets 6 2  -KM      Reps 6 10  -KM              Exercise 7    Exercise Name 7 Dynadisc TA/PN + march  -KM      Sets 7 2  -KM      Reps 7 10  -KM              Exercise 8    Exercise Name 8 Dynadisc TA/PN + mid rows  -KM      Sets 8 2  -KM      Reps 8 10  -KM      Additional Comments green  -KM              Exercise 9    Exercise Name 9 CC resisted gait: fwd; bwd  -KM      Sets 9 1  -KM      Reps 9 5 laps ea  -KM      Additional Comments 1.5 pl  -KM              Exercise 10    Exercise Name 10 Balance course: 6 inch bench, 2 small hurdles, uneven mat, 1 smal/1 tall nitesh, cone  -KM      Sets 10 1  -KM      Reps 10 3 laps  -KM              Exercise 11    Exercise Name 11 Airex pallof press  -KM      Sets 11 1  -KM       Reps 11 15 ea direction  -KM      Additional Comments green  -KM            User Key  (r) = Recorded By, (t) = Taken By, (c) = Cosigned By    Initials Name Provider Type    Yoselin Domínguez PTA Physical Therapy Assistant                              PT OP Goals     Row Name 03/08/22 1600          PT Short Term Goals    STG Date to Achieve 03/09/22  -KM     STG 1 Demonstrate independence/compliance in performance of initial HEP  -KM     STG 1 Progress Met  -KM     STG 2 Improved Dynamic Gait Index to 17/24 to demonstrate improved safey and dynamic balance with functional mobility  -KM     STG 2 Progress Met  -KM     STG 3 Demonstrate improved bilateral ankle DF/PF MMT to 5/5  -KM     STG 3 Progress Met  -KM     STG 4 Perform tandem gait on level ground for 20 ft with no LOB, UE assist, and improve LE control  -KM     STG 4 Progress Met  -KM     STG 5 Demonstrate improved bilateral hip MMT to 4+/5 or greater  -KM     STG 5 Progress Met  -KM            Long Term Goals    LTG Date to Achieve 04/06/22  -KM     LTG 1 Subjectively report 50% overall improvement or greater  -KM     LTG 1 Progress Ongoing  -KM     LTG 2 Improved Dynamic Gait Index to 19/24 to demonstrate improved safey and dynamic balance with functional mobility and decrease fall risk  -KM     LTG 2 Progress Met  -KM     LTG 3 Demonstrate improved bilateral hip MMT to 5/5 in all planes  -KM     LTG 3 Progress Ongoing  -KM     LTG 4 Perform balance/obstacle course consisting of hurdles, BOSU, unlevel/compliant surface, & wedges for incline with no LOB, good overall LE control to demonstrate improved dynamic balance with navigation outside of home  -KM     LTG 4 Progress Ongoing  -KM     LTG 5 Perform airex FA/EO balance with UE dual task activities (paloff press, rows, ball toss, etc) with improved LE control, no LOB  -KM     LTG 5 Progress Partially Met;Progressing  -KM     LTG 6 Demonstrate ability to lift/carry lightly weighted crate from  floor>waist, good lifting mechanics, without LOB  -KM     LTG 6 Progress Ongoing  -KM            Time Calculation    PT Goal Re-Cert Due Date 03/24/22  -KM           User Key  (r) = Recorded By, (t) = Taken By, (c) = Cosigned By    Initials Name Provider Type    Yoselin Domínguez PTA Physical Therapy Assistant                Therapy Education  Given: HEP, Symptoms/condition management, Pain management, Posture/body mechanics, Fall prevention and home safety  Program: Reinforced, Progressed  How Provided: Verbal, Demonstration, Written  Provided to: Patient  Level of Understanding: Teach back education performed, Verbalized, Demonstrated              Time Calculation:   Start Time: 1602  Stop Time: 1700  Time Calculation (min): 58 min  Therapy Charges for Today     Code Description Service Date Service Provider Modifiers Qty    32946247069 HC PT THER PROC EA 15 MIN 3/8/2022 Yoselin Livingston PTA GP, CQ 2    16170407866 HC PT NEUROMUSC RE EDUCATION EA 15 MIN 3/8/2022 Yoselin Livingston PTA GP, CQ 2                    Yoselin Livingston PTA  3/8/2022

## 2022-03-10 ENCOUNTER — HOSPITAL ENCOUNTER (OUTPATIENT)
Dept: PHYSICAL THERAPY | Facility: HOSPITAL | Age: 27
Setting detail: THERAPIES SERIES
Discharge: HOME | End: 2022-03-10

## 2022-03-10 DIAGNOSIS — G91.9 HYDROCEPHALUS, UNSPECIFIED TYPE: Primary | ICD-10-CM

## 2022-03-10 DIAGNOSIS — R42 DIZZINESS: ICD-10-CM

## 2022-03-10 DIAGNOSIS — R26.89 POOR BALANCE: ICD-10-CM

## 2022-03-10 PROCEDURE — 97110 THERAPEUTIC EXERCISES: CPT

## 2022-03-10 PROCEDURE — 97112 NEUROMUSCULAR REEDUCATION: CPT

## 2022-03-10 NOTE — THERAPY TREATMENT NOTE
Outpatient Physical Therapy Ortho Treatment Note  [facility], operated by [facility]     Patient Name: Curtis Garsia  : 1995  MRN: [id number]  Today's Date: 3/10/2022      Visit Date: 03/10/2022    Attendance: 8 visits  Subjective improvement: 20% improvement  MD appt: PRN  Recheck due: 3/24/2022    Visit Dx:    ICD-10-CM ICD-9-CM   1. Hydrocephalus, unspecified type (HCC)  G91.9 331.4   2. Dizziness  R42 780.4   3. Poor balance  R26.89 781.99       Patient Active Problem List   Diagnosis   • Hydrocephalus (HCC)   • Palpitations   • S/P  shunt Dr Charly Li 2021   • JOSHI (dyspnea on exertion)        Past Medical History:   Diagnosis Date   • Anxiety    • Asthma     AS A CHILD    • Balance disorder    • Chiari I malformation (HCC)    • Cholelithiasis         Past Surgical History:   Procedure Laterality Date   • NERVE DECOMPRESSION     • SHUNT REVISION          PT Ortho     Row Name 03/10/22 1600       Subjective Comments    Subjective Comments pain is better today. Neck feels a lot better  -KM       Precautions and Contraindications    Precautions Dizziness, R ventricle shunt placement, hx of chiari decompression  -KM       Subjective Pain    Able to rate subjective pain? yes  -KM    Post-Treatment Pain Level 5  -KM       Posture/Observations    Posture/Observations Comments cues finding PN on dynadisc  -KM          User Key  (r) = Recorded By, (t) = Taken By, (c) = Cosigned By    Initials Name Provider Type    Yoselin Domínguez PTA Physical Therapy Assistant                             PT Assessment/Plan     Row Name 03/10/22 1600          PT Assessment    Assessment Comments pt having less pain today and less dizziness noted today. pt continues being challenged finding PN on dynadisc.  -KM            PT Plan    PT Frequency 2x/week  -KM     PT Plan Comments Update HEP. Body mechanics/crate lift  -KM           User Key  (r) = Recorded By, (t) = Taken By, (c) = Cosigned By     Initials Name Provider Type    YOVANI Livingston Yoselindavid HAMILTON PTA Physical Therapy Assistant                   OP Exercises     Row Name 03/10/22 1600             Subjective Comments    Subjective Comments pain is better today. Neck feels a lot better  -KM              Subjective Pain    Able to rate subjective pain? yes  -KM      Pre-Treatment Pain Level 5  -KM      Post-Treatment Pain Level 5  -KM              Exercise 1    Exercise Name 1 PROII for strength/endurance  -KM      Time 1 8 min  -KM      Additional Comments L3.5  -KM              Exercise 2    Exercise Name 2 Scalene stretch L  -KM      Reps 2 1  -KM      Time 2 30 sec hold  -KM              Exercise 3    Exercise Name 3 Doorway pec stretch  -KM      Reps 3 1  -KM      Time 3 30 sec hold  -KM              Exercise 4    Exercise Name 4 BOSU fwd step up  -KM      Sets 4 1  -KM      Reps 4 10 ea LE  -KM      Additional Comments bilateral  -KM              Exercise 5    Exercise Name 5 BOSU lateral step up and over  -KM      Sets 5 2  -KM      Reps 5 10  -KM      Additional Comments bilateral  -KM              Exercise 6    Exercise Name 6 Reverse BOSU FA balance  -KM      Reps 6 2  -KM      Time 6 30 sec hold  -KM      Additional Comments no UE assist  -KM              Exercise 7    Exercise Name 7 Airex tband palloff press  -KM      Sets 7 1  -KM      Reps 7 15 ea direction  -KM      Additional Comments red  -KM              Exercise 8    Exercise Name 8 CC:resisted gait 4 way  -KM      Sets 8 1  -KM      Reps 8 4 laps ea  -KM      Additional Comments 1.5 pl  -KM              Exercise 9    Exercise Name 9 Balance course: bench step, x2 hurldes, uneven mat, 2 hurdles  -KM      Sets 9 1  -KM      Reps 9 3 laps  -KM              Exercise 10    Exercise Name 10 Dynadisc TA/PN + LAQ  -KM      Sets 10 2  -KM      Reps 10 10  -KM              Exercise 11    Exercise Name 11 Dynadisc TA/PN + march  -KM      Sets 11 2  -KM      Reps 11 10  -KM            User Key   (r) = Recorded By, (t) = Taken By, (c) = Cosigned By    Initials Name Provider Type    Yoselin Domínguez, PTA Physical Therapy Assistant                              PT OP Goals     Row Name 03/10/22 1600          PT Short Term Goals    STG Date to Achieve 03/09/22  -KM     STG 1 Demonstrate independence/compliance in performance of initial HEP  -KM     STG 1 Progress Met  -KM     STG 2 Improved Dynamic Gait Index to 17/24 to demonstrate improved safey and dynamic balance with functional mobility  -KM     STG 2 Progress Met  -KM     STG 3 Demonstrate improved bilateral ankle DF/PF MMT to 5/5  -KM     STG 3 Progress Met  -KM     STG 4 Perform tandem gait on level ground for 20 ft with no LOB, UE assist, and improve LE control  -KM     STG 4 Progress Met  -KM     STG 5 Demonstrate improved bilateral hip MMT to 4+/5 or greater  -KM     STG 5 Progress Met  -KM            Long Term Goals    LTG Date to Achieve 04/06/22  -KM     LTG 1 Subjectively report 50% overall improvement or greater  -KM     LTG 1 Progress Ongoing  -KM     LTG 2 Improved Dynamic Gait Index to 19/24 to demonstrate improved safey and dynamic balance with functional mobility and decrease fall risk  -KM     LTG 2 Progress Met  -KM     LTG 3 Demonstrate improved bilateral hip MMT to 5/5 in all planes  -KM     LTG 3 Progress Ongoing  -KM     LTG 4 Perform balance/obstacle course consisting of hurdles, BOSU, unlevel/compliant surface, & wedges for incline with no LOB, good overall LE control to demonstrate improved dynamic balance with navigation outside of home  -KM     LTG 4 Progress Ongoing  -KM     LTG 5 Perform airex FA/EO balance with UE dual task activities (paloff press, rows, ball toss, etc) with improved LE control, no LOB  -KM     LTG 5 Progress Partially Met;Progressing  -KM     LTG 6 Demonstrate ability to lift/carry lightly weighted crate from floor>waist, good lifting mechanics, without LOB  -KM     LTG 6 Progress Ongoing  -KM             Time Calculation    PT Goal Re-Cert Due Date 03/24/22  -           User Key  (r) = Recorded By, (t) = Taken By, (c) = Cosigned By    Initials Name Provider Type    Yoselin Domínguez PTA Physical Therapy Assistant                Therapy Education  Given: HEP, Symptoms/condition management, Pain management, Posture/body mechanics, Fall prevention and home safety  Program: Reinforced, Progressed  How Provided: Verbal, Demonstration, Written  Provided to: Patient  Level of Understanding: Teach back education performed, Verbalized, Demonstrated              Time Calculation:   Start Time: 1607  Stop Time: 1652  Time Calculation (min): 45 min  Therapy Charges for Today     Code Description Service Date Service Provider Modifiers Qty    76736804589 HC PT THER PROC EA 15 MIN 3/10/2022 Yoselin Livingston PTA GP, CQ 2    57158512886 HC PT NEUROMUSC RE EDUCATION EA 15 MIN 3/10/2022 Yoselin Livingston PTA GP, CQ 1                    Yoselin Livingston PTA  3/10/2022

## 2022-03-15 ENCOUNTER — HOSPITAL ENCOUNTER (OUTPATIENT)
Dept: PHYSICAL THERAPY | Facility: HOSPITAL | Age: 27
Setting detail: THERAPIES SERIES
Discharge: HOME | End: 2022-03-15

## 2022-03-15 DIAGNOSIS — R42 DIZZINESS: ICD-10-CM

## 2022-03-15 DIAGNOSIS — G91.9 HYDROCEPHALUS, UNSPECIFIED TYPE: Primary | ICD-10-CM

## 2022-03-15 DIAGNOSIS — R26.89 POOR BALANCE: ICD-10-CM

## 2022-03-15 PROCEDURE — 97110 THERAPEUTIC EXERCISES: CPT

## 2022-03-15 PROCEDURE — 97112 NEUROMUSCULAR REEDUCATION: CPT

## 2022-03-15 RX ORDER — DULOXETIN HYDROCHLORIDE 60 MG/1
60 CAPSULE, DELAYED RELEASE ORAL DAILY
Qty: 90 CAPSULE | Refills: 3 | Status: SHIPPED | OUTPATIENT
Start: 2022-03-15 | End: 2022-06-07 | Stop reason: SDUPTHER

## 2022-03-15 NOTE — TELEPHONE ENCOUNTER
Rx Refill Note  Requested Prescriptions     Pending Prescriptions Disp Refills   • DULoxetine (CYMBALTA) 60 MG capsule 90 capsule 3     Sig: Take 1 capsule by mouth Daily.      Last office visit with prescribing clinician: 2/2/2022      Next office visit with prescribing clinician: 5/2/2022            Violeta Rose MA  03/15/22, 07:35 CDT

## 2022-03-17 ENCOUNTER — APPOINTMENT (OUTPATIENT)
Dept: PHYSICAL THERAPY | Facility: HOSPITAL | Age: 27
End: 2022-03-17

## 2022-03-21 ENCOUNTER — OFFICE VISIT (OUTPATIENT)
Dept: CARDIOLOGY | Facility: CLINIC | Age: 27
End: 2022-03-21

## 2022-03-21 VITALS
OXYGEN SATURATION: 99 % | HEIGHT: 68 IN | WEIGHT: 211 LBS | BODY MASS INDEX: 31.98 KG/M2 | HEART RATE: 84 BPM | DIASTOLIC BLOOD PRESSURE: 92 MMHG | SYSTOLIC BLOOD PRESSURE: 138 MMHG

## 2022-03-21 DIAGNOSIS — R00.2 PALPITATIONS: Primary | ICD-10-CM

## 2022-03-21 DIAGNOSIS — R03.0 ELEVATED BLOOD PRESSURE READING IN OFFICE WITHOUT DIAGNOSIS OF HYPERTENSION: ICD-10-CM

## 2022-03-21 DIAGNOSIS — E66.09 CLASS 1 OBESITY DUE TO EXCESS CALORIES WITH SERIOUS COMORBIDITY AND BODY MASS INDEX (BMI) OF 32.0 TO 32.9 IN ADULT: ICD-10-CM

## 2022-03-21 DIAGNOSIS — G91.9 HYDROCEPHALUS, UNSPECIFIED TYPE: ICD-10-CM

## 2022-03-21 DIAGNOSIS — R06.09 DOE (DYSPNEA ON EXERTION): ICD-10-CM

## 2022-03-21 PROBLEM — E66.811 CLASS 1 OBESITY DUE TO EXCESS CALORIES WITH SERIOUS COMORBIDITY AND BODY MASS INDEX (BMI) OF 32.0 TO 32.9 IN ADULT: Status: ACTIVE | Noted: 2022-03-21

## 2022-03-21 PROCEDURE — 99214 OFFICE O/P EST MOD 30 MIN: CPT | Performed by: NURSE PRACTITIONER

## 2022-03-21 RX ORDER — CLONAZEPAM 1 MG/1
1 TABLET ORAL DAILY
COMMUNITY
Start: 2022-02-24 | End: 2022-06-08 | Stop reason: SDUPTHER

## 2022-03-21 NOTE — PROGRESS NOTES
Chief Complaint  Palpitations (4wk F/U. States continues to have discomfort at rest and exertion. Stabbing) and Results (Holter/Labs/Echo)    Subjective          Curtis Garsia presents to [facility] for routine follow-up with outpatient testing.  D-dimer and BNP were normal.  14-day Holter monitor revealed predominantly sinus rhythm with rare supraventricular ectopic beats with a PVC burden of less than 1%, rare premature ventricular contractions with a PVC burden of less than 1%, transient Mobitz type I AV block with lowest rate of 30 bpm at 10:24 AM lasting less than 6 seconds, no significant ectopy, no correlated arrhythmia and no significant pauses.  2D echo on 3/4/2022 revealed normal left ventricular systolic function with ejection fraction of 61 to 65%, normal left ventricular diastolic function, abnormal global longitudinal LV strain of -15% and no significant valvular heart disease.  He has hydrocephalus status post  shunt 5/21, Chiari malformation, anxiety and obesity.  He continues to complain of dyspnea with mild to moderate exertion and intermittent palpitations.  Patient denies chest pain, dizziness, syncope, orthopnea, PND, edema or decreased stamina.  Patient denies any signs of bleeding.    Palpitations   This is a recurrent problem. The current episode started more than 1 month ago. The problem occurs intermittently. The problem has been unchanged. Associated symptoms include anxiety and shortness of breath. Pertinent negatives include no chest fullness, chest pain, coughing, diaphoresis, dizziness, fever, irregular heartbeat, malaise/fatigue, nausea, near-syncope, numbness, syncope, vomiting or weakness. Risk factors include obesity. His past medical history is significant for anxiety.   Shortness of Breath  This is a recurrent problem. The current episode started more than 1 month ago. The problem occurs intermittently. Pertinent negatives include no abdominal  "pain, chest pain, claudication, coryza, ear pain, fever, headaches, hemoptysis, leg pain, leg swelling, neck pain, orthopnea, PND, rash, rhinorrhea, sore throat, sputum production, swollen glands, syncope, vomiting or wheezing. The symptoms are aggravated by any activity.       Objective   Vital Signs:   /92   Pulse 84   Ht 172.7 cm (68\")   Wt 95.7 kg (211 lb)   SpO2 99%   BMI 32.08 kg/m²     Vitals and nursing note reviewed.   Constitutional:       General: Awake.      Appearance: Normal and healthy appearance. Well-developed and not in distress. Obese.   Eyes:      General: Lids are normal.      Conjunctiva/sclera: Conjunctivae normal.      Pupils: Pupils are equal, round, and reactive to light.   HENT:      Head: Normocephalic and atraumatic.      Nose: Nose normal.   Neck:      Vascular: No JVR. JVD normal.   Pulmonary:      Effort: Pulmonary effort is normal.      Breath sounds: Normal breath sounds. No wheezing. No rhonchi. No rales.   Chest:      Chest wall: Not tender to palpatation.   Cardiovascular:      PMI at left midclavicular line. Normal rate. Regular rhythm. Normal S1. Normal S2.      Murmurs: There is no murmur.      No gallop. No click. No rub.   Pulses:     Intact distal pulses.   Edema:     Peripheral edema absent.   Abdominal:      General: Bowel sounds are normal.      Palpations: Abdomen is soft.      Tenderness: There is no abdominal tenderness.   Musculoskeletal: Normal range of motion.         General: No tenderness.      Cervical back: Normal range of motion. Skin:     General: Skin is warm and dry.   Neurological:      General: No focal deficit present.      Mental Status: Alert, oriented to person, place, and time and oriented to person, place and time.   Psychiatric:         Attention and Perception: Attention and perception normal.         Mood and Affect: Mood and affect normal.         Speech: Speech normal.         Behavior: Behavior normal. Behavior is cooperative.       "   Thought Content: Thought content normal.         Cognition and Memory: Cognition and memory normal.         Judgment: Judgment normal.        Result Review :   The following data was reviewed by: FRANKY Christianson on 03/21/2022:  Common labs    Common Labsle 2/2/22 2/2/22 2/2/22    0941 0941 0941   Glucose  88    BUN  16    Creatinine  0.94    eGFR Non  Am  96    eGFR  Am  116    Sodium  141    Potassium  4.5    Chloride  107    Calcium  9.2    Total Protein  7.1    Albumin  4.60    Total Bilirubin  0.2    Alkaline Phosphatase  133 (A)    AST (SGOT)  16    ALT (SGPT)  19    WBC 7.21     Hemoglobin 12.2 (A)     Hematocrit 39.0     Platelets 418     Total Cholesterol   201 (A)   Triglycerides   106   HDL Cholesterol   52   LDL Cholesterol    130 (A)   (A) Abnormal value       Comments are available for some flowsheets but are not being displayed.           Data reviewed: Cardiology studies holter monitor 3/20/22 and 2d echo 3/4/22.           Assessment and Plan    Diagnoses and all orders for this visit:    1. Palpitations (Primary)- no correlated arrhythmia on recent holter monitor.     2. JOSHI (dyspnea on exertion)- no significant arrhythmia on recent holter monitor. No structural signs of heart failure on 2d echo. Check CT angiogram of coronary arteries.     3. Hydrocephalus, unspecified type (HCC)- s/p  shunt 5/21.     4. Class 1 obesity due to excess calories with serious comorbidity and body mass index (BMI) of 32.0 to 32.9 in adult- Patient's Body mass index is 32.08 kg/m². indicating that he is obese (BMI >30). Obesity-related health conditions include the following: none. Obesity is unchanged. BMI is is above average; no BMI management plan is appropriate. We discussed low calorie, low carb based diet program, portion control and increasing exercise.    5. Elevated blood pressure reading in office without diagnosis of hypertension- blood pressure is elevated in office today. Pt does not  monitor at home.  Monitor and record daily blood pressure. Report readings consistently higher than 130/90 or consistently lower than 100/60.         Follow Up   Return in about 4 weeks (around 4/18/2022) for Next scheduled follow up.  Patient was given instructions and counseling regarding his condition or for health maintenance advice. Please see specific information pulled into the AVS if appropriate.

## 2022-03-22 ENCOUNTER — HOSPITAL ENCOUNTER (OUTPATIENT)
Dept: PHYSICAL THERAPY | Facility: HOSPITAL | Age: 27
Setting detail: THERAPIES SERIES
Discharge: HOME | End: 2022-03-22

## 2022-03-22 DIAGNOSIS — G91.9 HYDROCEPHALUS, UNSPECIFIED TYPE: Primary | ICD-10-CM

## 2022-03-22 DIAGNOSIS — R42 DIZZINESS: ICD-10-CM

## 2022-03-22 DIAGNOSIS — R26.89 POOR BALANCE: ICD-10-CM

## 2022-03-22 PROCEDURE — 97530 THERAPEUTIC ACTIVITIES: CPT | Performed by: PHYSICAL THERAPIST

## 2022-03-22 PROCEDURE — 97110 THERAPEUTIC EXERCISES: CPT | Performed by: PHYSICAL THERAPIST

## 2022-03-23 DIAGNOSIS — R09.81 COMPLAINT OF NASAL CONGESTION: Primary | ICD-10-CM

## 2022-03-23 RX ORDER — PREDNISONE 10 MG/1
TABLET ORAL
Qty: 21 TABLET | Refills: 0 | Status: SHIPPED | OUTPATIENT
Start: 2022-03-23 | End: 2022-03-30

## 2022-03-23 NOTE — THERAPY PROGRESS REPORT/RE-CERT
Outpatient Physical Therapy Ortho Progress Note  [facility]     Patient Name: Curtis Garsia  : 1995  MRN: [id number]  Today's Date: 3/23/2022      Visit Date: 2022     Attendance: 10 visits  Subjective improvement: 35% improvement  MD appt: PRN  Recheck due: 3/24/2022    Visit Dx:    ICD-10-CM ICD-9-CM   1. Hydrocephalus, unspecified type (HCC)  G91.9 331.4   2. Dizziness  R42 780.4   3. Poor balance  R26.89 781.99       Patient Active Problem List   Diagnosis   • Hydrocephalus (HCC)   • Palpitations   • S/P  shunt Dr Charly Li 2021   • JOSHI (dyspnea on exertion)   • Class 1 obesity due to excess calories with serious comorbidity and body mass index (BMI) of 32.0 to 32.9 in adult   • Elevated blood pressure reading in office without diagnosis of hypertension        Past Medical History:   Diagnosis Date   • Anxiety    • Asthma     AS A CHILD    • Balance disorder    • Chiari I malformation (HCC)    • Cholelithiasis         Past Surgical History:   Procedure Laterality Date   • NERVE DECOMPRESSION     • SHUNT REVISION          PT Ortho     Row Name 22 1600       Precautions and Contraindications    Precautions Dizziness, R ventricle shunt placement, hx of chiari decompression  -KG       Posture/Observations    Posture/Observations Comments Improving postural awareness but still needs intermittent cues throughout. Self-corrects more frequently. Rounded/forward shoulder posture. Slight forward head.  -KG       MMT Right Upper Ext    Rt Shoulder Flexion MMT, Gross Movement (5/5) normal  -KG    Rt Shoulder ABduction MMT, Gross Movement (5/5) normal  -KG    Rt Elbow Flexion MMT, Gross Movement: (5/5) normal  -KG    Rt Elbow Extension MMT, Gross Movement: (5/5) normal  -KG       MMT Left Upper Ext    Lt Shoulder Flexion MMT, Gross Movement (5/5) normal  -KG    Lt Shoulder ABduction MMT, Gross Movement (5/5) normal  -KG    Lt Elbow Flexion MMT, Gross Movement (5/5)  "normal  -KG    Lt Elbow Extension MMT, Gross Movement (5/5) normal  -KG       MMT Right Lower Ext    Rt Hip Flexion MMT, Gross Movement (5/5) normal  -KG    Rt Hip ABduction MMT, Gross Movement (4+/5) good plus  -KG    Rt Hip ADduction MMT, Gross Movement (4+/5) good plus  -KG    Rt Knee Extension MMT, Gross Movement (5/5) normal  -KG    Rt Knee Flexion MMT, Gross Movement (5/5) normal  -KG    Rt Ankle Plantarflexion MMT, Gross Movement (5/5) normal  -KG    Rt Ankle Dorsiflexion MMT, Gross Movement (5/5) normal  -KG       MMT Left Lower Ext    Lt Hip Flexion MMT, Gross Movement (5/5) normal  -KG    Lt Hip ABduction MMT, Gross Movement (4+/5) good plus  -KG    Lt Hip ADduction MMT, Gross Movement (4+/5) good plus  -KG    Lt Knee Extension MMT, Gross Movement (5/5) normal  -KG    Lt Knee Flexion MMT, Gross Movement (5/5) normal  -KG    Lt Ankle Plantarflexion MMT, Gross Movement (5/5) normal  -KG    Lt Ankle Dorsiflexion MMT, Gross Movement (5/5) normal  -KG       Sensation    Sensation WNL? WFL  -KG    Light Touch No apparent deficits  -KG       Upper Extremity Flexibility    Scalenes Bilateral:;Mildly limited  -KG    Upper Trapezius Bilateral:;Mildly limited  -KG    Pect Minor Bilateral:;Mildly limited  -KG    Pect Major Bilateral:;Mildly limited  -KG       Lower Extremity Flexibility    Hamstrings Bilateral:;Moderately limited  -KG    Gastrocnemius Bilateral:;Mildly limited  -KG       Balance Skills Training    SLS 30\" with R/L lead; 1-2 touches at railing with each. Pt quite challenged attempting tband push/pull during SLS on either LE. Limited trunk stabiltiy and LE control.  -KG    Balance Comments Pt able to perform static balance FA on BOSU without assist but with increased focus. Addding ball toss was very challenging. Pt had some mild dizzy symtpoms and had to focus on multiple inputs/tasks, which was affecting his stability, vision, and LE control. Close SBA/CGA was utilized. No LOB on obstacle course and " better control noted with managing incline wedges. See functional outcome measures for dynamic gait index score.  -KG          User Key  (r) = Recorded By, (t) = Taken By, (c) = Cosigned By    Initials Name Provider Type    KG Jaja Browne, PT Physical Therapist                             PT Assessment/Plan     Row Name 03/22/22 1600          PT Assessment    Functional Limitations Decreased safety during functional activities;Impaired gait;Limitation in home management;Limitations in community activities;Performance in leisure activities;Performance in self-care ADL;Limitations in functional capacity and performance  -KG     Impairments Balance;Gait;Impaired muscle endurance;Impaired flexibility;Muscle strength;Pain;Poor body mechanics;Posture;Range of motion  -KG     Assessment Comments Pt continues to make good steady progress with PT. BLE functional strength is improving, as is overall core stability. Pt continues to be challenged in regard core work, matthew when combined with dynamic balance. Better performance with QP activities. Worked on improving PN/trunk positioning while in quadruped with tactile/demo cues, pt had better understanding/form with practice. Doing well with higher level obstacle course without any LOB occurances. Pt overall having less dizziness and reports good improvement in his functional mobility and balance performance at home. Pt did have some issues with dizziness symptoms with ball toss on reverse BOSU. Pt had to really concentrate/focus on maintaining balance/stability with increase in tasks/input. Pt reports compliance with HEP. Pt still needs work on funcitonal core stability, dynamic balance/stability, functional strength, & activity tolerance & will benefit from skilled PT services.  -KG     Rehab Potential Good  -KG     Patient/caregiver participated in establishment of treatment plan and goals Yes  -KG     Patient would benefit from skilled therapy intervention Yes  -KG       "      PT Plan    PT Frequency 1x/week  -KG     Predicted Duration of Therapy Intervention (PT) 4 more visits  -KG     PT Plan Comments Pt has 1 more approved insurance visit. Will ask for more visits but will make sure pt is independent with HEP in case no more visits are approved. Continue working on balance course with dual task - could try holding crate next. Continue  -KG           User Key  (r) = Recorded By, (t) = Taken By, (c) = Cosigned By    Initials Name Provider Type    KG Jaja Browne, PT Physical Therapist                   OP Exercises     Row Name 03/22/22 1600             Subjective Comments    Subjective Comments Pt states he can tell things are slowly still improving  -KG              Subjective Pain    Able to rate subjective pain? yes  -KG      Pre-Treatment Pain Level 4  -KG      Post-Treatment Pain Level 4  -KG              Exercise 1    Exercise Name 1 PROII for strength/endurance  -KG      Time 1 6 min  -KG      Additional Comments L 4.5  -KG              Exercise 2    Exercise Name 2 Scalene stretch chago  -KG      Reps 2 1  -KG      Time 2 30 sec hold  -KG              Exercise 3    Exercise Name 3 Doorway pec stretch  -KG      Reps 3 2  -KG      Time 3 30 sec hold  -KG              Exercise 4    Exercise Name 4 Reverse BOSU mini squats  -KG      Cueing 4 Verbal  -KG      Sets 4 2  -KG      Reps 4 10  -KG              Exercise 5    Exercise Name 5 Reverse BOSU FA static balance with ball toss to clinician  -KG      Cueing 5 Verbal;Tactile  -KG      Sets 5 1  -KG      Reps 5 8 tosses  -KG      Additional Comments red ball  -KG              Exercise 6    Exercise Name 6 Attempted SL balance with tband push/pull and with red ball push/pull  -KG      Additional Comments was able to do a few reps with tband but very unsteady and decreased control at LE; yellow tband  -KG              Exercise 7    Exercise Name 7 Balance course: 6\" bench step, hurdles, unlevel mat, hurdles, wedges for incline "  -KG      Cueing 7 Verbal;Tactile  -KG      Sets 7 1  -KG      Reps 7 4 laps  -KG      Additional Comments 1 lap normal, 1 lap holding weighted bucket in R hand (1 lap in L hand), 1 lap holding weighted med ball  -KG              Exercise 8    Exercise Name 8 Cybex: LP2  -KG      Sets 8 2  -KG      Reps 8 10  -KG      Additional Comments 90#  -KG              Exercise 9    Exercise Name 9 --  -KG      Sets 9 --  -KG      Reps 9 --  -KG      Additional Comments --  -KG              Exercise 10    Exercise Name 10 Quadruped: finding PN and working on better trunk positioning  -KG      Cueing 10 Verbal;Tactile;Demo  -KG      Time 10 3-4 min  -KG              Exercise 11    Exercise Name 11 QP alt UE  -KG      Cueing 11 Verbal  -KG      Sets 11 1  -KG      Reps 11 10  -KG              Exercise 12    Exercise Name 12 Quadruped alt LE  -KG      Cueing 12 Verbal;Tactile  -KG      Reps 12 1x10  -KG      Additional Comments blue 1/2 bolster at low back  -KG            User Key  (r) = Recorded By, (t) = Taken By, (c) = Cosigned By    Initials Name Provider Type    KG Jaja Browne, PT Physical Therapist                              PT OP Goals     Row Name 03/22/22 1600          PT Short Term Goals    STG Date to Achieve 03/09/22  -KG     STG 1 Demonstrate independence/compliance in performance of initial HEP  -KG     STG 1 Progress Met  -KG     STG 2 Improved Dynamic Gait Index to 17/24 to demonstrate improved safey and dynamic balance with functional mobility  -KG     STG 2 Progress Met  -KG     STG 3 Demonstrate improved bilateral ankle DF/PF MMT to 5/5  -KG     STG 3 Progress Met  -KG     STG 4 Perform tandem gait on level ground for 20 ft with no LOB, UE assist, and improve LE control  -KG     STG 4 Progress Met  -KG     STG 5 Demonstrate improved bilateral hip MMT to 4+/5 or greater  -KG     STG 5 Progress Met  -KG            Long Term Goals    LTG Date to Achieve 04/12/22  -KG     LTG 1 Subjectively report 50%  overall improvement or greater  -KG     LTG 1 Progress Ongoing  -KG     LTG 2 Improved Dynamic Gait Index to 19/24 to demonstrate improved safey and dynamic balance with functional mobility and decrease fall risk  -KG     LTG 2 Progress Met  -KG     LTG 3 Demonstrate improved bilateral hip MMT to 5/5 in all planes  -KG     LTG 3 Progress Partially Met;Progressing  -KG     LTG 4 Perform balance/obstacle course consisting of hurdles, BOSU, unlevel/compliant surface, & wedges for incline with no LOB, good overall LE control to demonstrate improved dynamic balance with navigation outside of home  -KG     LTG 4 Progress Met  -KG     LTG 5 Perform airex FA/EO balance with UE dual task activities (paloff press, rows, ball toss, etc) with improved LE control, no LOB  -KG     LTG 5 Progress Partially Met;Progressing  -KG     LTG 6 Demonstrate ability to lift/carry lightly weighted crate from floor>waist, good lifting mechanics, without LOB  -KG     LTG 6 Progress Ongoing  -KG            Time Calculation    PT Goal Re-Cert Due Date 04/12/22  -KG           User Key  (r) = Recorded By, (t) = Taken By, (c) = Cosigned By    Initials Name Provider Type    KG Jaja Browne, PT Physical Therapist                Therapy Education  Education Details: Quadruped alt LE; working on positioning of hips/low back  Given: HEP, Symptoms/condition management, Pain management, Posture/body mechanics, Fall prevention and home safety  Program: Reinforced, Progressed  How Provided: Verbal, Demonstration, Written  Provided to: Patient  Level of Understanding: Teach back education performed, Verbalized, Demonstrated    Outcome Measure Options: Dynamic Gait Index, Dizziness Handicap Inventory (DHI: 78 = severe handicap)  Dynamic Gait Index (DGI)  Gait Level Surface: Normal: walks 20’, no assistive devices, good speed, no evidence for imbalance, normal gait pattern  Change in Gait Speed: Normal: Able to smoothly change walking speed without loss  of balance or gait deviation. Shows significant difference in walking speeds between normal, fast and slow paces.  Gait with Horizontal Head Turns: Mild impairment: Performs head turns smoothly with slight change in gait velocity, i.e. minor disruption to smooth gait path or uses walking aid.  Gait with Vertical Head Turns: Mild impairment: Performs head turns smoothly with slight change in gait velocity, i.e. minor disruption to smooth gait path or uses walking aid.  Gait and Pivot Turn: Mild impairment: pivot turns safely in >3 seconds and stops with no loss of balance.  Step Over Obstacle: Normal: Is able to step over box without changing gait speed, no evidence for imbalance.  Step Around Obstacles: Normal: Is able to walk safely around cones safely without changing gait speed, no evidence of imbalance.  Steps: Mild impairment: Alternating feet, must use rail.  Dynamic Gait Index Score: 20  Dynamic Gait Index Comments: </= 19/24: increased fall risk      Time Calculation:   Start Time: 1603  Stop Time: 1650  Time Calculation (min): 47 min  Therapy Charges for Today     Code Description Service Date Service Provider Modifiers Qty    38062714933 HC PT THER PROC EA 15 MIN 3/22/2022 Jaja Browne, PT GP 2    14559506897 HC PT THERAPEUTIC ACT EA 15 MIN 3/22/2022 Jaja Browne, PT GP 1          PT G-Codes  Outcome Measure Options: Dynamic Gait Index, Dizziness Handicap Inventory (DHI: 78 = severe handicap)         Jaja Browne, PT  3/23/2022

## 2022-03-29 ENCOUNTER — APPOINTMENT (OUTPATIENT)
Dept: PHYSICAL THERAPY | Facility: HOSPITAL | Age: 27
End: 2022-03-29

## 2022-03-30 ENCOUNTER — OFFICE VISIT (OUTPATIENT)
Dept: FAMILY MEDICINE CLINIC | Facility: CLINIC | Age: 27
End: 2022-03-30

## 2022-03-30 ENCOUNTER — APPOINTMENT (OUTPATIENT)
Dept: CT IMAGING | Facility: HOSPITAL | Age: 27
End: 2022-03-30

## 2022-03-30 VITALS
DIASTOLIC BLOOD PRESSURE: 84 MMHG | BODY MASS INDEX: 31.61 KG/M2 | HEIGHT: 68 IN | WEIGHT: 208.6 LBS | TEMPERATURE: 98.1 F | HEART RATE: 78 BPM | OXYGEN SATURATION: 99 % | SYSTOLIC BLOOD PRESSURE: 126 MMHG

## 2022-03-30 DIAGNOSIS — F51.01 PRIMARY INSOMNIA: ICD-10-CM

## 2022-03-30 DIAGNOSIS — R11.10 NON-INTRACTABLE VOMITING, PRESENCE OF NAUSEA NOT SPECIFIED, UNSPECIFIED VOMITING TYPE: Primary | ICD-10-CM

## 2022-03-30 PROCEDURE — 99214 OFFICE O/P EST MOD 30 MIN: CPT | Performed by: NURSE PRACTITIONER

## 2022-03-30 RX ORDER — TRAZODONE HYDROCHLORIDE 50 MG/1
TABLET ORAL
Qty: 60 TABLET | Refills: 0 | Status: SHIPPED | OUTPATIENT
Start: 2022-03-30 | End: 2022-07-07 | Stop reason: SDUPTHER

## 2022-03-30 RX ORDER — PANTOPRAZOLE SODIUM 40 MG/1
40 TABLET, DELAYED RELEASE ORAL DAILY
Qty: 30 TABLET | Refills: 0 | Status: SHIPPED | OUTPATIENT
Start: 2022-03-30 | End: 2022-05-02 | Stop reason: SDUPTHER

## 2022-03-30 NOTE — PROGRESS NOTES
CC: vomitting    History:  Curtis Garsia is a 26 y.o. male who presents today for evaluation of the above problems.      Has been vomitting daily for the past two years. Usually happens at night in the morning. Definitely occurs if he eats after 8.   Red sauce and chips will cause it frequently. At times he does notice heartburn at the time as well.     In addition to this, patient reports that he is unable to go to sleep and frequently is awake until around 3 am.      HPI  ROS:  Review of Systems   Gastrointestinal: Positive for abdominal distention and vomiting.   Psychiatric/Behavioral: Positive for sleep disturbance.       No Known Allergies  Past Medical History:   Diagnosis Date   • Anxiety    • Asthma     AS A CHILD    • Balance disorder    • Chiari I malformation (HCC)    • Cholelithiasis      Past Surgical History:   Procedure Laterality Date   • NERVE DECOMPRESSION  2011   • SHUNT REVISION       Family History   Problem Relation Age of Onset   • Chiari malformation Mother    • Stroke Mother    • Heart disease Mother    • ADD / ADHD Father    • Heart disease Father    • Heart disease Brother    • Thyroid disease Maternal Aunt    • Heart disease Maternal Aunt    • Hyperlipidemia Maternal Uncle    • No Known Problems Paternal Aunt    • ADD / ADHD Paternal Uncle    • Heart disease Paternal Uncle    • Diabetes Paternal Uncle    • No Known Problems Maternal Grandmother    • Heart disease Maternal Grandfather    • Diabetes Paternal Grandmother    • Heart disease Paternal Grandfather       reports that he has never smoked. He has never used smokeless tobacco. He reports previous alcohol use. He reports that he does not use drugs.      Current Outpatient Medications:   •  Butalbital-APAP-Caff-Cod -70-30 MG capsule, Every 6 (Six) Hours., Disp: , Rfl:   •  clonazePAM (KlonoPIN) 1 MG tablet, Take 1 mg by mouth Daily., Disp: , Rfl:   •  DULoxetine (CYMBALTA) 60 MG capsule, Take 1 capsule by mouth Daily.,  "Disp: 90 capsule, Rfl: 3  •  gabapentin (NEURONTIN) 300 MG capsule, Take 300 mg by mouth 3 (Three) Times a Day., Disp: , Rfl:   •  ondansetron (ZOFRAN) 4 MG tablet, Take 4 mg by mouth Every 6 (Six) Hours As Needed for Nausea., Disp: , Rfl:   •  topiramate (TOPAMAX) 100 MG tablet, Take 125 mg by mouth 2 (Two) Times a Day., Disp: , Rfl:   •  pantoprazole (Protonix) 40 MG EC tablet, Take 1 tablet by mouth Daily., Disp: 30 tablet, Rfl: 0  •  traZODone (DESYREL) 50 MG tablet, Take 1-2 tabs nightly for sleep., Disp: 60 tablet, Rfl: 0    OBJECTIVE:  /84 (BP Location: Left arm, Patient Position: Sitting, Cuff Size: Adult)   Pulse 78   Temp 98.1 °F (36.7 °C) (Temporal)   Ht 172.7 cm (68\")   Wt 94.6 kg (208 lb 9.6 oz)   SpO2 99%   BMI 31.72 kg/m²    Physical Exam  Vitals reviewed.   Constitutional:       Appearance: He is well-developed.   Cardiovascular:      Rate and Rhythm: Normal rate and regular rhythm.      Heart sounds: Normal heart sounds.   Pulmonary:      Effort: Pulmonary effort is normal.      Breath sounds: Normal breath sounds.   Abdominal:      General: Abdomen is flat.      Palpations: Abdomen is soft.      Tenderness: There is abdominal tenderness (RUQ).   Neurological:      Mental Status: He is alert and oriented to person, place, and time.   Psychiatric:         Behavior: Behavior normal.         Assessment/Plan    Diagnoses and all orders for this visit:    1. Non-intractable vomiting, presence of nausea not specified, unspecified vomiting type (Primary)  -     pantoprazole (Protonix) 40 MG EC tablet; Take 1 tablet by mouth Daily.  Dispense: 30 tablet; Refill: 0    2. Primary insomnia  -     traZODone (DESYREL) 50 MG tablet; Take 1-2 tabs nightly for sleep.  Dispense: 60 tablet; Refill: 0        An After Visit Summary was printed and given to the patient at discharge.  Return in about 1 month (around 4/30/2022) for Recheck - Emesis.       FRANKY Fitzgerald 3/30/22    Electronically signed.  "

## 2022-04-01 ENCOUNTER — HOSPITAL ENCOUNTER (OUTPATIENT)
Dept: PHYSICAL THERAPY | Facility: HOSPITAL | Age: 27
Setting detail: THERAPIES SERIES
Discharge: HOME | End: 2022-04-01

## 2022-04-01 DIAGNOSIS — R26.89 POOR BALANCE: ICD-10-CM

## 2022-04-01 DIAGNOSIS — G91.9 HYDROCEPHALUS, UNSPECIFIED TYPE: Primary | ICD-10-CM

## 2022-04-01 DIAGNOSIS — R42 DIZZINESS: ICD-10-CM

## 2022-04-01 PROCEDURE — 97112 NEUROMUSCULAR REEDUCATION: CPT | Performed by: PHYSICAL THERAPIST

## 2022-04-01 PROCEDURE — 97110 THERAPEUTIC EXERCISES: CPT | Performed by: PHYSICAL THERAPIST

## 2022-04-02 NOTE — THERAPY TREATMENT NOTE
Outpatient Physical Therapy Ortho Treatment Note  [facility]     Patient Name: Curtis Garsia  : 1995  MRN: [id number]  Today's Date: 2022      Visit Date: 2022     Attendance: 11 visits  Subjective improvement: 35% improvement  MD appt: PRN  Recheck due: 2022    Visit Dx:    ICD-10-CM ICD-9-CM   1. Hydrocephalus, unspecified type (HCC)  G91.9 331.4   2. Dizziness  R42 780.4   3. Poor balance  R26.89 781.99       Patient Active Problem List   Diagnosis   • Hydrocephalus (HCC)   • Palpitations   • S/P  shunt Dr Charly Li 2021   • JOSHI (dyspnea on exertion)   • Class 1 obesity due to excess calories with serious comorbidity and body mass index (BMI) of 32.0 to 32.9 in adult   • Elevated blood pressure reading in office without diagnosis of hypertension        Past Medical History:   Diagnosis Date   • Anxiety    • Asthma     AS A CHILD    • Balance disorder    • Chiari I malformation (HCC)    • Cholelithiasis         Past Surgical History:   Procedure Laterality Date   • NERVE DECOMPRESSION     • SHUNT REVISION          PT Ortho     Row Name 22 0800       Subjective Comments    Subjective Comments Pt states he saw his chiari specialist and was really pleased with the appt. Going to get new scans and he thinkgs that some of his cognitive and dizziness issues are chiari related. Neck very stiff and sore today.  -KG       Precautions and Contraindications    Precautions Dizziness, R ventricle shunt placement, hx of chiari decompression  -KG       Subjective Pain    Able to rate subjective pain? yes  -KG    Pre-Treatment Pain Level 6  -KG    Post-Treatment Pain Level 2  -KG       Posture/Observations    Posture/Observations Comments Continues to need postural cues. Guarding at cervical spine.  -KG          User Key  (r) = Recorded By, (t) = Taken By, (c) = Cosigned By    Initials Name Provider Type    Jaja Desai, PT Physical Therapist                              PT Assessment/Plan     Row Name 04/01/22 0800          PT Assessment    Assessment Comments Pt very challenged with SL balance with added ploytoss. Frequent touches to floor with opp LE. Doing much better with obstacle course with better overall control at LE's, even with holding med ball during performance. Decreased stability noted with half-kneeling pallof press but does focusing to improve control; cues for TA  -KG            PT Plan    PT Frequency 1x/week  -KG     PT Plan Comments Continue half kneeling activities. Could benefit from more manual work  -KG           User Key  (r) = Recorded By, (t) = Taken By, (c) = Cosigned By    Initials Name Provider Type    KG Jaja Browne, PT Physical Therapist                   OP Exercises     Row Name 04/01/22 0900 04/01/22 0800          Subjective Comments    Subjective Comments -- Pt states he saw his chiari specialist and was really pleased with the appt. Going to get new scans and he thinkgs that some of his cognitive and dizziness issues are chiari related. Neck very stiff and sore today.  -KG            Subjective Pain    Able to rate subjective pain? -- yes  -KG     Pre-Treatment Pain Level -- 6  -KG     Post-Treatment Pain Level -- 2  -KG            Exercise 1    Exercise Name 1 PROII for strength/endurance  -KG --     Time 1 8 min  -KG --     Additional Comments L 4.0  -KG --            Exercise 2    Exercise Name 2 Scalene stretch chago  -KG --     Reps 2 2 ea  -KG --     Time 2 30 sec hold  -KG --            Exercise 3    Exercise Name 3 Reverse BOSU mini squats  -KG --     Cueing 3 Verbal  -KG --     Sets 3 2  -KG --     Reps 3 10  -KG --     Time 3 --  -KG --            Exercise 4    Exercise Name 4 Airex FT ball toss to plyoback  -KG --     Cueing 4 Verbal  -KG --     Sets 4 1  -KG --     Reps 4 12 tosses  -KG --     Additional Comments red inflatable ball  -KG --            Exercise 5    Exercise Name 5 R/L SL balance with plyotoss   -KG --     Cueing 5 Verbal;Tactile  -KG --     Sets 5 1  -KG --     Reps 5 8 tosses ea LE  -KG --            Exercise 6    Exercise Name 6 Half kneeling pallof press  -KG --     Cueing 6 Verbal  -KG --     Sets 6 1  -KG --     Reps 6 15 ea direction  -KG --     Additional Comments red tband  -KG --            Exercise 7    Exercise Name 7 Balance course: BOSU, 1 tall/short/tall/short nitesh, unlevel mat, 1 tall/1short nitesh, wedges for incline  -KG --     Cueing 7 Verbal  -KG --     Sets 7 1  -KG --     Reps 7 3 laps  -KG --     Additional Comments holding weighted med ball  -KG --            Exercise 8    Exercise Name 8 Cybex: LP2  -KG --     Cueing 8 Verbal  -KG --     Sets 8 2  -KG --     Reps 8 10  -KG --            Exercise 9    Exercise Name 9 Manual (performed after stretches)  -KG --           User Key  (r) = Recorded By, (t) = Taken By, (c) = Cosigned By    Initials Name Provider Type    KG Jaja Browne, PT Physical Therapist                         Manual Rx (last 36 hours)     Manual Treatments     Row Name 04/01/22 0800             Manual Rx 1    Manual Rx 1 Location Cervical spine; Sub-occipitals, scalenes, UT's, cervical parapsinals  -KG      Manual Rx 1 Type gentle manual distraction; STM/MFR  -KG      Manual Rx 1 Duration 7 min  -KG            User Key  (r) = Recorded By, (t) = Taken By, (c) = Cosigned By    Initials Name Provider Type    KG Jaja Browne, PT Physical Therapist                 PT OP Goals     Row Name 04/01/22 0800          PT Short Term Goals    STG Date to Achieve 03/09/22  -KG     STG 1 Demonstrate independence/compliance in performance of initial HEP  -KG     STG 1 Progress Met  -KG     STG 2 Improved Dynamic Gait Index to 17/24 to demonstrate improved safey and dynamic balance with functional mobility  -KG     STG 2 Progress Met  -KG     STG 3 Demonstrate improved bilateral ankle DF/PF MMT to 5/5  -KG     STG 3 Progress Met  -KG     STG 4 Perform tandem gait on level  ground for 20 ft with no LOB, UE assist, and improve LE control  -KG     STG 4 Progress Met  -KG     STG 5 Demonstrate improved bilateral hip MMT to 4+/5 or greater  -KG     STG 5 Progress Met  -KG            Long Term Goals    LTG Date to Achieve 04/12/22  -KG     LTG 1 Subjectively report 50% overall improvement or greater  -KG     LTG 1 Progress Ongoing  -KG     LTG 2 Improved Dynamic Gait Index to 19/24 to demonstrate improved safey and dynamic balance with functional mobility and decrease fall risk  -KG     LTG 2 Progress Met  -KG     LTG 3 Demonstrate improved bilateral hip MMT to 5/5 in all planes  -KG     LTG 3 Progress Partially Met;Progressing  -KG     LTG 4 Perform balance/obstacle course consisting of hurdles, BOSU, unlevel/compliant surface, & wedges for incline with no LOB, good overall LE control to demonstrate improved dynamic balance with navigation outside of home  -KG     LTG 4 Progress Met  -KG     LTG 5 Perform airex FA/EO balance with UE dual task activities (paloff press, rows, ball toss, etc) with improved LE control, no LOB  -KG     LTG 5 Progress Partially Met;Progressing  -KG     LTG 6 Demonstrate ability to lift/carry lightly weighted crate from floor>waist, good lifting mechanics, without LOB  -KG     LTG 6 Progress Ongoing  -KG            Time Calculation    PT Goal Re-Cert Due Date 04/12/22  -KG           User Key  (r) = Recorded By, (t) = Taken By, (c) = Cosigned By    Initials Name Provider Type    KG Jaja Browne, PT Physical Therapist                Therapy Education  Given: HEP, Symptoms/condition management, Pain management, Posture/body mechanics, Fall prevention and home safety  Program: Reinforced  How Provided: Verbal, Demonstration, Written  Provided to: Patient  Level of Understanding: Teach back education performed, Verbalized, Demonstrated              Time Calculation:   Start Time: 0846  Stop Time: 0935  Time Calculation (min): 49 min  Therapy Charges for Today      Code Description Service Date Service Provider Modifiers Qty    68407972944 HC PT THER PROC EA 15 MIN 4/1/2022 Jaja Browne, PT GP 2    38629812682 HC PT NEUROMUSC RE EDUCATION EA 15 MIN 4/1/2022 Jaja Browne, PT GP 1                    Jaja Browne, PT  4/1/2022

## 2022-04-05 ENCOUNTER — HOSPITAL ENCOUNTER (OUTPATIENT)
Dept: CT IMAGING | Facility: HOSPITAL | Age: 27
Discharge: HOME | End: 2022-04-05
Admitting: NURSE PRACTITIONER

## 2022-04-05 VITALS
HEIGHT: 68 IN | TEMPERATURE: 96.6 F | RESPIRATION RATE: 18 BRPM | WEIGHT: 215.83 LBS | SYSTOLIC BLOOD PRESSURE: 105 MMHG | HEART RATE: 79 BPM | BODY MASS INDEX: 32.71 KG/M2 | DIASTOLIC BLOOD PRESSURE: 64 MMHG | OXYGEN SATURATION: 98 %

## 2022-04-05 DIAGNOSIS — R06.09 DOE (DYSPNEA ON EXERTION): ICD-10-CM

## 2022-04-05 LAB
CREAT SERPL-MCNC: 0.76 MG/DL (ref 0.76–1.27)
EGFRCR SERPLBLD CKD-EPI 2021: 125.6 ML/MIN/1.73

## 2022-04-05 PROCEDURE — 75574 CT ANGIO HRT W/3D IMAGE: CPT

## 2022-04-05 PROCEDURE — 0 IOPAMIDOL PER 1 ML: Performed by: NURSE PRACTITIONER

## 2022-04-05 PROCEDURE — 75574 CT ANGIO HRT W/3D IMAGE: CPT | Performed by: INTERNAL MEDICINE

## 2022-04-05 PROCEDURE — 82565 ASSAY OF CREATININE: CPT | Performed by: INTERNAL MEDICINE

## 2022-04-05 RX ORDER — NITROGLYCERIN 0.4 MG/1
0.4 TABLET SUBLINGUAL
Status: DISCONTINUED | OUTPATIENT
Start: 2022-04-05 | End: 2022-04-06 | Stop reason: HOSPADM

## 2022-04-05 RX ORDER — LIDOCAINE HYDROCHLORIDE 10 MG/ML
5 INJECTION, SOLUTION EPIDURAL; INFILTRATION; INTRACAUDAL; PERINEURAL AS NEEDED
Status: DISCONTINUED | OUTPATIENT
Start: 2022-04-05 | End: 2022-04-06 | Stop reason: HOSPADM

## 2022-04-05 RX ORDER — SODIUM CHLORIDE 0.9 % (FLUSH) 0.9 %
10 SYRINGE (ML) INJECTION AS NEEDED
Status: DISCONTINUED | OUTPATIENT
Start: 2022-04-05 | End: 2022-04-06 | Stop reason: HOSPADM

## 2022-04-05 RX ORDER — SODIUM CHLORIDE 0.9 % (FLUSH) 0.9 %
3 SYRINGE (ML) INJECTION EVERY 12 HOURS SCHEDULED
Status: DISCONTINUED | OUTPATIENT
Start: 2022-04-05 | End: 2022-04-06 | Stop reason: HOSPADM

## 2022-04-05 RX ORDER — METOPROLOL TARTRATE 100 MG/1
100 TABLET ORAL ONCE AS NEEDED
Status: DISCONTINUED | OUTPATIENT
Start: 2022-04-05 | End: 2022-04-06 | Stop reason: HOSPADM

## 2022-04-05 RX ORDER — NITROGLYCERIN 0.4 MG/1
TABLET SUBLINGUAL
Status: COMPLETED
Start: 2022-04-05 | End: 2022-04-05

## 2022-04-05 RX ORDER — METOPROLOL TARTRATE 50 MG/1
50 TABLET, FILM COATED ORAL ONCE AS NEEDED
Status: DISCONTINUED | OUTPATIENT
Start: 2022-04-05 | End: 2022-04-06 | Stop reason: HOSPADM

## 2022-04-05 RX ADMIN — NITROGLYCERIN 0.4 MG: 0.4 TABLET SUBLINGUAL at 11:55

## 2022-04-05 RX ADMIN — IOPAMIDOL 100 ML: 755 INJECTION, SOLUTION INTRAVENOUS at 12:18

## 2022-04-08 ENCOUNTER — APPOINTMENT (OUTPATIENT)
Dept: PHYSICAL THERAPY | Facility: HOSPITAL | Age: 27
End: 2022-04-08

## 2022-04-15 ENCOUNTER — HOSPITAL ENCOUNTER (OUTPATIENT)
Dept: PHYSICAL THERAPY | Facility: HOSPITAL | Age: 27
Setting detail: THERAPIES SERIES
Discharge: HOME | End: 2022-04-15

## 2022-04-15 DIAGNOSIS — G91.9 HYDROCEPHALUS, UNSPECIFIED TYPE: Primary | ICD-10-CM

## 2022-04-15 DIAGNOSIS — R42 DIZZINESS: ICD-10-CM

## 2022-04-15 DIAGNOSIS — R26.89 POOR BALANCE: ICD-10-CM

## 2022-04-15 PROCEDURE — 97110 THERAPEUTIC EXERCISES: CPT | Performed by: PHYSICAL THERAPIST

## 2022-04-15 PROCEDURE — 97112 NEUROMUSCULAR REEDUCATION: CPT | Performed by: PHYSICAL THERAPIST

## 2022-04-16 NOTE — THERAPY PROGRESS REPORT/RE-CERT
Outpatient Physical Therapy Ortho Progress Note  [facility]     Patient Name: Curtis Garsia  : 1995  MRN: [id number]  Today's Date: 4/15/2022      Visit Date: 04/15/2022     Attendance: 12 visits  Subjective improvement: 45-50% improvement  MD appt: PRN  Recheck due: 2022    Visit Dx:    ICD-10-CM ICD-9-CM   1. Hydrocephalus, unspecified type (HCC)  G91.9 331.4   2. Dizziness  R42 780.4   3. Poor balance  R26.89 781.99       Patient Active Problem List   Diagnosis   • Hydrocephalus (HCC)   • Palpitations   • S/P  shunt Dr Charly Li 2021   • JOSHI (dyspnea on exertion)   • Class 1 obesity due to excess calories with serious comorbidity and body mass index (BMI) of 32.0 to 32.9 in adult   • Elevated blood pressure reading in office without diagnosis of hypertension        Past Medical History:   Diagnosis Date   • Anxiety    • Asthma     AS A CHILD    • Balance disorder    • Chiari I malformation (HCC)    • Cholelithiasis         Past Surgical History:   Procedure Laterality Date   • NERVE DECOMPRESSION     • SHUNT REVISION                        PT Ortho     Row Name 04/15/22 0800       Subjective Comments    Subjective Comments Pt apologizes for missing last week. Reports it was a rough week. Had to go to the ER because he hit his head really hard on the side of his shunt. Everything was ok with the part of shunt in his ventricle but had to go to [place] for additional imaging because the part of the shunt in his chest has shifted completely from the R to the L side. May have to have surgery to move it back. It also may be why he is having his heart issues. States overall he feels like he is still making good gains with PT. Reports 45-50% overall improvement. States he still wants to work more on his balance, coordination, and some strength  -KG       Precautions and Contraindications    Precautions Dizziness, R ventricle shunt placement, hx of chiari  decompression  -KG       Subjective Pain    Able to rate subjective pain? yes  -KG    Pre-Treatment Pain Level 4  -KG    Post-Treatment Pain Level 4  -KG       Posture/Observations    Posture/Observations Comments Intermittent postural cues needed throughout but self-correcting frequently. Rounded/fwd shoulders. Challenged with posture/core stability seated on physioball for some therex.  -KG       Special Tests/Palpation    Special Tests/Palpation Cervical/Thoracic  -KG       Cervical Palpation    Cervical Palpation- Location? Suboccipital;Upper traps;Levator scapula;Scalenes  -KG    Suboccipital Bilateral:;Tender;Guarded/taut  -KG    Scalenes Bilateral:;Tender;Guarded/taut  -KG    Levator Scapula Bilateral:;Tender;Guarded/taut  -KG    Upper Traps Bilateral:;Tender;Guarded/taut  -KG       General ROM    GENERAL ROM COMMENTS Bilateral UE/LE AROM WFL. Some mild limtations in cervical mobiltiy but improvement noted  -KG       MMT Right Upper Ext    Rt Shoulder Flexion MMT, Gross Movement (5/5) normal  -KG    Rt Shoulder ABduction MMT, Gross Movement (5/5) normal  -KG    Rt Elbow Flexion MMT, Gross Movement: (5/5) normal  -KG    Rt Elbow Extension MMT, Gross Movement: (5/5) normal  -KG       MMT Left Upper Ext    Lt Shoulder Flexion MMT, Gross Movement (5/5) normal  -KG    Lt Shoulder ABduction MMT, Gross Movement (5/5) normal  -KG    Lt Elbow Flexion MMT, Gross Movement (5/5) normal  -KG    Lt Elbow Extension MMT, Gross Movement (5/5) normal  -KG       MMT Right Lower Ext    Rt Hip Flexion MMT, Gross Movement (5/5) normal  -KG    Rt Hip ABduction MMT, Gross Movement (4+/5) good plus  -KG    Rt Hip ADduction MMT, Gross Movement (4+/5) good plus  -KG    Rt Knee Extension MMT, Gross Movement (5/5) normal  -KG    Rt Knee Flexion MMT, Gross Movement (5/5) normal  -KG    Rt Ankle Plantarflexion MMT, Gross Movement (5/5) normal  -KG    Rt Ankle Dorsiflexion MMT, Gross Movement (5/5) normal  -KG       MMT Left Lower Ext    Lt  Hip Flexion MMT, Gross Movement (5/5) normal  -KG    Lt Hip ABduction MMT, Gross Movement (4+/5) good plus  -KG    Lt Hip ADduction MMT, Gross Movement (4+/5) good plus  -KG    Lt Knee Extension MMT, Gross Movement (5/5) normal  -KG    Lt Knee Flexion MMT, Gross Movement (5/5) normal  -KG    Lt Ankle Plantarflexion MMT, Gross Movement (5/5) normal  -KG    Lt Ankle Dorsiflexion MMT, Gross Movement (5/5) normal  -KG       Sensation    Sensation WNL? WFL  -KG    Light Touch No apparent deficits  -KG       Upper Extremity Flexibility    Scalenes Bilateral:;Mildly limited  -KG    Upper Trapezius Bilateral:;Mildly limited  -KG    Pect Minor Bilateral:;Mildly limited  -KG    Pect Major Bilateral:;Mildly limited  -KG       Lower Extremity Flexibility    Hamstrings Bilateral:;Moderately limited  -KG       Balance Skills Training    Balance Comments Pt able to perform static balance FA on reverse BOSU without assist but with increased focus. More challenging with dynamic reaching/touching wall, both ipsilateral and reaching across midline but was able to do without LOB. Pt has to really focus on multiple inputs/tasks when performing dual task activities, which was affecting his stability, vision, and LE control. No LOB on obstacle course and better control noted with managing incline wedges. See functional outcome measures for dynamic gait index score.  -KG       Gait/Stairs (Locomotion)    Comment, (Gait/Stairs) Non-antalgic gait with no AD  -KG          User Key  (r) = Recorded By, (t) = Taken By, (c) = Cosigned By    Initials Name Provider Type    KG Jaja Browne, PT Physical Therapist                    PT Assessment/Plan     Row Name 04/15/22 0800          PT Assessment    Functional Limitations Decreased safety during functional activities;Impaired gait;Limitation in home management;Limitations in community activities;Performance in leisure activities;Performance in self-care ADL;Limitations in functional capacity  and performance  -KG     Impairments Balance;Gait;Impaired muscle endurance;Impaired flexibility;Muscle strength;Pain;Poor body mechanics;Posture;Range of motion  -KG     Assessment Comments Pt continues to make good steady progress with PT. Had to miss last week, hit his head really hard and went to the ED in [place] to make sure everything was okay with his shunt; was having some HA’s, new symptoms. No issues with shunt in ventricles but the distal/tip of the shunt has shifted from the R to the L. Reports he is getting additional imaging and may or may not have to have another surgery if it needs to be moved. Overall pt is making good gains with PT. Core stability/endurance is improving but still challenged with activities focusing on stability. Dual task activities continue to be difficult as pt has to really focus on managing multiple inputs in order to maintain good balance & stability. Was able to display improved LE/core control with standing on reverse BOSU & perform multi-directional touches on targets on wall, requiring pt to reach outside of [place]. Pt is more aware of his posture/body mechanics with therex, still requires intermittent cues. Increased subjective improvement reported by pt and continues with good HEP compliance. Pt still needs work on functional core stability, dynamic balance/stability, functional strength, UE/LE coordination & functional activity tolerance & will benefit from continued skilled PT services.  -KG     Rehab Potential Good  -KG     Patient/caregiver participated in establishment of treatment plan and goals Yes  -KG     Patient would benefit from skilled therapy intervention Yes  -KG            PT Plan    PT Frequency 1x/week  -KG     Predicted Duration of Therapy Intervention (PT) 5 visits  -KG     PT Plan Comments Continue incorporating LE/UE coordination activities. Continue seated pball/body blade. Could use body blade in half kneeling position. Try some outside gait in  "grass/hills/[place], etc for balance/stability if nice outside. Continue progressing overall dynamic stability and strengthening.  -KG           User Key  (r) = Recorded By, (t) = Taken By, (c) = Cosigned By    Initials Name Provider Type    GEETHA Jaja Browne, PT Physical Therapist                 OP Exercises     Row Name 04/15/22 0800             Subjective Comments    Subjective Comments Pt apologizes for missing last week. Reports it was a rough week. Had to go to the ER because he hit his head really hard on the side of his shunt. Everything was ok with the part of shunt in his ventricle but had to go to [place] for additional imaging because the part of the shunt in his chest has shifted completely from the R to the L side. May have to have surgery to move it back. It also may be why he is having his heart issues. States overall he feels like he is still making good gains with PT. Reports 45-50% overall improvement. States he still wants to work more on his balance, coordination, and some strength  -KG              Subjective Pain    Able to rate subjective pain? yes  -KG      Pre-Treatment Pain Level 4  -KG      Post-Treatment Pain Level 4  -KG              Exercise 1    Exercise Name 1 Pro II knee ROM/endurance  -KG      Additional Comments deferred due to time  -KG              Exercise 2    Exercise Name 2 Scalene stretch chago  -KG      Reps 2 2 ea  -KG      Time 2 30 sec hold  -KG              Exercise 3    Exercise Name 3 Reverse BOSU mini squats  -KG      Cueing 3 Verbal  -KG      Sets 3 1  -KG      Reps 3 15  -KG              Exercise 4    Exercise Name 4 Reverse BOSU static balance FA  -KG      Cueing 4 Verbal  -KG      Reps 4 1  -KG      Time 4 30\"  -KG      Additional Comments no rail touches  -KG              Exercise 5    Exercise Name 5 Reverse BOSU FA with multi-directional reaching to targets on wall outside of [place]  -KG      Cueing 5 Verbal  -KG      Time 5 ~2-3 min  -KG      Additional " "Comments close SBA  -KG              Exercise 6    Exercise Name 6 Dynamic gait index test  -KG              Exercise 7    Exercise Name 7 Seated physioball PN/TA 2H body blade at chest level  -KG      Cueing 7 Verbal;Tactile  -KG      Reps 7 2  -KG      Time 7 25\"  -KG              Exercise 8    Exercise Name 8 Seated physioball PN/TA 1H body blade in scaption at chest level  -KG      Cueing 8 Verbal;Tactile  -KG      Reps 8 1 ea UE  -KG      Time 8 20\"  -KG              Exercise 9    Exercise Name 9 CC Half kneeling pallof press  -KG      Cueing 9 Verbal  -KG      Sets 9 1  -KG      Reps 9 15 ea direction  -KG      Additional Comments 1 plate; kneeling on airex pad  -KG              Exercise 10    Exercise Name 10 CC resisted gait 4-way with BOSU step up  -KG      Cueing 10 Verbal  -KG      Sets 10 1  -KG      Reps 10 5 laps  -KG      Additional Comments 2 plate; BOSU step up - HHA at rail intermittently with step up  -KG              Exercise 11    Exercise Name 11 Balance course: BOSU, 1 tall/short/tall/short nitesh, unlevel mat, 1 tall/1short nitesh, wedges for incline  -KG      Cueing 11 Verbal  -KG      Sets 11 1  -KG      Reps 11 3 laps  -KG      Additional Comments holding weighted med ball  -KG            User Key  (r) = Recorded By, (t) = Taken By, (c) = Cosigned By    Initials Name Provider Type    KG Jaja Browne, PT Physical Therapist                       PT OP Goals     Row Name 04/15/22 0800          PT Short Term Goals    STG Date to Achieve 03/09/22  -KG     STG 1 Demonstrate independence/compliance in performance of initial HEP  -KG     STG 1 Progress Met  -KG     STG 2 Improved Dynamic Gait Index to 17/24 to demonstrate improved safey and dynamic balance with functional mobility  -KG     STG 2 Progress Met  -KG     STG 3 Demonstrate improved bilateral ankle DF/PF MMT to 5/5  -KG     STG 3 Progress Met  -KG     STG 4 Perform tandem gait on level ground for 20 ft with no LOB, UE assist, and " improve LE control  -KG     STG 4 Progress Met  -KG     STG 5 Demonstrate improved bilateral hip MMT to 4+/5 or greater  -KG     STG 5 Progress Met  -KG            Long Term Goals    LTG Date to Achieve 05/13/22  -KG     LTG 1 Subjectively report 50% overall improvement or greater  -KG     LTG 1 Progress Met  -KG     LTG 2 Improved Dynamic Gait Index to 19/24 to demonstrate improved safey and dynamic balance with functional mobility and decrease fall risk  -KG     LTG 2 Progress Met  -KG     LTG 3 Demonstrate improved bilateral hip MMT to 5/5 in all planes  -KG     LTG 3 Progress Partially Met;Progressing  -KG     LTG 4 Perform balance/obstacle course consisting of hurdles, BOSU, unlevel/compliant surface, & wedges for incline with no LOB, good overall LE control to demonstrate improved dynamic balance with navigation outside of home  -KG     LTG 4 Progress Met  -KG     LTG 5 Perform airex FA/EO balance with UE dual task activities (paloff press, rows, ball toss, etc) with improved LE control, no LOB  -KG     LTG 5 Progress Partially Met;Progressing  -KG     LTG 6 Demonstrate ability to lift/carry lightly weighted crate from floor>waist, good lifting mechanics, without LOB  -KG     LTG 6 Progress Met  -KG           User Key  (r) = Recorded By, (t) = Taken By, (c) = Cosigned By    Initials Name Provider Type    Jaja Desai, PT Physical Therapist                          Therapy Education  Given: HEP, Symptoms/condition management, Pain management, Posture/body mechanics, Fall prevention and home safety  Program: Reinforced  How Provided: Verbal, Demonstration, Written  Provided to: Patient  Level of Understanding: Teach back education performed, Verbalized, Demonstrated    Outcome Measure Options: Dynamic Gait Index, Dizziness Handicap Inventory (DHI: 68 = severe handicap)  Dynamic Gait Index (DGI)  Gait Level Surface: Normal: walks 20’, no assistive devices, good speed, no evidence for imbalance, normal  gait pattern  Change in Gait Speed: Normal: Able to smoothly change walking speed without loss of balance or gait deviation. Shows significant difference in walking speeds between normal, fast and slow paces.  Gait with Horizontal Head Turns: Mild impairment: Performs head turns smoothly with slight change in gait velocity, i.e. minor disruption to smooth gait path or uses walking aid.  Gait with Vertical Head Turns: Mild impairment: Performs head turns smoothly with slight change in gait velocity, i.e. minor disruption to smooth gait path or uses walking aid.  Gait and Pivot Turn: Mild impairment: pivot turns safely in >3 seconds and stops with no loss of balance.  Step Over Obstacle: Normal: Is able to step over box without changing gait speed, no evidence for imbalance.  Step Around Obstacles: Normal: Is able to walk safely around cones safely without changing gait speed, no evidence of imbalance.  Steps: Normal: Alternating feet, no rail.  Dynamic Gait Index Score: 21  Dynamic Gait Index Comments: </= 19/24: increased fall risk      Time Calculation:   Start Time: 0847  Stop Time: 0935  Time Calculation (min): 48 min     Charges:  PT Therapeutic procedure x2  PT Neuromuscular re-ed x1    PT G-Codes  Outcome Measure Options: Dynamic Gait Index, Dizziness Handicap Inventory (DHI: 68 = severe handicap)         Jaja Browne, PT  4/15/2022

## 2022-04-25 DIAGNOSIS — G93.5 CHIARI I MALFORMATION: Primary | ICD-10-CM

## 2022-04-26 ENCOUNTER — HOSPITAL ENCOUNTER (OUTPATIENT)
Dept: PHYSICAL THERAPY | Facility: HOSPITAL | Age: 27
Setting detail: THERAPIES SERIES
Discharge: HOME | End: 2022-04-26

## 2022-04-26 DIAGNOSIS — G91.9 HYDROCEPHALUS, UNSPECIFIED TYPE: Primary | ICD-10-CM

## 2022-04-26 DIAGNOSIS — R42 DIZZINESS: ICD-10-CM

## 2022-04-26 DIAGNOSIS — R26.89 POOR BALANCE: ICD-10-CM

## 2022-04-26 PROCEDURE — 97112 NEUROMUSCULAR REEDUCATION: CPT

## 2022-04-26 PROCEDURE — 97110 THERAPEUTIC EXERCISES: CPT

## 2022-04-26 NOTE — THERAPY TREATMENT NOTE
Outpatient Physical Therapy Ortho Treatment Note  [facility]     Patient Name: Curtis Garsia  : 1995  MRN: [id number]  Today's Date: 2022      Visit Date: 2022    Attendance: 13 visits  Subjective improvement: 45-50%  MD appt: PRN  Recheck due: 2022    Visit Dx:    ICD-10-CM ICD-9-CM   1. Hydrocephalus, unspecified type (HCC)  G91.9 331.4   2. Dizziness  R42 780.4   3. Poor balance  R26.89 781.99       Patient Active Problem List   Diagnosis   • Hydrocephalus (HCC)   • Palpitations   • S/P  shunt Dr Charly Li 2021   • JOSHI (dyspnea on exertion)   • Class 1 obesity due to excess calories with serious comorbidity and body mass index (BMI) of 32.0 to 32.9 in adult   • Elevated blood pressure reading in office without diagnosis of hypertension        Past Medical History:   Diagnosis Date   • Anxiety    • Asthma     AS A CHILD    • Balance disorder    • Chiari I malformation (HCC)    • Cholelithiasis         Past Surgical History:   Procedure Laterality Date   • NERVE DECOMPRESSION     • SHUNT REVISION          PT Ortho     Row Name 22 1300       Subjective Comments    Subjective Comments pt states he has had a better week. States that he has no pain today and feels decent  -KM       Precautions and Contraindications    Precautions Dizziness, R ventricle shunt placement, hx of chiari decompression  -KM       Subjective Pain    Able to rate subjective pain? yes  -KM    Pre-Treatment Pain Level 0  -KM    Post-Treatment Pain Level 0  -KM          User Key  (r) = Recorded By, (t) = Taken By, (c) = Cosigned By    Initials Name Provider Type    Yoselin Domínguez PTA Physical Therapist Assistant                             PT Assessment/Plan     Row Name 22 1300          PT Assessment    Assessment Comments pt did well with treatment today. Able to do harder intensity obstacle course, and challenged his dynamic balance with no balance  disturbances. Only one instance of dizziness but recovered quickly. No issues with outdoor surfaces.  -KM            PT Plan    PT Frequency 1x/week  -KM     PT Plan Comments Cont harder obstacle course. Possibly try some balance activities with EC  -KM           User Key  (r) = Recorded By, (t) = Taken By, (c) = Cosigned By    Initials Name Provider Type    Yoselin Domínguez, PTA Physical Therapist Assistant                   OP Exercises     Row Name 04/26/22 1300             Subjective Comments    Subjective Comments pt states he has had a better week. States that he has no pain today and feels decent  -KM              Subjective Pain    Able to rate subjective pain? yes  -KM      Pre-Treatment Pain Level 0  -KM      Post-Treatment Pain Level 0  -KM              Exercise 1    Exercise Name 1 PROII for strength/endurance  -KM      Time 1 8 min  -KM      Additional Comments L4.0  -KM              Exercise 2    Exercise Name 2 Scalene stretch chago  -KM      Reps 2 2  -KM      Time 2 30 sec hold  -KM              Exercise 3    Exercise Name 3 Reverse BOSU mini squats  -KM      Cueing 3 Verbal  -KM      Sets 3 2  -KM      Reps 3 10  -KM              Exercise 4    Exercise Name 4 Reverse BOSU static balance FA  -KM      Reps 4 2  -KM      Time 4 30 sec hold  -KM              Exercise 5    Exercise Name 5 Reverse BOSU FA with multi-directional reaching to targets on wall outside of [place]  -KM      Time 5 2 min  -KM              Exercise 6    Exercise Name 6 Reverse BOSU ball toss  -KM      Sets 6 1  -KM      Reps 6 15 tosses  -KM      Additional Comments CGA; gait belt  -KM              Exercise 7    Exercise Name 7 Airex beam side stepping + cone placement on rail  -KM      Sets 7 1  -KM      Reps 7 3 laps  -KM              Exercise 8    Exercise Name 8 Outdoor walk: jena/unlevel ground/curbs  -KM      Time 8 10 min  -KM              Exercise 9    Exercise Name 9 Outdoor walk: ascend/descend hill  -KM      Sets  9 1  -KM      Reps 9 1  -KM              Exercise 10    Exercise Name 10 Seated pball TA/PN + body blade2 H at chest level  -KM      Reps 10 2  -KM      Time 10 30 sec  -KM              Exercise 11    Exercise Name 11 Seated TA/PN + 1H body blade at 45°  -KM      Sets 11 1  -KM      Reps 11 30 sec ea UE  -KM              Exercise 12    Exercise Name 12 Half kneeling CC pallof press  -KM      Reps 12 1x15 ea direction  -KM      Additional Comments 1 pl  -KM              Exercise 13    Exercise Name 13 Half kneeling body blade @ chst level  -KM      Reps 13 2  -KM      Time 13 30 sec  -KM              Exercise 14    Exercise Name 14 Balance course: bosu, 3 tall hurdles, unlevel mat, wedges  -KM      Sets 14 1  -KM      Reps 14 3 laps  -KM      Additional Comments carrying med ball  -KM            User Key  (r) = Recorded By, (t) = Taken By, (c) = Cosigned By    Initials Name Provider Type    Yoselin Domínguez, PTA Physical Therapist Assistant                              PT OP Goals     Row Name 04/26/22 1300          PT Short Term Goals    STG Date to Achieve 03/09/22  -KM     STG 1 Demonstrate independence/compliance in performance of initial HEP  -KM     STG 1 Progress Met  -KM     STG 2 Improved Dynamic Gait Index to 17/24 to demonstrate improved safey and dynamic balance with functional mobility  -KM     STG 2 Progress Met  -KM     STG 3 Demonstrate improved bilateral ankle DF/PF MMT to 5/5  -KM     STG 3 Progress Met  -KM     STG 4 Perform tandem gait on level ground for 20 ft with no LOB, UE assist, and improve LE control  -KM     STG 4 Progress Met  -KM     STG 5 Demonstrate improved bilateral hip MMT to 4+/5 or greater  -KM     STG 5 Progress Met  -KM            Long Term Goals    LTG Date to Achieve 05/13/22  -KM     LTG 1 Subjectively report 50% overall improvement or greater  -KM     LTG 1 Progress Met  -KM     LTG 2 Improved Dynamic Gait Index to 19/24 to demonstrate improved safey and dynamic  balance with functional mobility and decrease fall risk  -KM     LTG 2 Progress Met  -KM     LTG 3 Demonstrate improved bilateral hip MMT to 5/5 in all planes  -KM     LTG 3 Progress Partially Met;Progressing  -KM     LTG 4 Perform balance/obstacle course consisting of hurdles, BOSU, unlevel/compliant surface, & wedges for incline with no LOB, good overall LE control to demonstrate improved dynamic balance with navigation outside of home  -KM     LTG 4 Progress Met  -KM     LTG 5 Perform airex FA/EO balance with UE dual task activities (paloff press, rows, ball toss, etc) with improved LE control, no LOB  -KM     LTG 5 Progress Partially Met;Progressing  -KM     LTG 6 Demonstrate ability to lift/carry lightly weighted crate from floor>waist, good lifting mechanics, without LOB  -KM     LTG 6 Progress Met  -KM            Time Calculation    PT Goal Re-Cert Due Date 05/06/22  -KM           User Key  (r) = Recorded By, (t) = Taken By, (c) = Cosigned By    Initials Name Provider Type     Yoselin Diaz PTA Physical Therapist Assistant                Therapy Education  Given: HEP, Symptoms/condition management, Pain management, Posture/body mechanics, Fall prevention and home safety  Program: Reinforced  How Provided: Verbal, Demonstration, Written  Provided to: Patient  Level of Understanding: Teach back education performed, Verbalized, Demonstrated              Time Calculation:   Start Time: 1350  Stop Time: 1450  Time Calculation (min): 60 min  Therapy Charges for Today     Code Description Service Date Service Provider Modifiers Qty    94726133016  PT THER PROC EA 15 MIN 4/26/2022 Yoselin Diaz PTA GP, CQ 2    [id number]  PT NEUROMUSC RE EDUCATION EA 15 MIN 4/26/2022 Yoselin Diaz PTA GP, CQ 2                    Yoselin Diaz PTA  4/26/2022

## 2022-04-27 ENCOUNTER — TELEMEDICINE (OUTPATIENT)
Dept: CARDIOLOGY | Facility: CLINIC | Age: 27
End: 2022-04-27

## 2022-04-27 VITALS
HEART RATE: 79 BPM | BODY MASS INDEX: 31.22 KG/M2 | WEIGHT: 206 LBS | DIASTOLIC BLOOD PRESSURE: 84 MMHG | HEIGHT: 68 IN | SYSTOLIC BLOOD PRESSURE: 129 MMHG

## 2022-04-27 DIAGNOSIS — R00.2 PALPITATIONS: Primary | ICD-10-CM

## 2022-04-27 DIAGNOSIS — G91.9 HYDROCEPHALUS, UNSPECIFIED TYPE: ICD-10-CM

## 2022-04-27 DIAGNOSIS — R06.09 DOE (DYSPNEA ON EXERTION): ICD-10-CM

## 2022-04-27 DIAGNOSIS — I10 PRIMARY HYPERTENSION: ICD-10-CM

## 2022-04-27 DIAGNOSIS — E66.09 CLASS 1 OBESITY DUE TO EXCESS CALORIES WITH SERIOUS COMORBIDITY AND BODY MASS INDEX (BMI) OF 32.0 TO 32.9 IN ADULT: ICD-10-CM

## 2022-04-27 PROCEDURE — 99214 OFFICE O/P EST MOD 30 MIN: CPT | Performed by: NURSE PRACTITIONER

## 2022-04-27 RX ORDER — LOSARTAN POTASSIUM 25 MG/1
25 TABLET ORAL DAILY
Qty: 30 TABLET | Refills: 11 | Status: SHIPPED | OUTPATIENT
Start: 2022-04-27 | End: 2022-08-31 | Stop reason: SDUPTHER

## 2022-04-27 NOTE — PROGRESS NOTES
Chief Complaint  Results (CTA) and Hypertension    Subjective          Curtis Yepez 's video visit today is for routine follow-up of elevated blood pressure and outpatient testing.  CT angiogram of the coronary arteries on 4/5/2022 revealed coronary calcium score of 0 with normal epicardial coronary arteries.  His blood pressure was elevated at his last office visit on 3/21/2022, however he did not have a history of hypertension as far as he knew.  He was instructed to monitor blood pressure. He reports blood pressures consistently higher than 130/90. He has hydrocephalus status post  shunt 5/21, Chiari malformation, anxiety and obesity.  He continues to complain of dyspnea with mild to moderate exertion and intermittent palpitations.  Patient denies chest pain, dizziness, syncope, orthopnea, PND, edema or decreased stamina.  Patient denies any signs of bleeding.    Palpitations   This is a recurrent problem. The current episode started more than 1 month ago. The problem occurs intermittently. The problem has been unchanged. Associated symptoms include anxiety and shortness of breath. Pertinent negatives include no chest fullness, chest pain, coughing, diaphoresis, dizziness, fever, irregular heartbeat, malaise/fatigue, nausea, near-syncope, numbness, syncope, vomiting or weakness. Risk factors include obesity. His past medical history is significant for anxiety.   Shortness of Breath  This is a recurrent problem. The current episode started more than 1 month ago. The problem occurs intermittently. Pertinent negatives include no abdominal pain, chest pain, claudication, coryza, ear pain, fever, headaches, hemoptysis, leg pain, leg swelling, neck pain, orthopnea, PND, rash, rhinorrhea, sore throat, sputum production, swollen glands, syncope, vomiting or wheezing. The symptoms are aggravated by any activity.   Hypertension  This is a recurrent problem. The current episode started 1 to 4 weeks ago. The problem  "is unchanged. The problem is uncontrolled. Associated symptoms include anxiety, palpitations and shortness of breath. Pertinent negatives include no blurred vision, chest pain, headaches, malaise/fatigue, neck pain, orthopnea, peripheral edema, PND or sweats. Risk factors for coronary artery disease include male gender and obesity. Past treatments include nothing. Current antihypertension treatment includes nothing. The current treatment provides no improvement. There are no compliance problems.            Objective   Vital Signs:   /84   Pulse 79   Ht 172.7 cm (68\")   Wt 93.4 kg (206 lb)   BMI 31.32 kg/m²     Physical Exam   Result Review :   The following data was reviewed by: FRANKY Christianson on 4/27/2022:  Common labs    Common Labsle 2/2/22 2/2/22 2/2/22 4/5/22 4/9/22    0941 0941 0941     Glucose  88      Glucose     95   BUN  16   12   Creatinine  0.94  0.76 0.86   eGFR Non  Am  96   >60   eGFR African Am  116   >60   Sodium  141   139   Potassium  4.5   4.2   Chloride  107   111 (A)   Calcium  9.2   8.7 (A)   Total Protein  7.1      Albumin  4.60   4.2   Total Bilirubin  0.2   <0.2 (A)   Alkaline Phosphatase  133 (A)   112   AST (SGOT)  16   16   ALT (SGPT)  19   16   WBC 7.21       Hemoglobin 12.2 (A)       Hematocrit 39.0       Platelets 418       Total Cholesterol   201 (A)     Triglycerides   106     HDL Cholesterol   52     LDL Cholesterol    130 (A)     (A) Abnormal value       Comments are available for some flowsheets but are not being displayed.           Data reviewed: Cardiology studies holter monitor 3/20/22 and 2d echo 3/4/22 and CT angiogram of the coronary arteries 4/5/2022.           Assessment and Plan    Diagnoses and all orders for this visit:    1. Palpitations (Primary)- no correlated arrhythmia on recent holter monitor.  Stable.    2. JOSHI (dyspnea on exertion)- no significant arrhythmia on recent holter monitor. No structural signs of heart failure on 2d echo.  " Normal CT angiogram of coronary arteries 4/5/2022.  Recommend further work-up for noncardiac causes of shortness of breath per PCP.    3. Hydrocephalus, unspecified type (HCC)- s/p  shunt 5/21.  Stable.    4. Class 1 obesity due to excess calories with serious comorbidity and body mass index (BMI) of 32.0 to 32.9 in adult- Patient's Body mass index is 31.32 kg/m². indicating that he is obese (BMI >30). Obesity-related health conditions include the following: none. Obesity is unchanged. BMI is is above average; no BMI management plan is appropriate. We discussed low calorie, low carb based diet program, portion control and increasing exercise.    5. Primary hypertension-  New diagnosis. Blood pressures have been consistently higher than 130/90 at home. Start losartan 25 mg daily. Monitor and record daily blood pressure. Report readings consistently higher than 130/90 or consistently lower than 100/60.     You have chosen to receive care through a telehealth visit.  Do you consent to use a video/audio connection for your medical care today? Yes      Follow Up   No follow-ups on file.  Patient was given instructions and counseling regarding his condition or for health maintenance advice. Please see specific information pulled into the AVS if appropriate.

## 2022-04-29 ENCOUNTER — OFFICE VISIT (OUTPATIENT)
Dept: FAMILY MEDICINE CLINIC | Facility: CLINIC | Age: 27
End: 2022-04-29

## 2022-04-29 VITALS
HEART RATE: 77 BPM | OXYGEN SATURATION: 98 % | SYSTOLIC BLOOD PRESSURE: 112 MMHG | BODY MASS INDEX: 31.67 KG/M2 | DIASTOLIC BLOOD PRESSURE: 77 MMHG | HEIGHT: 68 IN | WEIGHT: 209 LBS | TEMPERATURE: 98 F

## 2022-04-29 DIAGNOSIS — M54.6 CHRONIC MIDLINE THORACIC BACK PAIN: Primary | ICD-10-CM

## 2022-04-29 DIAGNOSIS — G95.0 SYRINX OF SPINAL CORD: ICD-10-CM

## 2022-04-29 DIAGNOSIS — M54.2 CERVICAL PAIN: ICD-10-CM

## 2022-04-29 DIAGNOSIS — G89.29 CHRONIC MIDLINE THORACIC BACK PAIN: Primary | ICD-10-CM

## 2022-04-29 PROCEDURE — 99213 OFFICE O/P EST LOW 20 MIN: CPT | Performed by: NURSE PRACTITIONER

## 2022-04-29 NOTE — PROGRESS NOTES
"CC: follow up vomitting    History:  Curtis Garsia is a 26 y.o. male who presents today for evaluation of the above problems.      Patient states that his daily vomiting has drastically reduced since starting protonix one month ago.  Is very happy with treatment.   However, for the past 2-3 months he has had increased neck and back pain. Pain is worse at the end of the day. Increased numbness and tingling in hands and arms and feet. Frequently drops items. Has been doing  exercises at home and this has been helping. Has tremors when pain is worse. 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  Can not have MRI locally due to shunt.   Has appointment with neurosurgeon May 16th.       HPI  ROS:  Review of Systems   Constitutional: Negative for fever.   Musculoskeletal: Positive for back pain and neck pain.   Neurological: Positive for tremors and weakness.       No Known Allergies  Past Medical History:   Diagnosis Date   • Anxiety    • Asthma     AS A CHILD    • Balance disorder    • Chiari I malformation (HCC)    • Cholelithiasis      Past Surgical History:   Procedure Laterality Date   • NERVE DECOMPRESSION  2011   • SHUNT REVISION       Family History   Problem Relation Age of Onset   • Chiari malformation Mother    • Stroke Mother    • Heart disease Mother    • ADD / ADHD Father    • Heart disease Father    • Heart disease Brother    • Thyroid disease Maternal Aunt    • Heart disease Maternal Aunt    • Hyperlipidemia Maternal Uncle    • No Known Problems Paternal Aunt    • ADD / ADHD Paternal Uncle    • Heart disease Paternal Uncle    • Diabetes Paternal Uncle    • No Known Problems Maternal Grandmother    • Heart disease Maternal Grandfather    • Diabetes Paternal Grandmother    • Heart disease Paternal Grandfather       reports that he has never smoked. He has never used smokeless tobacco. He reports previous alcohol use. " "He reports that he does not use drugs.      Current Outpatient Medications:   •  Butalbital-APAP-Caff-Cod -96-30 MG capsule, Every 6 (Six) Hours., Disp: , Rfl:   •  clonazePAM (KlonoPIN) 1 MG tablet, Take 1 mg by mouth Daily., Disp: , Rfl:   •  DULoxetine (CYMBALTA) 60 MG capsule, Take 1 capsule by mouth Daily., Disp: 90 capsule, Rfl: 3  •  gabapentin (NEURONTIN) 300 MG capsule, Take 300 mg by mouth 3 (Three) Times a Day., Disp: , Rfl:   •  losartan (COZAAR) 25 MG tablet, Take 1 tablet by mouth Daily., Disp: 30 tablet, Rfl: 11  •  ondansetron (ZOFRAN) 4 MG tablet, Take 4 mg by mouth Every 6 (Six) Hours As Needed for Nausea., Disp: , Rfl:   •  pantoprazole (Protonix) 40 MG EC tablet, Take 1 tablet by mouth Daily., Disp: 30 tablet, Rfl: 0  •  topiramate (TOPAMAX) 100 MG tablet, Take 125 mg by mouth 2 (Two) Times a Day., Disp: , Rfl:   •  traZODone (DESYREL) 50 MG tablet, Take 1-2 tabs nightly for sleep., Disp: 60 tablet, Rfl: 0    OBJECTIVE:  /77 (BP Location: Left arm, Patient Position: Sitting, Cuff Size: Large Adult)   Pulse 77   Temp 98 °F (36.7 °C) (Temporal)   Ht 172.7 cm (68\")   Wt 94.8 kg (209 lb)   SpO2 98%   BMI 31.78 kg/m²    Physical Exam  Vitals reviewed.   Constitutional:       Appearance: He is well-developed.   Cardiovascular:      Rate and Rhythm: Normal rate.   Pulmonary:      Effort: Pulmonary effort is normal.   Neurological:      Mental Status: He is alert and oriented to person, place, and time.      Comments:  strength symmetrical 5/5  Push and pull strength symmetrical 5/5   Psychiatric:         Behavior: Behavior normal.         Assessment/Plan    Diagnoses and all orders for this visit:    1. Chronic midline thoracic back pain (Primary)  -     CT Thoracic Spine With & Without Contrast; Future    2. Cervical pain  -     CT Cervical Spine Without Contrast; Future    3. Syrinx of spinal cord (HCC)  -     CT Thoracic Spine With & Without Contrast; Future  -     CT Cervical " Spine Without Contrast; Future        An After Visit Summary was printed and given to the patient at discharge.  Return if symptoms worsen or fail to improve, for Next scheduled follow up.       Delilah Kulkarni, FRANKY 4/29/22    Electronically signed.

## 2022-05-02 ENCOUNTER — OFFICE VISIT (OUTPATIENT)
Dept: FAMILY MEDICINE CLINIC | Facility: CLINIC | Age: 27
End: 2022-05-02

## 2022-05-02 VITALS
BODY MASS INDEX: 32.74 KG/M2 | OXYGEN SATURATION: 98 % | DIASTOLIC BLOOD PRESSURE: 75 MMHG | TEMPERATURE: 98.2 F | WEIGHT: 216 LBS | SYSTOLIC BLOOD PRESSURE: 121 MMHG | HEIGHT: 68 IN | HEART RATE: 78 BPM

## 2022-05-02 DIAGNOSIS — M54.2 CERVICALGIA: Primary | ICD-10-CM

## 2022-05-02 DIAGNOSIS — K21.9 GASTROESOPHAGEAL REFLUX DISEASE, UNSPECIFIED WHETHER ESOPHAGITIS PRESENT: ICD-10-CM

## 2022-05-02 PROCEDURE — 99213 OFFICE O/P EST LOW 20 MIN: CPT | Performed by: NURSE PRACTITIONER

## 2022-05-02 PROCEDURE — 96372 THER/PROPH/DIAG INJ SC/IM: CPT | Performed by: NURSE PRACTITIONER

## 2022-05-02 RX ORDER — PREDNISONE 10 MG/1
TABLET ORAL
Qty: 21 TABLET | Refills: 0 | Status: SHIPPED | OUTPATIENT
Start: 2022-05-02 | End: 2022-08-04

## 2022-05-02 RX ORDER — TIZANIDINE 4 MG/1
4 TABLET ORAL NIGHTLY PRN
Qty: 30 TABLET | Refills: 0 | Status: SHIPPED | OUTPATIENT
Start: 2022-05-02 | End: 2022-06-07 | Stop reason: SDUPTHER

## 2022-05-02 RX ORDER — PANTOPRAZOLE SODIUM 40 MG/1
40 TABLET, DELAYED RELEASE ORAL DAILY
Qty: 30 TABLET | Refills: 0 | Status: SHIPPED | OUTPATIENT
Start: 2022-05-02 | End: 2022-05-02 | Stop reason: SDUPTHER

## 2022-05-02 RX ORDER — TRIAMCINOLONE ACETONIDE 40 MG/ML
40 INJECTION, SUSPENSION INTRA-ARTICULAR; INTRAMUSCULAR ONCE
Status: COMPLETED | OUTPATIENT
Start: 2022-05-02 | End: 2022-05-02

## 2022-05-02 RX ORDER — PANTOPRAZOLE SODIUM 40 MG/1
40 TABLET, DELAYED RELEASE ORAL DAILY
Qty: 30 TABLET | Refills: 2 | Status: SHIPPED | OUTPATIENT
Start: 2022-05-02 | End: 2022-06-07 | Stop reason: SDUPTHER

## 2022-05-02 RX ADMIN — TRIAMCINOLONE ACETONIDE 40 MG: 40 INJECTION, SUSPENSION INTRA-ARTICULAR; INTRAMUSCULAR at 08:50

## 2022-05-02 NOTE — PROGRESS NOTES
CC: neck pain    History:  Curtis Garsia is a 26 y.o. male who presents today for evaluation of the above problems.     Chronic neck pain for several years, probably related to Chiari, But notes it has been worse over the weekend.   Has had increased activity for the last few days and helped install window AC units in his home.  The right side of his neck has felt swollen and is sore to the touch.    States that he has taken oxycodone in the past and that he did not like the way it made him feel.      HPI  ROS:  Review of Systems   Musculoskeletal: Positive for neck pain.       No Known Allergies  Past Medical History:   Diagnosis Date   • Anxiety    • Asthma     AS A CHILD    • Balance disorder    • Chiari I malformation (HCC)    • Cholelithiasis      Past Surgical History:   Procedure Laterality Date   • NERVE DECOMPRESSION  2011   • SHUNT REVISION       Family History   Problem Relation Age of Onset   • Chiari malformation Mother    • Stroke Mother    • Heart disease Mother    • ADD / ADHD Father    • Heart disease Father    • Heart disease Brother    • Thyroid disease Maternal Aunt    • Heart disease Maternal Aunt    • Hyperlipidemia Maternal Uncle    • No Known Problems Paternal Aunt    • ADD / ADHD Paternal Uncle    • Heart disease Paternal Uncle    • Diabetes Paternal Uncle    • No Known Problems Maternal Grandmother    • Heart disease Maternal Grandfather    • Diabetes Paternal Grandmother    • Heart disease Paternal Grandfather       reports that he has never smoked. He has never used smokeless tobacco. He reports previous alcohol use. He reports that he does not use drugs.      Current Outpatient Medications:   •  Butalbital-APAP-Caff-Cod -91-30 MG capsule, Every 6 (Six) Hours., Disp: , Rfl:   •  clonazePAM (KlonoPIN) 1 MG tablet, Take 1 mg by mouth Daily., Disp: , Rfl:   •  DULoxetine (CYMBALTA) 60 MG capsule, Take 1 capsule by mouth Daily., Disp: 90 capsule, Rfl: 3  •  gabapentin (NEURONTIN)  "300 MG capsule, Take 300 mg by mouth 3 (Three) Times a Day., Disp: , Rfl:   •  losartan (COZAAR) 25 MG tablet, Take 1 tablet by mouth Daily., Disp: 30 tablet, Rfl: 11  •  ondansetron (ZOFRAN) 4 MG tablet, Take 4 mg by mouth Every 6 (Six) Hours As Needed for Nausea., Disp: , Rfl:   •  pantoprazole (Protonix) 40 MG EC tablet, Take 1 tablet by mouth Daily., Disp: 30 tablet, Rfl: 2  •  topiramate (TOPAMAX) 100 MG tablet, Take 125 mg by mouth 2 (Two) Times a Day., Disp: , Rfl:   •  traZODone (DESYREL) 50 MG tablet, Take 1-2 tabs nightly for sleep., Disp: 60 tablet, Rfl: 0  •  diclofenac (VOLTAREN) 50 MG EC tablet, Take 1 tablet by mouth 2 (Two) Times a Day As Needed (neck and back pain). Take with food., Disp: 30 tablet, Rfl: 0  •  predniSONE (DELTASONE) 10 MG (21) dose pack, Use as directed on package, Disp: 21 tablet, Rfl: 0  •  tiZANidine (ZANAFLEX) 4 MG tablet, Take 1 tablet by mouth At Night As Needed for Muscle Spasms., Disp: 30 tablet, Rfl: 0    Current Facility-Administered Medications:   •  triamcinolone acetonide (KENALOG-40) injection 40 mg, 40 mg, Intramuscular, Once, Delilah Kulkarni, APRN    OBJECTIVE:  /75 (BP Location: Left arm, Patient Position: Sitting, Cuff Size: Large Adult)   Pulse 78   Temp 98.2 °F (36.8 °C) (Temporal)   Ht 172.7 cm (68\")   Wt 98 kg (216 lb)   SpO2 98%   BMI 32.84 kg/m²    Physical Exam  Vitals reviewed.   Constitutional:       Appearance: He is well-developed.   Cardiovascular:      Rate and Rhythm: Normal rate.   Pulmonary:      Effort: Pulmonary effort is normal.   Neurological:      Mental Status: He is alert and oriented to person, place, and time.      Comments: Tenderness noted in the muscles of the neck. No decreased sensation in arms or legs. No decreased strength in hands or arms. No decreased strength in hip flexors or knees.    Psychiatric:         Behavior: Behavior normal.         Assessment/Plan    Diagnoses and all orders for this visit:    1. Cervicalgia " (Primary)  -     triamcinolone acetonide (KENALOG-40) injection 40 mg  -     tiZANidine (ZANAFLEX) 4 MG tablet; Take 1 tablet by mouth At Night As Needed for Muscle Spasms.  Dispense: 30 tablet; Refill: 0  -     diclofenac (VOLTAREN) 50 MG EC tablet; Take 1 tablet by mouth 2 (Two) Times a Day As Needed (neck and back pain). Take with food.  Dispense: 30 tablet; Refill: 0  -     predniSONE (DELTASONE) 10 MG (21) dose pack; Use as directed on package  Dispense: 21 tablet; Refill: 0    2. Gastroesophageal reflux disease, unspecified whether esophagitis present  -     Discontinue: pantoprazole (Protonix) 40 MG EC tablet; Take 1 tablet by mouth Daily.  Dispense: 30 tablet; Refill: 0  -     pantoprazole (Protonix) 40 MG EC tablet; Take 1 tablet by mouth Daily.  Dispense: 30 tablet; Refill: 2        An After Visit Summary was printed and given to the patient at discharge.  Return if symptoms worsen or fail to improve, for Next scheduled follow up.       FRANKY Fitzgerald 5/2/22    Electronically signed.

## 2022-05-05 ENCOUNTER — APPOINTMENT (OUTPATIENT)
Dept: PHYSICAL THERAPY | Facility: HOSPITAL | Age: 27
End: 2022-05-05

## 2022-05-10 ENCOUNTER — HOSPITAL ENCOUNTER (OUTPATIENT)
Dept: PHYSICAL THERAPY | Facility: HOSPITAL | Age: 27
Setting detail: THERAPIES SERIES
Discharge: HOME | End: 2022-05-10

## 2022-05-10 DIAGNOSIS — R26.89 POOR BALANCE: ICD-10-CM

## 2022-05-10 DIAGNOSIS — R42 DIZZINESS: ICD-10-CM

## 2022-05-10 DIAGNOSIS — G91.9 HYDROCEPHALUS, UNSPECIFIED TYPE: Primary | ICD-10-CM

## 2022-05-10 PROCEDURE — 97112 NEUROMUSCULAR REEDUCATION: CPT | Performed by: PHYSICAL THERAPIST

## 2022-05-10 PROCEDURE — 97110 THERAPEUTIC EXERCISES: CPT | Performed by: PHYSICAL THERAPIST

## 2022-05-11 NOTE — THERAPY PROGRESS REPORT/RE-CERT
Outpatient Physical Therapy Ortho Progress Note  [facility]     Patient Name: Curtis Garsia  : 1995  MRN: [id number]  Today's Date: 2022      Visit Date: 05/10/2022     Attendance: 14 visits  Subjective improvement: 50-55%  MD appt: PRN; neurologist/specialist 2022  Recheck due: 2022    Visit Dx:    ICD-10-CM ICD-9-CM   1. Hydrocephalus, unspecified type (HCC)  G91.9 331.4   2. Dizziness  R42 780.4   3. Poor balance  R26.89 781.99       Patient Active Problem List   Diagnosis   • Hydrocephalus (HCC)   • Palpitations   • S/P  shunt Dr Charly Li 2021   • JOSHI (dyspnea on exertion)   • Class 1 obesity due to excess calories with serious comorbidity and body mass index (BMI) of 32.0 to 32.9 in adult   • Primary hypertension        Past Medical History:   Diagnosis Date   • Anxiety    • Asthma     AS A CHILD    • Balance disorder    • Chiari I malformation (HCC)    • Cholelithiasis         Past Surgical History:   Procedure Laterality Date   • NERVE DECOMPRESSION     • SHUNT REVISION          PT Ortho     Row Name 05/10/22 1400       Precautions and Contraindications    Precautions Dizziness, R ventricle shunt placement, hx of chiari decompression  -KG       Posture/Observations    Posture/Observations Comments Improving postural awareness. Intermittent postural cues needed throughout treatment but self-correcting frequently. Rounded/fwd shoulders, matthew when seated.  -KG       Cervical Palpation    Suboccipital Bilateral:;Tender;Guarded/taut  -KG    Scalenes Bilateral:;Tender;Guarded/taut  -KG    Levator Scapula Bilateral:;Tender;Guarded/taut  -KG    Upper Traps Bilateral:;Tender;Guarded/taut  -KG       General ROM    GENERAL ROM COMMENTS Bilateral UE/LE AROM WFL. Some very mild limtations in cervical mobiltiy, more so with rotation; WFL  -KG       MMT Right Upper Ext    Rt Shoulder Flexion MMT, Gross Movement (5/5) normal  -KG    Rt Shoulder ABduction MMT,  Gross Movement (5/5) normal  -KG    Rt Elbow Flexion MMT, Gross Movement: (5/5) normal  -KG    Rt Elbow Extension MMT, Gross Movement: (5/5) normal  -KG       MMT Left Upper Ext    Lt Shoulder Flexion MMT, Gross Movement (5/5) normal  -KG    Lt Shoulder ABduction MMT, Gross Movement (5/5) normal  -KG    Lt Elbow Flexion MMT, Gross Movement (5/5) normal  -KG    Lt Elbow Extension MMT, Gross Movement (5/5) normal  -KG       MMT Right Lower Ext    Rt Hip Flexion MMT, Gross Movement (5/5) normal  -KG    Rt Hip ABduction MMT, Gross Movement (4+/5) good plus  -KG    Rt Hip ADduction MMT, Gross Movement (5/5) normal  -KG    Rt Knee Extension MMT, Gross Movement (5/5) normal  -KG    Rt Knee Flexion MMT, Gross Movement (5/5) normal  -KG    Rt Ankle Plantarflexion MMT, Gross Movement (5/5) normal  -KG    Rt Ankle Dorsiflexion MMT, Gross Movement (5/5) normal  -KG       MMT Left Lower Ext    Lt Hip Flexion MMT, Gross Movement (5/5) normal  -KG    Lt Hip ABduction MMT, Gross Movement (4+/5) good plus  -KG    Lt Hip ADduction MMT, Gross Movement (5/5) normal  -KG    Lt Knee Extension MMT, Gross Movement (5/5) normal  -KG    Lt Knee Flexion MMT, Gross Movement (5/5) normal  -KG    Lt Ankle Plantarflexion MMT, Gross Movement (5/5) normal  -KG    Lt Ankle Dorsiflexion MMT, Gross Movement (5/5) normal  -KG       Sensation    Sensation WNL? WFL  -KG    Light Touch No apparent deficits  -KG       Upper Extremity Flexibility    Scalenes Bilateral:;Mildly limited  -KG    Upper Trapezius Bilateral:;Mildly limited  -KG    Pect Minor Bilateral:;Mildly limited  -KG    Pect Major Bilateral:;Mildly limited  -KG       Lower Extremity Flexibility    Hamstrings Bilateral:;Moderately limited  -KG    Hip Flexors Bilateral:;Mildly limited  -KG       Balance Skills Training    Balance Comments Pt able to perform static balance FT/EO on reverse BOSU without assist but with increased focus. With eyes closed pt quite a bit more challenged and requires  CGA at times. Decreased control at BLE’s and tends to lean/shift to the right. More challenging with reverse BOSU ball toss & dynamic reaching/touching wall on reverse BOSU, both ipsilateral and reaching across midline but was able to do without LOB. Pt has to really focus on multiple inputs/tasks when performing dual task activities, which was affecting his stability, vision, and LE control. Level ground: L & R SLS 30” with no UE touches and improving control. Airex single balance requires multiple hand touches. Needs intermittent hand touches at rail for single leg balance with opp LE touches to cups on floor (modified star drill) but can complete ~3 touches before needing UE assist. See functional outcome measures for dynamic gait index score.  -KG       Gait/Stairs (Locomotion)    Comment, (Gait/Stairs) Non-antalgic gait with no AD. Some mild toe out bilaterally  -KG          User Key  (r) = Recorded By, (t) = Taken By, (c) = Cosigned By    Initials Name Provider Type    Jaja Desai, PT Physical Therapist                             PT Assessment/Plan     Row Name 05/11/22 0800          PT Assessment    Functional Limitations Decreased safety during functional activities;Impaired gait;Limitation in home management;Limitations in community activities;Performance in leisure activities;Performance in self-care ADL;Limitations in functional capacity and performance  -KG     Impairments Balance;Gait;Impaired muscle endurance;Impaired flexibility;Muscle strength;Pain;Poor body mechanics;Posture;Range of motion  -KG     Assessment Comments Pt continues to make good steady progress with PT. Has had issues over the past few weeks in relation to his shunt and shifting of the distal portion. Had to go the ED over the weekend due to pain, vision, and dizziness issues. Sees a specialist on 5/16 in regard to this issue. Overall pt is making good gains with PT but still has good days & bad days due to issues stated  above. Dual task activities continue to be difficult as pt has to really focus on managing multiple inputs in order to maintain good balance & stability. Did better with multi-directional ball toss while on reverse BOSU; pt has more control and quicker response time but still experiences dizzy symptoms. Activities with eyes closed are also challenging but improving. Notable improvements in single leg balance and was able to add dynamic component this date with cues. Pt overall is more aware of his posture/body mechanics with therex, still requires intermittent cues. Increased subjective improvement reported by pt and continues with good HEP compliance. Pt still needs work on functional core stability, dynamic balance/stability, functional strength, UE/LE coordination & functional activity tolerance & will benefit from continued skilled PT services. Will plan to work towards indep management over the next 4 visits.  -KG     Rehab Potential Good  -KG     Patient/caregiver participated in establishment of treatment plan and goals Yes  -KG     Patient would benefit from skilled therapy intervention Yes  -KG            PT Plan    PT Frequency 1x/week  -KG     Predicted Duration of Therapy Intervention (PT) 4 more visits  -KG     PT Plan Comments Will work towards independent mangaement over the next 4 visits. Continue to challenge coordination and dual task activities with dynamic balance. Continue eyes closed balance activities and higher level balance/stability.  -KG           User Key  (r) = Recorded By, (t) = Taken By, (c) = Cosigned By    Initials Name Provider Type    Jaja Desai, PT Physical Therapist                   OP Exercises     Row Name 05/10/22 1400             Subjective Comments    Subjective Comments Pt states he had to go the ED on Friday. He was having pain in his whole body, vision was crossing/couldn't focus, was nauseous. States they gave hime Dilaudid to reset things. Sunday he finally  "started to feel better and is feel good today still. Goes to see a specialist/neurologist in [facility] on 5/16/2022. They think all of his problems are caused by the distal shunt issues. Still feels like he's making gains with PT. Does his HEP at home but states coming here helps too.  -KG              Subjective Pain    Able to rate subjective pain? yes  -KG      Pre-Treatment Pain Level 0  -KG      Post-Treatment Pain Level 0  -KG              Exercise 1    Exercise Name 1 Scalene stretch chago  -KG      Reps 1 2  -KG      Time 1 30\" hold  -KG              Exercise 2    Exercise Name 2 Reverse BOSU static balance FT  -KG      Cueing 2 Verbal  -KG      Reps 2 1  -KG      Time 2 ~45\"  -KG              Exercise 3    Exercise Name 3 Reverse BOSU static balance FT with eyes closed  -KG      Cueing 3 Verbal;Tactile  -KG      Reps 3 2  -KG      Time 3 30\"  -KG      Additional Comments intermittent CGA & hand touches at rail  -KG              Exercise 4    Exercise Name 4 Reverse BOSU FT with multi-directional reaching to targets on wall outside of MAR[place]A  -KG      Cueing 4 Verbal  -KG      Time 4 ~2 min  -KG              Exercise 5    Exercise Name 5 Reverse BOSU FA ball toss to PTA  -KG      Cueing 5 Verbal  -KG      Sets 5 1  -KG      Reps 5 15 tosses  -KG      Additional Comments CGA; gait belt  -KG              Exercise 6    Exercise Name 6 BLE/BUE strength measurements  -KG              Exercise 7    Exercise Name 7 Half kneeling tband pallof press for stability  -KG      Cueing 7 Verbal  -KG      Sets 7 1  -KG      Reps 7 15 ea direction; ea LE lead  -KG      Additional Comments green tband  -KG              Exercise 8    Exercise Name 8 SLS level ground  -KG      Reps 8 1  -KG      Time 8 30\"  -KG      Additional Comments no hand touches at rail  -KG              Exercise 9    Exercise Name 9 Airex L & R SLS  -KG      Cueing 9 Verbal  -KG      Reps 9 1 ea  -KG      Time 9 30\"  -KG      Additional Comments intermittent " hand touches at rail  -KG              Exercise 10    Exercise Name 10 L & R SLS with opp foot touches to 3 cups on floor  -KG      Cueing 10 Verbal  -KG      Sets 10 1  -KG      Reps 10 3 laps ea LE  -KG      Additional Comments Left, center/front, right (triangle shape)  -KG            User Key  (r) = Recorded By, (t) = Taken By, (c) = Cosigned By    Initials Name Provider Type    KG Jaja Browne, PT Physical Therapist                              PT OP Goals     Row Name 05/10/22 1400          PT Short Term Goals    STG Date to Achieve --  -KG     STG 1 Demonstrate independence/compliance in performance of initial HEP  -KG     STG 1 Progress Met  -KG     STG 2 Improved Dynamic Gait Index to 17/24 to demonstrate improved safey and dynamic balance with functional mobility  -KG     STG 2 Progress Met  -KG     STG 3 Demonstrate improved bilateral ankle DF/PF MMT to 5/5  -KG     STG 3 Progress Met  -KG     STG 4 Perform tandem gait on level ground for 20 ft with no LOB, UE assist, and improve LE control  -KG     STG 4 Progress Met  -KG     STG 5 Demonstrate improved bilateral hip MMT to 4+/5 or greater  -KG     STG 5 Progress Met  -KG            Long Term Goals    LTG Date to Achieve 05/31/22  -KG     LTG 1 Subjectively report 50% overall improvement or greater  -KG     LTG 1 Progress Met  -KG     LTG 2 Improved Dynamic Gait Index to 19/24 to demonstrate improved safey and dynamic balance with functional mobility and decrease fall risk  -KG     LTG 2 Progress Met  -KG     LTG 3 Demonstrate improved bilateral hip MMT to 5/5 in all planes  -KG     LTG 3 Progress Partially Met;Progressing  -KG     LTG 4 Perform balance/obstacle course consisting of hurdles, BOSU, unlevel/compliant surface, & wedges for incline with no LOB, good overall LE control to demonstrate improved dynamic balance with navigation outside of home  -KG     LTG 4 Progress Met  -KG     LTG 5 Perform reverse BOSU FA balance with UE dual task  activities (med ball raises, ball toss, etc) with improved LE control, no LOB to facilitate processing of multiple inputs for improved dynamic balance with functional activities  -KG     LTG 5 Progress Goal Revised;Progressing  -KG     LTG 6 Demonstrate ability to lift/carry lightly weighted crate from floor>waist, good lifting mechanics, without LOB  -KG     LTG 6 Progress Met  -KG     LTG 7 Perform single leg balance bilaterally on level ground performing 1x10 ball tosses with </= 2 opp LE touches on ground for balance  -KG     LTG 7 Progress New  -KG     LTG 8 Demonstrate independence/compliance in final HEP for continued improvements in dynamic balance, functional strength, & stability  -KG     LTG 8 Progress New  -KG            Time Calculation    PT Goal Re-Cert Due Date 05/31/22  -KG           User Key  (r) = Recorded By, (t) = Taken By, (c) = Cosigned By    Initials Name Provider Type    Jaja Desai, PT Physical Therapist                Therapy Education  Given: HEP, Symptoms/condition management, Pain management, Posture/body mechanics, Fall prevention and home safety  Program: Reinforced  How Provided: Verbal, Demonstration, Written  Provided to: Patient  Level of Understanding: Teach back education performed, Verbalized, Demonstrated    Outcome Measure Options: Dynamic Gait Index, Dizziness Handicap Inventory  Dynamic Gait Index (DGI)  Gait Level Surface: Normal: walks 20’, no assistive devices, good speed, no evidence for imbalance, normal gait pattern  Change in Gait Speed: Normal: Able to smoothly change walking speed without loss of balance or gait deviation. Shows significant difference in walking speeds between normal, fast and slow paces.  Gait with Horizontal Head Turns: Mild impairment: Performs head turns smoothly with slight change in gait velocity, i.e. minor disruption to smooth gait path or uses walking aid.  Gait with Vertical Head Turns: Mild impairment: Performs head turns  smoothly with slight change in gait velocity, i.e. minor disruption to smooth gait path or uses walking aid.  Gait and Pivot Turn: Normal: Pivot turns safely within 3 seconds and stops quickly with no loss of balance.  Step Over Obstacle: Normal: Is able to step over box without changing gait speed, no evidence for imbalance.  Step Around Obstacles: Normal: Is able to walk safely around cones safely without changing gait speed, no evidence of imbalance.  Steps: Normal: Alternating feet, no rail.  Dynamic Gait Index Score: 22  Dynamic Gait Index Comments: </= 19/24: increased fall risk      Time Calculation:   Start Time: 1435  Stop Time: 1515  Time Calculation (min): 40 min  Therapy Charges for Today     Code Description Service Date Service Provider Modifiers Qty    81165448554 HC PT THER PROC EA 15 MIN 5/10/2022 Jaja Browne, PT GP 2    55574762720 HC PT NEUROMUSC RE EDUCATION EA 15 MIN 5/10/2022 Jaja Browne, PT GP 1          PT G-Codes  Outcome Measure Options: Dynamic Gait Index, Dizziness Handicap Inventory         Jaja Browne, PT  5/10/2022

## 2022-05-19 ENCOUNTER — APPOINTMENT (OUTPATIENT)
Dept: PHYSICAL THERAPY | Facility: HOSPITAL | Age: 27
End: 2022-05-19

## 2022-06-07 DIAGNOSIS — K21.9 GASTROESOPHAGEAL REFLUX DISEASE, UNSPECIFIED WHETHER ESOPHAGITIS PRESENT: ICD-10-CM

## 2022-06-07 DIAGNOSIS — F41.9 ANXIETY: Primary | ICD-10-CM

## 2022-06-07 DIAGNOSIS — M54.2 CERVICALGIA: ICD-10-CM

## 2022-06-08 RX ORDER — TIZANIDINE 4 MG/1
4 TABLET ORAL NIGHTLY PRN
Qty: 30 TABLET | Refills: 2 | Status: SHIPPED | OUTPATIENT
Start: 2022-06-08 | End: 2022-08-04 | Stop reason: SDUPTHER

## 2022-06-08 RX ORDER — CLONAZEPAM 1 MG/1
1 TABLET ORAL DAILY
Qty: 30 TABLET | Refills: 0 | Status: SHIPPED | OUTPATIENT
Start: 2022-06-08 | End: 2022-07-07 | Stop reason: SDUPTHER

## 2022-06-08 RX ORDER — DULOXETIN HYDROCHLORIDE 60 MG/1
60 CAPSULE, DELAYED RELEASE ORAL DAILY
Qty: 90 CAPSULE | Refills: 3 | Status: SHIPPED | OUTPATIENT
Start: 2022-06-08 | End: 2022-11-04 | Stop reason: SDUPTHER

## 2022-06-08 RX ORDER — PANTOPRAZOLE SODIUM 40 MG/1
40 TABLET, DELAYED RELEASE ORAL DAILY
Qty: 30 TABLET | Refills: 2 | Status: SHIPPED | OUTPATIENT
Start: 2022-06-08 | End: 2022-10-05 | Stop reason: SDUPTHER

## 2022-06-08 NOTE — TELEPHONE ENCOUNTER
Rx Refill Note  Requested Prescriptions     Pending Prescriptions Disp Refills   • DULoxetine (CYMBALTA) 60 MG capsule 90 capsule 3     Sig: Take 1 capsule by mouth Daily.   • pantoprazole (Protonix) 40 MG EC tablet 30 tablet 2     Sig: Take 1 tablet by mouth Daily.   • tiZANidine (ZANAFLEX) 4 MG tablet 30 tablet 0     Sig: Take 1 tablet by mouth At Night As Needed for Muscle Spasms.      Last office visit with prescribing clinician: 5/2/2022      Next office visit with prescribing clinician: 8/1/2022            Violeta Rose MA  06/08/22, 07:31 CDT

## 2022-07-07 DIAGNOSIS — K21.9 GASTROESOPHAGEAL REFLUX DISEASE, UNSPECIFIED WHETHER ESOPHAGITIS PRESENT: ICD-10-CM

## 2022-07-07 DIAGNOSIS — F51.01 PRIMARY INSOMNIA: ICD-10-CM

## 2022-07-07 DIAGNOSIS — M54.2 CERVICALGIA: ICD-10-CM

## 2022-07-07 DIAGNOSIS — F41.9 ANXIETY: ICD-10-CM

## 2022-07-07 NOTE — TELEPHONE ENCOUNTER
Rx Refill Note  Requested Prescriptions     Pending Prescriptions Disp Refills   • traZODone (DESYREL) 50 MG tablet 60 tablet 0     Sig: Take 1-2 tabs nightly for sleep.   • clonazePAM (KlonoPIN) 1 MG tablet 30 tablet 0     Sig: Take 1 tablet by mouth Daily.      Last office visit with prescribing clinician: 5/2/2022      Next office visit with prescribing clinician: 8/1/2022       contract 2/2/22  UDS 3/30/22  controlled med appt 5/2/22    {TIP  Please add Last Relevant Lab Date if appropriate: 02/02/2022    {TIP  Is Refill Pharmacy correct?: yes      Violeta Cruz MA  07/07/22, 16:14 CDT

## 2022-07-08 RX ORDER — TRAZODONE HYDROCHLORIDE 50 MG/1
TABLET ORAL
Qty: 60 TABLET | Refills: 0 | Status: SHIPPED | OUTPATIENT
Start: 2022-07-08

## 2022-07-08 RX ORDER — CLONAZEPAM 1 MG/1
1 TABLET ORAL DAILY
Qty: 30 TABLET | Refills: 0 | Status: SHIPPED | OUTPATIENT
Start: 2022-07-08 | End: 2022-08-04 | Stop reason: SDUPTHER

## 2022-07-12 ENCOUNTER — TELEMEDICINE (OUTPATIENT)
Dept: FAMILY MEDICINE CLINIC | Facility: CLINIC | Age: 27
End: 2022-07-12

## 2022-07-12 ENCOUNTER — TELEPHONE (OUTPATIENT)
Dept: INTERNAL MEDICINE | Facility: CLINIC | Age: 27
End: 2022-07-12

## 2022-07-12 DIAGNOSIS — U07.1 COVID: Primary | ICD-10-CM

## 2022-07-12 PROCEDURE — 99213 OFFICE O/P EST LOW 20 MIN: CPT | Performed by: NURSE PRACTITIONER

## 2022-07-12 RX ORDER — AZITHROMYCIN 250 MG/1
TABLET, FILM COATED ORAL
Qty: 6 TABLET | Refills: 0 | Status: SHIPPED | OUTPATIENT
Start: 2022-07-12 | End: 2022-08-04

## 2022-07-12 RX ORDER — GUAIFENESIN 600 MG/1
1200 TABLET, EXTENDED RELEASE ORAL 2 TIMES DAILY
Qty: 28 TABLET | Refills: 0 | Status: SHIPPED | OUTPATIENT
Start: 2022-07-12 | End: 2022-08-04

## 2022-07-12 RX ORDER — PREDNISONE 10 MG/1
TABLET ORAL
Qty: 21 TABLET | Refills: 0 | Status: SHIPPED | OUTPATIENT
Start: 2022-07-12 | End: 2022-08-04

## 2022-07-12 NOTE — PROGRESS NOTES
"CC:COVID    History:  Curtis Garsia is a 26 y.o. male who presents today for evaluation of the above problems.     Symptoms started yesterday with a tickle in his throat. Home test was positive this morning. Was not vaccinated. States that his neurosurgeon advised that he \"hold off\" on that.   Complains of sinus congestion, drainage, and cough. No shortness of breath.      HPI  ROS:  Review of Systems   Constitutional: Negative for fever.   HENT: Positive for congestion and postnasal drip.    Respiratory: Positive for cough. Negative for shortness of breath.        No Known Allergies  Past Medical History:   Diagnosis Date   • Anxiety    • Asthma     AS A CHILD    • Balance disorder    • Chiari I malformation (HCC)    • Cholelithiasis      Past Surgical History:   Procedure Laterality Date   • NERVE DECOMPRESSION  2011   • SHUNT REVISION       Family History   Problem Relation Age of Onset   • Chiari malformation Mother    • Stroke Mother    • Heart disease Mother    • ADD / ADHD Father    • Heart disease Father    • Heart disease Brother    • Thyroid disease Maternal Aunt    • Heart disease Maternal Aunt    • Hyperlipidemia Maternal Uncle    • No Known Problems Paternal Aunt    • ADD / ADHD Paternal Uncle    • Heart disease Paternal Uncle    • Diabetes Paternal Uncle    • No Known Problems Maternal Grandmother    • Heart disease Maternal Grandfather    • Diabetes Paternal Grandmother    • Heart disease Paternal Grandfather       reports that he has never smoked. He has never used smokeless tobacco. He reports previous alcohol use. He reports that he does not use drugs.      Current Outpatient Medications:   •  azithromycin (Zithromax) 250 MG tablet, Take 2 tablets the first day, then 1 tablet daily for 4 days., Disp: 6 tablet, Rfl: 0  •  Butalbital-APAP-Caff-Cod -00-30 MG capsule, Every 6 (Six) Hours., Disp: , Rfl:   •  clonazePAM (KlonoPIN) 1 MG tablet, Take 1 tablet by mouth Daily., Disp: 30 tablet, " Rfl: 0  •  diclofenac (VOLTAREN) 50 MG EC tablet, Take 1 tablet by mouth 2 (Two) Times a Day As Needed (neck and back pain). Take with food., Disp: 30 tablet, Rfl: 0  •  DULoxetine (CYMBALTA) 60 MG capsule, Take 1 capsule by mouth Daily., Disp: 90 capsule, Rfl: 3  •  gabapentin (NEURONTIN) 300 MG capsule, Take 300 mg by mouth 3 (Three) Times a Day., Disp: , Rfl:   •  guaiFENesin (Mucinex) 600 MG 12 hr tablet, Take 2 tablets by mouth 2 (Two) Times a Day., Disp: 28 tablet, Rfl: 0  •  losartan (COZAAR) 25 MG tablet, Take 1 tablet by mouth Daily., Disp: 30 tablet, Rfl: 11  •  ondansetron (ZOFRAN) 4 MG tablet, Take 4 mg by mouth Every 6 (Six) Hours As Needed for Nausea., Disp: , Rfl:   •  pantoprazole (Protonix) 40 MG EC tablet, Take 1 tablet by mouth Daily., Disp: 30 tablet, Rfl: 2  •  predniSONE (DELTASONE) 10 MG (21) dose pack, Use as directed on package, Disp: 21 tablet, Rfl: 0  •  predniSONE (DELTASONE) 10 MG (21) dose pack, Use as directed on package, Disp: 21 tablet, Rfl: 0  •  tiZANidine (ZANAFLEX) 4 MG tablet, Take 1 tablet by mouth At Night As Needed for Muscle Spasms., Disp: 30 tablet, Rfl: 2  •  topiramate (TOPAMAX) 100 MG tablet, Take 125 mg by mouth 2 (Two) Times a Day., Disp: , Rfl:   •  traZODone (DESYREL) 50 MG tablet, Take 1-2 tabs nightly for sleep., Disp: 60 tablet, Rfl: 0    OBJECTIVE:  There were no vitals taken for this visit.   Physical Exam  Constitutional:       Appearance: He is well-developed.   Pulmonary:      Effort: Pulmonary effort is normal.   Neurological:      Mental Status: He is alert and oriented to person, place, and time.   Psychiatric:         Behavior: Behavior normal.         Assessment/Plan    Diagnoses and all orders for this visit:    1. COVID (Primary)  -     Cancel: rapid covid - Miscellaneous Test; Future  -     azithromycin (Zithromax) 250 MG tablet; Take 2 tablets the first day, then 1 tablet daily for 4 days.  Dispense: 6 tablet; Refill: 0  -     predniSONE (DELTASONE)  10 MG (21) dose pack; Use as directed on package  Dispense: 21 tablet; Refill: 0  -     guaiFENesin (Mucinex) 600 MG 12 hr tablet; Take 2 tablets by mouth 2 (Two) Times a Day.  Dispense: 28 tablet; Refill: 0  -     rapid covid - Miscellaneous Test; Future    Antibody infusion ordered. Not candidate for Paxlovid.     This visit was completed via secure Zoom connection.       An After Visit Summary was printed and given to the patient at discharge.  Return if symptoms worsen or fail to improve, for Next scheduled follow up.       FRANKY Fitzgerald 7/12/22    Electronically signed.

## 2022-07-12 NOTE — TELEPHONE ENCOUNTER
Caller: YEHUDA SANTIAGO    Relationship to patient: Emergency Contact    Best call back number: [phone]    Date of exposure: 7/10/22    Date of positive COVID19 test: 7/12/22    Date if possible COVID19 exposure: 7/10/22    COVID19 symptoms: COUGH, SORE THROAT    Date of initial quarantine: 07/12/22    Additional information or concerns: PATIENT HAS TESTED POSITIVE FOR COVID AND IS WANTING TO KNOW IF SOMETHING CAN BE CALLED IN TO HELP TREAT THE SYMPTOMS. PLEASE ADVISE.    What is the patients preferred pharmacy: [facility] [address] [place] [address].[facility] [address] [phone] [facility] [phone]   [phone]

## 2022-07-27 DIAGNOSIS — E87.6 HYPOKALEMIA: Primary | ICD-10-CM

## 2022-07-28 LAB
ALBUMIN SERPL-MCNC: 4.7 G/DL (ref 3.5–5.2)
ALBUMIN/GLOB SERPL: 2.4 G/DL
ALP SERPL-CCNC: 107 U/L (ref 39–117)
ALT SERPL-CCNC: 20 U/L (ref 1–41)
AST SERPL-CCNC: 16 U/L (ref 1–40)
BILIRUB SERPL-MCNC: 0.3 MG/DL (ref 0–1.2)
BUN SERPL-MCNC: 9 MG/DL (ref 6–20)
BUN/CREAT SERPL: 8 (ref 7–25)
CALCIUM SERPL-MCNC: 9.1 MG/DL (ref 8.6–10.5)
CHLORIDE SERPL-SCNC: 105 MMOL/L (ref 98–107)
CO2 SERPL-SCNC: 20.4 MMOL/L (ref 22–29)
CREAT SERPL-MCNC: 1.13 MG/DL (ref 0.76–1.27)
EGFRCR SERPLBLD CKD-EPI 2021: 90.8 ML/MIN/1.73
GLOBULIN SER CALC-MCNC: 2 GM/DL
GLUCOSE SERPL-MCNC: 182 MG/DL (ref 65–99)
POTASSIUM SERPL-SCNC: 3.5 MMOL/L (ref 3.5–5.2)
PROT SERPL-MCNC: 6.7 G/DL (ref 6–8.5)
SODIUM SERPL-SCNC: 139 MMOL/L (ref 136–145)
SPECIMEN STATUS: NORMAL
WRITTEN AUTHORIZATION: NORMAL

## 2022-07-28 NOTE — PROGRESS NOTES
Please add a1c.  His blood sugar is really high!  Let him know that his potassium is fine on repeat lab, but his sugar was high, so I am adding additional test.

## 2022-07-29 ENCOUNTER — CLINICAL SUPPORT (OUTPATIENT)
Dept: FAMILY MEDICINE CLINIC | Facility: CLINIC | Age: 27
End: 2022-07-29

## 2022-07-29 DIAGNOSIS — R73.9 ELEVATED BLOOD SUGAR: Primary | ICD-10-CM

## 2022-07-29 LAB
EXPIRATION DATE: NORMAL
HBA1C MFR BLD: 5.4 %
Lab: NORMAL

## 2022-07-29 PROCEDURE — 83036 HEMOGLOBIN GLYCOSYLATED A1C: CPT | Performed by: NURSE PRACTITIONER

## 2022-08-04 ENCOUNTER — OFFICE VISIT (OUTPATIENT)
Dept: FAMILY MEDICINE CLINIC | Facility: CLINIC | Age: 27
End: 2022-08-04

## 2022-08-04 VITALS
HEIGHT: 68 IN | DIASTOLIC BLOOD PRESSURE: 86 MMHG | HEART RATE: 84 BPM | OXYGEN SATURATION: 98 % | WEIGHT: 210.4 LBS | TEMPERATURE: 98.9 F | BODY MASS INDEX: 31.89 KG/M2 | SYSTOLIC BLOOD PRESSURE: 123 MMHG

## 2022-08-04 DIAGNOSIS — F41.9 ANXIETY: Primary | ICD-10-CM

## 2022-08-04 DIAGNOSIS — M54.2 CERVICALGIA: ICD-10-CM

## 2022-08-04 PROCEDURE — 99213 OFFICE O/P EST LOW 20 MIN: CPT | Performed by: NURSE PRACTITIONER

## 2022-08-04 RX ORDER — TIZANIDINE 4 MG/1
4 TABLET ORAL NIGHTLY PRN
Qty: 30 TABLET | Refills: 2 | Status: SHIPPED | OUTPATIENT
Start: 2022-08-04 | End: 2022-12-02 | Stop reason: SDUPTHER

## 2022-08-04 RX ORDER — CLONAZEPAM 1 MG/1
1 TABLET ORAL DAILY
Qty: 30 TABLET | Refills: 0 | Status: SHIPPED | OUTPATIENT
Start: 2022-08-04 | End: 2022-09-13 | Stop reason: SDUPTHER

## 2022-08-04 NOTE — PROGRESS NOTES
CC: anxiety     History:  Curtis Garsia is a 26 y.o. male who presents today for evaluation of the above problems.     Patient is here for controlled medication monitoring. Contract and UDS are current. He reports that Klonopin does continue to work well for his anxiety.      HPI  ROS:  Review of Systems   Psychiatric/Behavioral: The patient is nervous/anxious.        No Known Allergies  Past Medical History:   Diagnosis Date   • Anxiety    • Asthma     AS A CHILD    • Balance disorder    • Chiari I malformation (HCC)    • Cholelithiasis      Past Surgical History:   Procedure Laterality Date   • NERVE DECOMPRESSION  2011   • SHUNT REVISION       Family History   Problem Relation Age of Onset   • Chiari malformation Mother    • Stroke Mother    • Heart disease Mother    • ADD / ADHD Father    • Heart disease Father    • Heart disease Brother    • Thyroid disease Maternal Aunt    • Heart disease Maternal Aunt    • Hyperlipidemia Maternal Uncle    • No Known Problems Paternal Aunt    • ADD / ADHD Paternal Uncle    • Heart disease Paternal Uncle    • Diabetes Paternal Uncle    • No Known Problems Maternal Grandmother    • Heart disease Maternal Grandfather    • Diabetes Paternal Grandmother    • Heart disease Paternal Grandfather       reports that he has never smoked. He has never used smokeless tobacco. He reports previous alcohol use. He reports that he does not use drugs.      Current Outpatient Medications:   •  Butalbital-APAP-Caff-Cod -13-30 MG capsule, Every 6 (Six) Hours., Disp: , Rfl:   •  clonazePAM (KlonoPIN) 1 MG tablet, Take 1 tablet by mouth Daily., Disp: 30 tablet, Rfl: 0  •  DULoxetine (CYMBALTA) 60 MG capsule, Take 1 capsule by mouth Daily., Disp: 90 capsule, Rfl: 3  •  gabapentin (NEURONTIN) 300 MG capsule, Take 300 mg by mouth 3 (Three) Times a Day., Disp: , Rfl:   •  losartan (COZAAR) 25 MG tablet, Take 1 tablet by mouth Daily., Disp: 30 tablet, Rfl: 11  •  pantoprazole (Protonix) 40 MG  "EC tablet, Take 1 tablet by mouth Daily., Disp: 30 tablet, Rfl: 2  •  tiZANidine (ZANAFLEX) 4 MG tablet, Take 1 tablet by mouth At Night As Needed for Muscle Spasms., Disp: 30 tablet, Rfl: 2  •  topiramate (TOPAMAX) 100 MG tablet, Take 125 mg by mouth 2 (Two) Times a Day., Disp: , Rfl:   •  traZODone (DESYREL) 50 MG tablet, Take 1-2 tabs nightly for sleep., Disp: 60 tablet, Rfl: 0    OBJECTIVE:  /86 (BP Location: Left arm, Patient Position: Sitting, Cuff Size: Large Adult)   Pulse 84   Temp 98.9 °F (37.2 °C) (Temporal)   Ht 172.7 cm (68\")   Wt 95.4 kg (210 lb 6.4 oz)   SpO2 98%   BMI 31.99 kg/m²    Physical Exam  Vitals reviewed.   Constitutional:       Appearance: He is well-developed.   Cardiovascular:      Rate and Rhythm: Normal rate and regular rhythm.      Heart sounds: Normal heart sounds.   Pulmonary:      Effort: Pulmonary effort is normal.      Breath sounds: Normal breath sounds.   Neurological:      Mental Status: He is alert and oriented to person, place, and time.   Psychiatric:         Behavior: Behavior normal.         Assessment/Plan    Diagnoses and all orders for this visit:    1. Anxiety (Primary)  -     clonazePAM (KlonoPIN) 1 MG tablet; Take 1 tablet by mouth Daily.  Dispense: 30 tablet; Refill: 0    Continue klonopin at current dose.     An After Visit Summary was printed and given to the patient at discharge.  Return in about 3 months (around 11/4/2022) for Next scheduled follow up.       FRANKY Fitzgerald 8/4/22    Electronically signed.  "

## 2022-08-04 NOTE — TELEPHONE ENCOUNTER
Rx Refill Note  Requested Prescriptions     Pending Prescriptions Disp Refills   • tiZANidine (ZANAFLEX) 4 MG tablet 30 tablet 2     Sig: Take 1 tablet by mouth At Night As Needed for Muscle Spasms.      Last office visit with prescribing clinician: 8/4/2022      Next office visit with prescribing clinician: 11/4/2022   CPE done 02/03/2022    {TIP  Please add Last Relevant Lab Date if appropriate 02/02/2022  {TIP  Is Refill Pharmacy correct?: yes    Violeta Cruz MA  08/04/22, 16:04 CDT

## 2022-08-05 ENCOUNTER — PATIENT ROUNDING (BHMG ONLY) (OUTPATIENT)
Dept: FAMILY MEDICINE CLINIC | Facility: CLINIC | Age: 27
End: 2022-08-05

## 2022-08-05 NOTE — PROGRESS NOTES
August 5, 2022    Hello, may I speak with Curtis Garsia?    My name is Nidia    I am  with [facility]  [address], [address]  [place] [address] [phone].    Before we get started may I verify your date of birth? 1995    I am calling to officially welcome you to our practice and ask about your recent visit. Is this a good time to talk? yes    Tell me about your visit with us. What things went well?  No complaints!  All was Good!       We're always looking for ways to make our patients' experiences even better. Do you have recommendations on ways we may improve?  no    Overall were you satisfied with your first visit to our practice? yes       I appreciate you taking the time to speak with me today. Is there anything else I can do for you? no      Thank you, and have a great day.

## 2022-08-09 RX ORDER — TAMSULOSIN HYDROCHLORIDE 0.4 MG/1
1 CAPSULE ORAL DAILY
Qty: 15 CAPSULE | Refills: 0 | Status: SHIPPED | OUTPATIENT
Start: 2022-08-09 | End: 2022-09-07

## 2022-08-31 RX ORDER — LOSARTAN POTASSIUM 25 MG/1
25 TABLET ORAL DAILY
Qty: 30 TABLET | Refills: 11 | Status: SHIPPED | OUTPATIENT
Start: 2022-08-31

## 2022-09-07 ENCOUNTER — OFFICE VISIT (OUTPATIENT)
Dept: FAMILY MEDICINE CLINIC | Facility: CLINIC | Age: 27
End: 2022-09-07

## 2022-09-07 VITALS
SYSTOLIC BLOOD PRESSURE: 122 MMHG | DIASTOLIC BLOOD PRESSURE: 87 MMHG | TEMPERATURE: 98.9 F | WEIGHT: 217 LBS | HEART RATE: 101 BPM | OXYGEN SATURATION: 98 % | BODY MASS INDEX: 32.89 KG/M2 | RESPIRATION RATE: 18 BRPM | HEIGHT: 68 IN

## 2022-09-07 DIAGNOSIS — J01.00 ACUTE NON-RECURRENT MAXILLARY SINUSITIS: Primary | ICD-10-CM

## 2022-09-07 LAB
EXPIRATION DATE: NORMAL
FLUAV AG UPPER RESP QL IA.RAPID: NOT DETECTED
FLUBV AG UPPER RESP QL IA.RAPID: NOT DETECTED
INTERNAL CONTROL: NORMAL
Lab: NORMAL
SARS-COV-2 AG UPPER RESP QL IA.RAPID: NOT DETECTED

## 2022-09-07 PROCEDURE — 99213 OFFICE O/P EST LOW 20 MIN: CPT | Performed by: NURSE PRACTITIONER

## 2022-09-07 PROCEDURE — 87428 SARSCOV & INF VIR A&B AG IA: CPT | Performed by: NURSE PRACTITIONER

## 2022-09-07 RX ORDER — TRIAMCINOLONE ACETONIDE 40 MG/ML
40 INJECTION, SUSPENSION INTRA-ARTICULAR; INTRAMUSCULAR ONCE
Status: SHIPPED | OUTPATIENT
Start: 2022-09-07

## 2022-09-07 RX ORDER — GUAIFENESIN 600 MG/1
1200 TABLET, EXTENDED RELEASE ORAL DAILY
COMMUNITY
End: 2022-10-06

## 2022-09-07 RX ORDER — PREDNISONE 10 MG/1
TABLET ORAL
Qty: 21 TABLET | Refills: 0 | Status: SHIPPED | OUTPATIENT
Start: 2022-09-07 | End: 2022-10-06

## 2022-09-07 RX ORDER — AMOXICILLIN AND CLAVULANATE POTASSIUM 875; 125 MG/1; MG/1
1 TABLET, FILM COATED ORAL 2 TIMES DAILY
Qty: 14 TABLET | Refills: 0 | Status: SHIPPED | OUTPATIENT
Start: 2022-09-07 | End: 2022-10-06

## 2022-09-13 DIAGNOSIS — F41.9 ANXIETY: ICD-10-CM

## 2022-09-13 RX ORDER — CLONAZEPAM 1 MG/1
1 TABLET ORAL DAILY
Qty: 30 TABLET | Refills: 0 | Status: SHIPPED | OUTPATIENT
Start: 2022-09-13 | End: 2022-10-23 | Stop reason: SDUPTHER

## 2022-09-13 NOTE — TELEPHONE ENCOUNTER
contract 2/2/22  UDS 3/30/22  controlled med appt 8/4/22    Rx Refill Note  Requested Prescriptions     Pending Prescriptions Disp Refills   • clonazePAM (KlonoPIN) 1 MG tablet 30 tablet 0     Sig: Take 1 tablet by mouth Daily.      Last office visit with prescribing clinician: 9/7/2022      Next office visit with prescribing clinician: 11/4/2022            Violeta Rose MA  09/13/22, 07:31 CDT

## 2022-10-05 DIAGNOSIS — K21.9 GASTROESOPHAGEAL REFLUX DISEASE, UNSPECIFIED WHETHER ESOPHAGITIS PRESENT: ICD-10-CM

## 2022-10-06 ENCOUNTER — OFFICE VISIT (OUTPATIENT)
Dept: FAMILY MEDICINE CLINIC | Facility: CLINIC | Age: 27
End: 2022-10-06

## 2022-10-06 VITALS
WEIGHT: 212 LBS | BODY MASS INDEX: 32.13 KG/M2 | RESPIRATION RATE: 16 BRPM | OXYGEN SATURATION: 98 % | HEIGHT: 68 IN | DIASTOLIC BLOOD PRESSURE: 73 MMHG | TEMPERATURE: 98.7 F | SYSTOLIC BLOOD PRESSURE: 107 MMHG | HEART RATE: 68 BPM

## 2022-10-06 DIAGNOSIS — M43.6 NECK STIFFNESS: ICD-10-CM

## 2022-10-06 DIAGNOSIS — M54.2 NECK PAIN: Primary | ICD-10-CM

## 2022-10-06 DIAGNOSIS — G43.101 MIGRAINE WITH AURA AND WITH STATUS MIGRAINOSUS, NOT INTRACTABLE: ICD-10-CM

## 2022-10-06 PROCEDURE — 99213 OFFICE O/P EST LOW 20 MIN: CPT | Performed by: NURSE PRACTITIONER

## 2022-10-06 RX ORDER — PANTOPRAZOLE SODIUM 40 MG/1
40 TABLET, DELAYED RELEASE ORAL DAILY
Qty: 30 TABLET | Refills: 2 | Status: SHIPPED | OUTPATIENT
Start: 2022-10-06

## 2022-10-06 NOTE — TELEPHONE ENCOUNTER
Rx Refill Note  Requested Prescriptions     Pending Prescriptions Disp Refills   • pantoprazole (Protonix) 40 MG EC tablet 30 tablet 2     Sig: Take 1 tablet by mouth Daily.      Last office visit with prescribing clinician: 9/7/2022      Next office visit with prescribing clinician: 11/4/2022            Violeta Rose MA  10/06/22, 07:42 CDT

## 2022-10-06 NOTE — PROGRESS NOTES
Chief Complaint  Neck Pain (Stiff neck)    Subjective        Curtis Garsia presents to [facility]  History of Present Illness    Has been sick since Tuesday, unable to turn head left to right or touch to chest has associated migraine, photophobia and phonophobia.  This is the worse he has been. 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  Just doesn't feel good.  Rates pain in head and neck 9/10   Headache  Headache pattern:  Some headache always there, and the pain doesn't change much  Duration:  More than 3 years  Date current headache began:  10/5/2022  Age current headache began:  4  Frequency:  Headaches came more frequently then constant pain started  Initial event:  None  Other event details:  Kiari  Recent event:  None  Other event details:  Has kiaria with a shunt   Providers seen:  Neurologist  Previous testing:  MRI and CT  Age of onset (years):  4  Lifetime total:  20+  Longest time without a headache:  Hours  Number of ER visits for headache:  12+  Did ER treatment alleviate headache symptoms?:  No  Number of hospitalizations for headaches:  5  Did hospitalization alleviate headache symptoms?:  No  ADL impact frequency:  Always  Time of day symptoms are worse:  No specific time of day  Do headaches wake patient from sleep?: Yes    Days of the week symptoms are worse:  No specific day of the week  Season symptoms are worse:  No particular season  Quality:  Pulsating, throbbing, squeezing and tightness  Laterality:  Unable to describe  Location:  Neck  Pain severity:  9  Escalation timing:  Wakes up with severe pain  Headaches last more than three days?: Yes    Aggravating factors:  Activity, changing position, concentrating, skipping meals and lying down  Duration of aggravated symptoms:  Just during a sneeze/cough/laugh  Allodynia triggers:  None  Changes in thinking and mood:  Irritability  Changes in vision:  Blurred vision, double vision, lights and spots  Bilateral  "symptoms:  Puffy eyelids and eyes tearing  Unilateral symptoms:  Eye tearing and puffy eyelid  Stomach/GI changes:  Decreased appetite, nausea and vomiting  Changes in sensation:  Balance problems, sensitivity to light, neck pain, sensitivity to smells and sensitivity to sound  Neck Pain   This is a recurrent problem. The current episode started in the past 7 days. The problem occurs constantly. The problem has been gradually worsening. The pain is associated with nothing. The pain is present in the midline. The quality of the pain is described as aching and burning. The pain is at a severity of 9/10. The pain is severe. The symptoms are aggravated by coughing, sneezing and bending. The pain is worse during the night. Stiffness is present at night. Associated symptoms include headaches, numbness, photophobia, tingling and weakness. Pertinent negatives include no fever or leg pain. He has tried bed rest, NSAIDs and muscle relaxants for the symptoms. The treatment provided mild relief.     The following portions of the patient's history were reviewed and updated as appropriate: allergies, current medications, past family history, past medical history, past social history, past surgical history and problem list.    Objective   Vital Signs:  /73 (BP Location: Left arm, Patient Position: Sitting, Cuff Size: Adult)   Pulse 68   Temp 98.7 °F (37.1 °C) (Temporal)   Resp 16   Ht 172.7 cm (68\")   Wt 96.2 kg (212 lb)   SpO2 98%   BMI 32.23 kg/m²   Estimated body mass index is 32.23 kg/m² as calculated from the following:    Height as of this encounter: 172.7 cm (68\").    Weight as of this encounter: 96.2 kg (212 lb).    BMI is >= 30 and <35. (Class 1 Obesity). The following options were offered after discussion;: exercise counseling/recommendations and nutrition counseling/recommendations      Physical Exam  Vitals and nursing note reviewed.   Constitutional:       General: He is awake.      Appearance: Normal " appearance. He is well-developed and well-groomed. He is obese.   HENT:      Head: Normocephalic and atraumatic.      Right Ear: Hearing, tympanic membrane, ear canal and external ear normal.      Left Ear: Hearing, tympanic membrane, ear canal and external ear normal.      Nose: Nose normal.      Mouth/Throat:      Lips: Pink.      Pharynx: Oropharynx is clear.   Eyes:      General: Lids are normal.      Extraocular Movements: Extraocular movements intact.      Conjunctiva/sclera: Conjunctivae normal.      Pupils: Pupils are equal, round, and reactive to light.   Neck:      Thyroid: No thyroid mass or thyromegaly.      Vascular: Normal carotid pulses. No carotid bruit or JVD.     Cardiovascular:      Rate and Rhythm: Normal rate and regular rhythm.      Heart sounds: Normal heart sounds.   Pulmonary:      Effort: Pulmonary effort is normal.      Breath sounds: Normal breath sounds and air entry.   Musculoskeletal:      Cervical back: Rigidity present. Pain with movement and muscular tenderness present. Decreased range of motion.      Right lower leg: No edema.      Left lower leg: No edema.   Lymphadenopathy:      Head:      Right side of head: No submental, submandibular or tonsillar adenopathy.      Left side of head: No submental, submandibular or tonsillar adenopathy.      Cervical: No cervical adenopathy.   Skin:     General: Skin is warm and dry.   Neurological:      Mental Status: He is alert.      Cranial Nerves: Cranial nerve deficit present.      Sensory: Sensation is intact.      Motor: Motor function is intact.      Coordination: Coordination is intact.      Gait: Gait is intact.      Comments: Weakness on the right ;   Psychiatric:         Attention and Perception: Attention and perception normal.         Mood and Affect: Mood and affect normal.         Speech: Speech normal.         Behavior: Behavior normal. Behavior is cooperative.         Thought Content: Thought content normal.          Cognition and Memory: Cognition and memory normal.         Judgment: Judgment normal.        Result Review :                Assessment and Plan   Diagnoses and all orders for this visit:    1. Neck pain (Primary)    2. Neck stiffness    3. Migraine with aura and with status migrainosus, not intractable    I had long discussion with pt and significant other.  Since it is Friday and I can't get stat labs or other diagnostic testing I recommend they go to ER because they says this is not typical for him.  I attempted to call his neurologist Dr. Healy in [place] however his office is closed today.  I recommend they go to [place] ER incase the neurologist needed to be consulted. They verbalized understanding        Follow Up   Return TO ER in [place] today.  Patient was given instructions and counseling regarding his condition or for health maintenance advice. Please see specific information pulled into the AVS if appropriate.     Electronically signed by Danay Chery DNP, FRANKY, 10/06/22, 9:01 AM CDT.

## 2022-10-24 DIAGNOSIS — F41.9 ANXIETY: ICD-10-CM

## 2022-10-24 RX ORDER — CLONAZEPAM 1 MG/1
1 TABLET ORAL DAILY
Qty: 30 TABLET | Refills: 0 | Status: SHIPPED | OUTPATIENT
Start: 2022-10-24 | End: 2022-12-02 | Stop reason: SDUPTHER

## 2022-10-24 NOTE — TELEPHONE ENCOUNTER
contract 2/2/22  UDS 3/30/22  controlled med appt 8/4/22    Rx Refill Note  Requested Prescriptions     Pending Prescriptions Disp Refills   • clonazePAM (KlonoPIN) 1 MG tablet 30 tablet 0     Sig: Take 1 tablet by mouth Daily.      Last office visit with prescribing clinician: 9/7/2022      Next office visit with prescribing clinician: 11/4/2022            Violeta Rose MA  10/24/22, 08:36 CDT

## 2022-11-04 ENCOUNTER — OFFICE VISIT (OUTPATIENT)
Dept: FAMILY MEDICINE CLINIC | Facility: CLINIC | Age: 27
End: 2022-11-04

## 2022-11-04 VITALS
DIASTOLIC BLOOD PRESSURE: 78 MMHG | BODY MASS INDEX: 32.43 KG/M2 | TEMPERATURE: 97.8 F | WEIGHT: 214 LBS | SYSTOLIC BLOOD PRESSURE: 115 MMHG | HEART RATE: 77 BPM | OXYGEN SATURATION: 99 % | HEIGHT: 68 IN

## 2022-11-04 DIAGNOSIS — F41.9 ANXIETY: ICD-10-CM

## 2022-11-04 PROBLEM — R56.9 SEIZURES: Status: ACTIVE | Noted: 2022-04-09

## 2022-11-04 PROCEDURE — 99213 OFFICE O/P EST LOW 20 MIN: CPT | Performed by: NURSE PRACTITIONER

## 2022-11-04 RX ORDER — DULOXETIN HYDROCHLORIDE 60 MG/1
60 CAPSULE, DELAYED RELEASE ORAL 2 TIMES DAILY
Qty: 90 CAPSULE | Refills: 3
Start: 2022-11-04

## 2022-11-04 RX ORDER — CLONAZEPAM 1 MG/1
1 TABLET ORAL DAILY
Qty: 30 TABLET | Refills: 0 | Status: CANCELLED | OUTPATIENT
Start: 2022-11-04

## 2022-11-04 NOTE — PROGRESS NOTES
CC: controlled medication monitoring    History:  Curtis Garsia is a 27 y.o. male who presents today for evaluation of the above problems.      Patient is here for controlled medication monitoring. Continues to take klonopin one time daily for anxiety. This continues to work well. He has no new issues today.     CONTROLLED SUBSTANCE TRACKING 5/2/2022 11/4/2022   Last Gopal 5/2/2022 11/4/2022   Report Number reviewed through epic reviewed through [facility]   Last UDS 2/2/2022 3/30/2022   Last Controlled Substance Agreement 3/30/2022 2/22/2022     Does note that he has started seeing psychiatry and they have increased his cymbalta to 120 mg daily.   He does not need a refill on his klonopin today.     HPI  ROS:  Review of Systems   Respiratory: Negative for shortness of breath.    Cardiovascular: Negative for chest pain.   Neurological: Positive for dizziness.       No Known Allergies  Past Medical History:   Diagnosis Date   • Anxiety    • Asthma     AS A CHILD    • Balance disorder    • Chiari I malformation (HCC)    • Cholelithiasis      Past Surgical History:   Procedure Laterality Date   • NERVE DECOMPRESSION  2011   • SHUNT REVISION       Family History   Problem Relation Age of Onset   • Chiari malformation Mother    • Stroke Mother    • Heart disease Mother    • ADD / ADHD Father    • Heart disease Father    • Heart disease Brother    • Thyroid disease Maternal Aunt    • Heart disease Maternal Aunt    • Hyperlipidemia Maternal Uncle    • No Known Problems Paternal Aunt    • ADD / ADHD Paternal Uncle    • Heart disease Paternal Uncle    • Diabetes Paternal Uncle    • No Known Problems Maternal Grandmother    • Heart disease Maternal Grandfather    • Diabetes Paternal Grandmother    • Heart disease Paternal Grandfather       reports that he has never smoked. He has never used smokeless tobacco. He reports that he does not currently use alcohol. He reports that he does not use drugs.      Current Outpatient  "Medications:   •  Butalbital-APAP-Caff-Cod -03-30 MG capsule, Every 6 (Six) Hours., Disp: , Rfl:   •  clonazePAM (KlonoPIN) 1 MG tablet, Take 1 tablet by mouth Daily., Disp: 30 tablet, Rfl: 0  •  DULoxetine (CYMBALTA) 60 MG capsule, Take 1 capsule by mouth 2 (Two) Times a Day., Disp: 90 capsule, Rfl: 3  •  gabapentin (NEURONTIN) 300 MG capsule, Take 300 mg by mouth 3 (Three) Times a Day., Disp: , Rfl:   •  losartan (COZAAR) 25 MG tablet, Take 1 tablet by mouth Daily., Disp: 30 tablet, Rfl: 11  •  pantoprazole (Protonix) 40 MG EC tablet, Take 1 tablet by mouth Daily., Disp: 30 tablet, Rfl: 2  •  tiZANidine (ZANAFLEX) 4 MG tablet, Take 1 tablet by mouth At Night As Needed for Muscle Spasms., Disp: 30 tablet, Rfl: 2  •  topiramate (TOPAMAX) 100 MG tablet, Take 125 mg by mouth 2 (Two) Times a Day., Disp: , Rfl:   •  traZODone (DESYREL) 50 MG tablet, Take 1-2 tabs nightly for sleep., Disp: 60 tablet, Rfl: 0    Current Facility-Administered Medications:   •  triamcinolone acetonide (KENALOG-40) injection 40 mg, 40 mg, Intramuscular, Once, Delilah Kulkarni, APRN    OBJECTIVE:  /78 (BP Location: Left arm, Patient Position: Sitting, Cuff Size: Large Adult)   Pulse 77   Temp 97.8 °F (36.6 °C) (Temporal)   Ht 172.7 cm (68\")   Wt 97.1 kg (214 lb)   SpO2 99%   BMI 32.54 kg/m²    Physical Exam  Vitals reviewed.   Constitutional:       Appearance: He is well-developed.   Cardiovascular:      Rate and Rhythm: Normal rate and regular rhythm.      Heart sounds: Normal heart sounds.   Pulmonary:      Effort: Pulmonary effort is normal.      Breath sounds: Normal breath sounds.   Neurological:      Mental Status: He is alert and oriented to person, place, and time.   Psychiatric:         Behavior: Behavior normal.         Assessment/Plan    Diagnoses and all orders for this visit:    1. Anxiety    Other orders  -     DULoxetine (CYMBALTA) 60 MG capsule; Take 1 capsule by mouth 2 (Two) Times a Day.  Dispense: 90 " capsule; Refill: 3    Contract and UDS up to date. Gopal reviewed and appropriate.    An After Visit Summary was printed and given to the patient at discharge.  Return in about 3 months (around 2/4/2023) for Annual physical.       FRANKY Fitzgerald 11/4/22    Electronically signed.

## 2022-12-02 DIAGNOSIS — F41.9 ANXIETY: ICD-10-CM

## 2022-12-02 DIAGNOSIS — M54.2 CERVICALGIA: ICD-10-CM

## 2022-12-02 RX ORDER — CLONAZEPAM 1 MG/1
1 TABLET ORAL DAILY
Qty: 30 TABLET | Refills: 0 | Status: SHIPPED | OUTPATIENT
Start: 2022-12-02 | End: 2023-01-09 | Stop reason: SDUPTHER

## 2022-12-02 RX ORDER — TIZANIDINE 4 MG/1
4 TABLET ORAL NIGHTLY PRN
Qty: 30 TABLET | Refills: 2 | Status: SHIPPED | OUTPATIENT
Start: 2022-12-02 | End: 2023-02-03

## 2022-12-02 NOTE — TELEPHONE ENCOUNTER
Rx Refill Note  Requested Prescriptions     Pending Prescriptions Disp Refills   • clonazePAM (KlonoPIN) 1 MG tablet 30 tablet 0     Sig: Take 1 tablet by mouth Daily.      Last office visit with prescribing clinician: Visit date not found   Last telemedicine visit with prescribing clinician: 12/2/2022   Next office visit with prescribing clinician: Visit date not found     controlled med appt 11/4/22  contract 2/2/22  UDS 3/30/22      {TIP  Please add Last Relevant Lab Date if appropriate:              {TIP  Is Refill Pharmacy correct?: yes      Would you like a call back once the refill request has been completed: [] Yes [] No    If the office needs to give you a call back, can they leave a voicemail: [] Yes [] No    Violeta Cruz MA  12/02/22, 08:46 CST

## 2022-12-02 NOTE — TELEPHONE ENCOUNTER
Rx Refill Note  Requested Prescriptions     Pending Prescriptions Disp Refills   • tiZANidine (ZANAFLEX) 4 MG tablet 30 tablet 2     Sig: Take 1 tablet by mouth At Night As Needed for Muscle Spasms.      Last office visit with prescribing clinician: 11/4/2022   Last telemedicine visit with prescribing clinician: 2/3/2023   Next office visit with prescribing clinician: 2/3/2023                         Would you like a call back once the refill request has been completed: [] Yes [] No    If the office needs to give you a call back, can they leave a voicemail: [] Yes [] No    Violeta Cruz MA  12/02/22, 09:23 CST

## 2023-01-09 DIAGNOSIS — F41.9 ANXIETY: ICD-10-CM

## 2023-01-09 RX ORDER — CLONAZEPAM 1 MG/1
1 TABLET ORAL DAILY
Qty: 30 TABLET | Refills: 0 | Status: SHIPPED | OUTPATIENT
Start: 2023-01-09 | End: 2023-03-13 | Stop reason: SDUPTHER

## 2023-01-09 NOTE — TELEPHONE ENCOUNTER
Rx Refill Note  Requested Prescriptions     Pending Prescriptions Disp Refills   • clonazePAM (KlonoPIN) 1 MG tablet 30 tablet 0     Sig: Take 1 tablet by mouth Daily.      Last office visit with prescribing clinician: Visit date not found   Last telemedicine visit with prescribing clinician: 2/3/2023   Next office visit with prescribing clinician: Visit date not found   CPE done 02/02/2022    {TIP  Please add Last Relevant Lab Date if appropriate: 07/27/2022             {TIP  Is Refill Pharmacy correct?: YES      Would you like a call back once the refill request has been completed: [] Yes [] No    If the office needs to give you a call back, can they leave a voicemail: [] Yes [] No    Violeta Cruz MA  01/09/23, 09:26 CST

## 2023-02-03 ENCOUNTER — OFFICE VISIT (OUTPATIENT)
Dept: FAMILY MEDICINE CLINIC | Facility: CLINIC | Age: 28
End: 2023-02-03
Payer: COMMERCIAL

## 2023-02-03 VITALS
TEMPERATURE: 97.1 F | SYSTOLIC BLOOD PRESSURE: 122 MMHG | HEART RATE: 93 BPM | DIASTOLIC BLOOD PRESSURE: 84 MMHG | BODY MASS INDEX: 32.74 KG/M2 | WEIGHT: 216 LBS | OXYGEN SATURATION: 98 % | HEIGHT: 68 IN

## 2023-02-03 DIAGNOSIS — Z98.2 S/P VP SHUNT: ICD-10-CM

## 2023-02-03 DIAGNOSIS — Z51.81 THERAPEUTIC DRUG MONITORING: ICD-10-CM

## 2023-02-03 DIAGNOSIS — F41.9 ANXIETY: ICD-10-CM

## 2023-02-03 DIAGNOSIS — I10 PRIMARY HYPERTENSION: ICD-10-CM

## 2023-02-03 DIAGNOSIS — E66.9 OBESITY (BMI 30-39.9): ICD-10-CM

## 2023-02-03 DIAGNOSIS — K21.9 GASTROESOPHAGEAL REFLUX DISEASE, UNSPECIFIED WHETHER ESOPHAGITIS PRESENT: ICD-10-CM

## 2023-02-03 DIAGNOSIS — R53.83 FATIGUE, UNSPECIFIED TYPE: ICD-10-CM

## 2023-02-03 DIAGNOSIS — Z00.00 ANNUAL PHYSICAL EXAM: Primary | ICD-10-CM

## 2023-02-03 PROBLEM — F32.A DEPRESSIVE DISORDER: Status: ACTIVE | Noted: 2023-01-12

## 2023-02-03 PROBLEM — N13.2 HYDRONEPHROSIS WITH RENAL AND URETERAL CALCULUS OBSTRUCTION: Status: ACTIVE | Noted: 2023-01-12

## 2023-02-03 LAB
BILIRUB BLD-MCNC: NEGATIVE MG/DL
CLARITY, POC: CLEAR
COLOR UR: YELLOW
GLUCOSE UR STRIP-MCNC: NEGATIVE MG/DL
KETONES UR QL: NEGATIVE
LEUKOCYTE EST, POC: NEGATIVE
NITRITE UR-MCNC: NEGATIVE MG/ML
PH UR: 5 [PH] (ref 5–8)
PROT UR STRIP-MCNC: NEGATIVE MG/DL
RBC # UR STRIP: NEGATIVE /UL
SP GR UR: 1.03 (ref 1–1.03)
UROBILINOGEN UR QL: NORMAL

## 2023-02-03 PROCEDURE — 99395 PREV VISIT EST AGE 18-39: CPT | Performed by: NURSE PRACTITIONER

## 2023-02-03 PROCEDURE — 81003 URINALYSIS AUTO W/O SCOPE: CPT | Performed by: NURSE PRACTITIONER

## 2023-02-03 RX ORDER — FAMOTIDINE 20 MG/1
20 TABLET, FILM COATED ORAL 2 TIMES DAILY PRN
Qty: 60 TABLET | Refills: 2 | Status: SHIPPED | OUTPATIENT
Start: 2023-02-03

## 2023-02-03 RX ORDER — DEXTROAMPHETAMINE SACCHARATE, AMPHETAMINE ASPARTATE, DEXTROAMPHETAMINE SULFATE AND AMPHETAMINE SULFATE 1.25; 1.25; 1.25; 1.25 MG/1; MG/1; MG/1; MG/1
5 TABLET ORAL DAILY
COMMUNITY

## 2023-02-03 RX ORDER — TAMSULOSIN HYDROCHLORIDE 0.4 MG/1
0.4 CAPSULE ORAL DAILY
COMMUNITY
Start: 2023-01-12

## 2023-02-03 NOTE — PROGRESS NOTES
CC: annual physical and controlled medication monitoring    History:  Curtis Garsia is a 27 y.o. male who presents today for evaluation of the above problems.      Patient is here today for his annual physical.  He has had multiple issues going on recently.  He has had recent kidney stones and has been on Flomax and able to pass at least 1 stone.  Will be following up with urology in the near future regarding additional stones that were seen.  In the process of having this imaging it was noted that he also has a hiatal hernia.  He does have some bloating and nausea/vomiting as a result of this.  He has been on Protonix for approximately a year and this has helped, however, he does still have some symptoms.  Has been taking Pepto-Bismol daily.  He is also here for controlled medication monitoring of his clonazepam.  This does continue to work well for him without issue.    CONTROLLED SUBSTANCE TRACKING 5/2/2022 11/4/2022 2/3/2023   Last Gopal 5/2/2022 11/4/2022 2/3/2023   Report Number reviewed through epic reviewed through [facility] reviewed through [facility]   Last UDS 2/2/2022 3/30/2022 2/3/2023   Last Controlled Substance Agreement 3/30/2022 2/22/2022 2/3/2023       HPI  ROS:  Review of Systems   HENT:        Ear sensitivity.  Wears earplugs.   Respiratory: Negative for chest tightness and shortness of breath.    Gastrointestinal: Positive for abdominal distention and nausea.       No Known Allergies  Past Medical History:   Diagnosis Date   • Anxiety    • Asthma     AS A CHILD    • Balance disorder    • Chiari I malformation (HCC)    • Cholelithiasis      Past Surgical History:   Procedure Laterality Date   • NERVE DECOMPRESSION  2011   • SHUNT REVISION       Family History   Problem Relation Age of Onset   • Chiari malformation Mother    • Stroke Mother    • Heart disease Mother    • ADD / ADHD Father    • Heart disease Father    • Heart disease Brother    • Thyroid disease Maternal Aunt    • Heart disease  "Maternal Aunt    • Hyperlipidemia Maternal Uncle    • No Known Problems Paternal Aunt    • ADD / ADHD Paternal Uncle    • Heart disease Paternal Uncle    • Diabetes Paternal Uncle    • No Known Problems Maternal Grandmother    • Heart disease Maternal Grandfather    • Diabetes Paternal Grandmother    • Heart disease Paternal Grandfather       reports that he has never smoked. He has never used smokeless tobacco. He reports that he does not currently use alcohol. He reports that he does not use drugs.      Current Outpatient Medications:   •  amphetamine-dextroamphetamine (ADDERALL) 5 MG tablet, Take 5 mg by mouth Daily. Prescribed by psychologist, Disp: , Rfl:   •  clonazePAM (KlonoPIN) 1 MG tablet, Take 1 tablet by mouth Daily., Disp: 30 tablet, Rfl: 0  •  DULoxetine (CYMBALTA) 60 MG capsule, Take 1 capsule by mouth 2 (Two) Times a Day., Disp: 90 capsule, Rfl: 3  •  gabapentin (NEURONTIN) 300 MG capsule, Take 300 mg by mouth 3 (Three) Times a Day., Disp: , Rfl:   •  losartan (COZAAR) 25 MG tablet, Take 1 tablet by mouth Daily., Disp: 30 tablet, Rfl: 11  •  pantoprazole (Protonix) 40 MG EC tablet, Take 1 tablet by mouth Daily., Disp: 30 tablet, Rfl: 2  •  tamsulosin (FLOMAX) 0.4 MG capsule 24 hr capsule, Take 0.4 mg by mouth Daily., Disp: , Rfl:   •  topiramate (TOPAMAX) 100 MG tablet, Take 125 mg by mouth 2 (Two) Times a Day., Disp: , Rfl:   •  traZODone (DESYREL) 50 MG tablet, Take 1-2 tabs nightly for sleep., Disp: 60 tablet, Rfl: 0  •  famotidine (Pepcid) 20 MG tablet, Take 1 tablet by mouth 2 (Two) Times a Day As Needed for Heartburn., Disp: 60 tablet, Rfl: 2    Current Facility-Administered Medications:   •  triamcinolone acetonide (KENALOG-40) injection 40 mg, 40 mg, Intramuscular, Once, Delilah Kulkarni, APRN    OBJECTIVE:  /84 (BP Location: Left arm, Patient Position: Sitting, Cuff Size: Adult)   Pulse 93   Temp 97.1 °F (36.2 °C) (Temporal)   Ht 172.7 cm (68\")   Wt 98 kg (216 lb)   SpO2 98%   " BMI 32.84 kg/m²    Physical Exam  Vitals reviewed.   Constitutional:       Appearance: He is well-developed.   HENT:      Left Ear: Tympanic membrane, ear canal and external ear normal.      Ears:      Comments: Right canal is erythematous.  Neck:      Vascular: No carotid bruit.   Cardiovascular:      Rate and Rhythm: Normal rate and regular rhythm.      Heart sounds: Normal heart sounds.   Pulmonary:      Effort: Pulmonary effort is normal.      Breath sounds: Normal breath sounds.   Abdominal:      General: Abdomen is flat. Bowel sounds are normal.      Palpations: Abdomen is soft.   Neurological:      Mental Status: He is alert and oriented to person, place, and time.   Psychiatric:         Behavior: Behavior normal.         Assessment/Plan    Diagnoses and all orders for this visit:    1. Annual physical exam (Primary)  -     CBC & Differential  -     TSH  -     T4, free  -     Comprehensive Metabolic Panel  -     Lipid Panel  -     POC Urinalysis Dipstick, Multipro    2. Anxiety  -     Comprehensive Metabolic Panel    3. Gastroesophageal reflux disease, unspecified whether esophagitis present  -     CBC & Differential  -     Comprehensive Metabolic Panel  -     famotidine (Pepcid) 20 MG tablet; Take 1 tablet by mouth 2 (Two) Times a Day As Needed for Heartburn.  Dispense: 60 tablet; Refill: 2    4. S/P  shunt Dr Charly Li 05/2021    5. Primary hypertension  -     Comprehensive Metabolic Panel  -     POC Urinalysis Dipstick, Multipro    6. Fatigue, unspecified type  -     CBC & Differential  -     TSH  -     T4, free  -     Comprehensive Metabolic Panel  -     POC Urinalysis Dipstick, Multipro    7. Obesity (BMI 30-39.9)  -     Lipid Panel    8. Therapeutic drug monitoring  -     ToxASSURE Select 13 (MW) - Urine, Clean Catch      Gopal reviewed and appropriate.  Contract and UDS renewed today.    HEALTH MAINTENANCE  Labs today, including lipid screeing.    An After Visit Summary was printed and given  to the patient at discharge.  Return in about 1 month (around 3/3/2023) for Recheck - GERD/hiatal hernia.       Delilah Kulkarni, APRN 2/3/23    Electronically signed.

## 2023-02-04 LAB
ALBUMIN SERPL-MCNC: 5 G/DL (ref 3.5–5.2)
ALBUMIN/GLOB SERPL: 1.9 G/DL
ALP SERPL-CCNC: 138 U/L (ref 39–117)
ALT SERPL-CCNC: 23 U/L (ref 1–41)
AST SERPL-CCNC: 18 U/L (ref 1–40)
BASOPHILS # BLD AUTO: 0.12 10*3/MM3 (ref 0–0.2)
BASOPHILS NFR BLD AUTO: 1.2 % (ref 0–1.5)
BILIRUB SERPL-MCNC: 0.3 MG/DL (ref 0–1.2)
BUN SERPL-MCNC: 14 MG/DL (ref 6–20)
BUN/CREAT SERPL: 11.2 (ref 7–25)
CALCIUM SERPL-MCNC: 9.8 MG/DL (ref 8.6–10.5)
CHLORIDE SERPL-SCNC: 107 MMOL/L (ref 98–107)
CHOLEST SERPL-MCNC: 180 MG/DL (ref 0–200)
CO2 SERPL-SCNC: 24.3 MMOL/L (ref 22–29)
CREAT SERPL-MCNC: 1.25 MG/DL (ref 0.76–1.27)
EGFRCR SERPLBLD CKD-EPI 2021: 79.9 ML/MIN/1.73
EOSINOPHIL # BLD AUTO: 0.63 10*3/MM3 (ref 0–0.4)
EOSINOPHIL NFR BLD AUTO: 6.5 % (ref 0.3–6.2)
ERYTHROCYTE [DISTWIDTH] IN BLOOD BY AUTOMATED COUNT: 13 % (ref 12.3–15.4)
GLOBULIN SER CALC-MCNC: 2.6 GM/DL
GLUCOSE SERPL-MCNC: 98 MG/DL (ref 65–99)
HCT VFR BLD AUTO: 41.7 % (ref 37.5–51)
HDLC SERPL-MCNC: 46 MG/DL (ref 40–60)
HGB BLD-MCNC: 13.7 G/DL (ref 13–17.7)
IMM GRANULOCYTES # BLD AUTO: 0.03 10*3/MM3 (ref 0–0.05)
IMM GRANULOCYTES NFR BLD AUTO: 0.3 % (ref 0–0.5)
LDLC SERPL CALC-MCNC: 115 MG/DL (ref 0–100)
LYMPHOCYTES # BLD AUTO: 1.85 10*3/MM3 (ref 0.7–3.1)
LYMPHOCYTES NFR BLD AUTO: 19.1 % (ref 19.6–45.3)
MCH RBC QN AUTO: 25.9 PG (ref 26.6–33)
MCHC RBC AUTO-ENTMCNC: 32.9 G/DL (ref 31.5–35.7)
MCV RBC AUTO: 79 FL (ref 79–97)
MONOCYTES # BLD AUTO: 1 10*3/MM3 (ref 0.1–0.9)
MONOCYTES NFR BLD AUTO: 10.3 % (ref 5–12)
NEUTROPHILS # BLD AUTO: 6.04 10*3/MM3 (ref 1.7–7)
NEUTROPHILS NFR BLD AUTO: 62.6 % (ref 42.7–76)
NRBC BLD AUTO-RTO: 0 /100 WBC (ref 0–0.2)
PLATELET # BLD AUTO: 448 10*3/MM3 (ref 140–450)
POTASSIUM SERPL-SCNC: 4.1 MMOL/L (ref 3.5–5.2)
PROT SERPL-MCNC: 7.6 G/DL (ref 6–8.5)
RBC # BLD AUTO: 5.28 10*6/MM3 (ref 4.14–5.8)
SODIUM SERPL-SCNC: 142 MMOL/L (ref 136–145)
T4 FREE SERPL-MCNC: 1.23 NG/DL (ref 0.93–1.7)
TRIGL SERPL-MCNC: 104 MG/DL (ref 0–150)
TSH SERPL DL<=0.005 MIU/L-ACNC: 8.07 UIU/ML (ref 0.27–4.2)
VLDLC SERPL CALC-MCNC: 19 MG/DL (ref 5–40)
WBC # BLD AUTO: 9.67 10*3/MM3 (ref 3.4–10.8)

## 2023-02-06 DIAGNOSIS — R53.83 FATIGUE, UNSPECIFIED TYPE: Primary | ICD-10-CM

## 2023-02-06 RX ORDER — LEVOTHYROXINE SODIUM 0.03 MG/1
25 TABLET ORAL
Qty: 90 TABLET | Refills: 0 | Status: SHIPPED | OUTPATIENT
Start: 2023-02-06

## 2023-02-06 NOTE — PROGRESS NOTES
Labs ok except for thyroid.  TSH is elevated. Needs to start levothyroxine to bring this down. 25 mcg daily.  Recheck level in 8 weeks.

## 2023-02-11 LAB — DRUGS UR: NORMAL

## 2023-03-03 ENCOUNTER — OFFICE VISIT (OUTPATIENT)
Dept: FAMILY MEDICINE CLINIC | Facility: CLINIC | Age: 28
End: 2023-03-03
Payer: COMMERCIAL

## 2023-03-03 VITALS
SYSTOLIC BLOOD PRESSURE: 125 MMHG | TEMPERATURE: 97.7 F | WEIGHT: 215.6 LBS | HEIGHT: 68 IN | HEART RATE: 97 BPM | RESPIRATION RATE: 18 BRPM | BODY MASS INDEX: 32.67 KG/M2 | OXYGEN SATURATION: 98 % | DIASTOLIC BLOOD PRESSURE: 75 MMHG

## 2023-03-03 DIAGNOSIS — K44.9 HIATAL HERNIA: ICD-10-CM

## 2023-03-03 DIAGNOSIS — K21.9 GASTROESOPHAGEAL REFLUX DISEASE, UNSPECIFIED WHETHER ESOPHAGITIS PRESENT: Primary | ICD-10-CM

## 2023-03-03 PROCEDURE — 99212 OFFICE O/P EST SF 10 MIN: CPT | Performed by: NURSE PRACTITIONER

## 2023-03-03 NOTE — PROGRESS NOTES
CC: Recheck GERD/hiatal hernia    History:  Curtis Garsia is a 27 y.o. male who presents today for evaluation of the above problems.      Patient states that his symptoms have improved significantly since starting on famotidine in addition to his protonix.  Has had a few instances of vomiting that he attributes to a migraine, but none as a result of GERD.       HPI  ROS:  Review of Systems   Gastrointestinal: Positive for vomiting (not related to GERD). Negative for abdominal pain and nausea.       No Known Allergies  Past Medical History:   Diagnosis Date   • Anxiety    • Asthma     AS A CHILD    • Balance disorder    • Chiari I malformation (HCC)    • Cholelithiasis      Past Surgical History:   Procedure Laterality Date   • NERVE DECOMPRESSION  2011   • SHUNT REVISION       Family History   Problem Relation Age of Onset   • Chiari malformation Mother    • Stroke Mother    • Heart disease Mother    • ADD / ADHD Father    • Heart disease Father    • Heart disease Brother    • Thyroid disease Maternal Aunt    • Heart disease Maternal Aunt    • Hyperlipidemia Maternal Uncle    • No Known Problems Paternal Aunt    • ADD / ADHD Paternal Uncle    • Heart disease Paternal Uncle    • Diabetes Paternal Uncle    • No Known Problems Maternal Grandmother    • Heart disease Maternal Grandfather    • Diabetes Paternal Grandmother    • Heart disease Paternal Grandfather       reports that he has never smoked. He has never used smokeless tobacco. He reports that he does not currently use alcohol. He reports that he does not use drugs.      Current Outpatient Medications:   •  amphetamine-dextroamphetamine (ADDERALL) 5 MG tablet, Take 1 tablet by mouth Daily. Prescribed by psychologist, Disp: , Rfl:   •  clonazePAM (KlonoPIN) 1 MG tablet, Take 1 tablet by mouth Daily., Disp: 30 tablet, Rfl: 0  •  DULoxetine (CYMBALTA) 60 MG capsule, Take 1 capsule by mouth 2 (Two) Times a Day., Disp: 90 capsule, Rfl: 3  •  famotidine  "(Pepcid) 20 MG tablet, Take 1 tablet by mouth 2 (Two) Times a Day As Needed for Heartburn., Disp: 60 tablet, Rfl: 2  •  gabapentin (NEURONTIN) 300 MG capsule, Take 1 capsule by mouth 3 (Three) Times a Day., Disp: , Rfl:   •  levothyroxine (SYNTHROID, LEVOTHROID) 25 MCG tablet, Take 1 tablet by mouth Every Morning., Disp: 90 tablet, Rfl: 0  •  losartan (COZAAR) 25 MG tablet, Take 1 tablet by mouth Daily., Disp: 30 tablet, Rfl: 11  •  pantoprazole (Protonix) 40 MG EC tablet, Take 1 tablet by mouth Daily., Disp: 30 tablet, Rfl: 2  •  tamsulosin (FLOMAX) 0.4 MG capsule 24 hr capsule, Take 1 capsule by mouth Daily., Disp: , Rfl:   •  topiramate (TOPAMAX) 100 MG tablet, Take 125 mg by mouth 2 (Two) Times a Day., Disp: , Rfl:   •  traZODone (DESYREL) 50 MG tablet, Take 1-2 tabs nightly for sleep., Disp: 60 tablet, Rfl: 0    Current Facility-Administered Medications:   •  triamcinolone acetonide (KENALOG-40) injection 40 mg, 40 mg, Intramuscular, Once, Delilah Kulkarni, APRN    OBJECTIVE:  /75 (BP Location: Right arm, Patient Position: Sitting, Cuff Size: Large Adult)   Pulse 97   Temp 97.7 °F (36.5 °C) (Temporal)   Resp 18   Ht 172.7 cm (68\")   Wt 97.8 kg (215 lb 9.6 oz)   SpO2 98%   BMI 32.78 kg/m²    Physical Exam  Vitals reviewed.   Constitutional:       Appearance: He is well-developed.   Cardiovascular:      Rate and Rhythm: Normal rate.   Pulmonary:      Effort: Pulmonary effort is normal.   Neurological:      Mental Status: He is alert and oriented to person, place, and time.   Psychiatric:         Behavior: Behavior normal.         Assessment/Plan    Diagnoses and all orders for this visit:    1. Gastroesophageal reflux disease, unspecified whether esophagitis present (Primary)    2. Hiatal hernia    Continue famotidine and pantoprazole. No change at this time.     An After Visit Summary was printed and given to the patient at discharge.  Return in about 6 months (around 9/3/2023) for Next scheduled " follow up.       Delilah Kulkarni, APRN 3/3/23    Electronically signed.

## 2023-03-04 LAB — TSH SERPL DL<=0.005 MIU/L-ACNC: 3.86 UIU/ML (ref 0.27–4.2)

## 2023-03-13 DIAGNOSIS — F41.9 ANXIETY: ICD-10-CM

## 2023-03-14 RX ORDER — CLONAZEPAM 1 MG/1
1 TABLET ORAL DAILY
Qty: 30 TABLET | Refills: 0 | Status: SHIPPED | OUTPATIENT
Start: 2023-03-14

## 2023-03-14 NOTE — TELEPHONE ENCOUNTER
controlled med appt 2/3/23  contract 2/3/23  UDS 2/3/23  LRX 1/9/23    Rx Refill Note  Requested Prescriptions     Pending Prescriptions Disp Refills   • clonazePAM (KlonoPIN) 1 MG tablet 30 tablet 0     Sig: Take 1 tablet by mouth Daily.      Last office visit with prescribing clinician: 3/3/23  Last telemedicine visit with prescribing clinician: 9/7/2023   Next office visit with prescribing clinician: Visit date not found   {  Violeta Rose MA  03/14/23, 07:30 CDT

## 2023-03-20 DIAGNOSIS — F41.9 ANXIETY: Primary | ICD-10-CM

## 2023-03-20 RX ORDER — CLONAZEPAM 0.5 MG/1
0.5 TABLET ORAL 2 TIMES DAILY
Qty: 60 TABLET | Refills: 0 | Status: SHIPPED | OUTPATIENT
Start: 2023-03-20

## 2023-04-27 DIAGNOSIS — Z87.442 PERSONAL HISTORY OF KIDNEY STONES: Primary | ICD-10-CM

## 2023-05-26 ENCOUNTER — TELEPHONE (OUTPATIENT)
Dept: UROLOGY | Facility: CLINIC | Age: 28
End: 2023-05-26
Payer: COMMERCIAL

## 2023-05-26 NOTE — PROGRESS NOTES
"Subjective    Mr. Garsia is 27 y.o. male    Chief Complaint: History of Kidney Stones    History of Present Illness    27-year-old male new patient referred for history of kidney stones.  Patient reports recently passed a 4 mm left distal ureteral stone January of this year.  Reports was previously followed by Dr. Guan and Penny at [facility].  Patient reports experiencing first stone episode within the last year and a half after starting Topamax 2 years ago.  Patient with a history of Chiari malformation with a shunt.  At present patient denies any flank pain or hematuria.  Previous CT of the abdomen and pelvis completed at [facility] January 2023 revealed 1 small stone in each kidney 1 in the right upper pole measuring approximately 3 to 4 mm and the other in the left mid to upper pole measuring approximately 2 mm.  Patient reports having submitted the previous 4 mm ureteral stone for tissue pathology through [facility] and was told \"it is the normal type stone\".     Patient reporting over the past couple months experiencing urine hesitancy and intermittency.  Denies any feeling of incomplete emptying or weak stream.    The following portions of the patient's history were reviewed and updated as appropriate: allergies, current medications, past family history, past medical history, past social history, past surgical history and problem list.    Review of Systems   Constitutional:  Negative for chills, fatigue and fever.   Gastrointestinal:  Negative for abdominal pain, anal bleeding, blood in stool, nausea and vomiting.   Genitourinary:  Positive for difficulty urinating and frequency. Negative for decreased urine volume, dysuria, flank pain, hematuria and urgency.       Current Outpatient Medications:     amphetamine-dextroamphetamine (ADDERALL) 5 MG tablet, Take 1 tablet by mouth Daily. Prescribed by psychologist, Disp: , Rfl:     clonazePAM (KlonoPIN) 0.5 MG tablet, Take 1 tablet by " mouth 2 (Two) Times a Day., Disp: 60 tablet, Rfl: 0    clonazePAM (KlonoPIN) 1 MG tablet, Take 1 tablet by mouth Daily., Disp: 30 tablet, Rfl: 0    DULoxetine (CYMBALTA) 60 MG capsule, Take 1 capsule by mouth 2 (Two) Times a Day., Disp: 90 capsule, Rfl: 3    famotidine (Pepcid) 20 MG tablet, Take 1 tablet by mouth 2 (Two) Times a Day As Needed for Heartburn., Disp: 60 tablet, Rfl: 2    gabapentin (NEURONTIN) 300 MG capsule, Take 1 capsule by mouth 3 (Three) Times a Day., Disp: , Rfl:     levothyroxine (SYNTHROID, LEVOTHROID) 25 MCG tablet, Take 1 tablet by mouth Every Morning., Disp: 90 tablet, Rfl: 0    losartan (COZAAR) 25 MG tablet, Take 1 tablet by mouth Daily., Disp: 30 tablet, Rfl: 11    pantoprazole (Protonix) 40 MG EC tablet, Take 1 tablet by mouth Daily., Disp: 30 tablet, Rfl: 2    topiramate (TOPAMAX) 100 MG tablet, Take 125 mg by mouth 2 (Two) Times a Day., Disp: , Rfl:     traZODone (DESYREL) 50 MG tablet, Take 1-2 tabs nightly for sleep., Disp: 60 tablet, Rfl: 0    tamsulosin (FLOMAX) 0.4 MG capsule 24 hr capsule, Take 1 capsule by mouth Every Night for 360 days., Disp: 90 capsule, Rfl: 3    Current Facility-Administered Medications:     triamcinolone acetonide (KENALOG-40) injection 40 mg, 40 mg, Intramuscular, Once, Delilah Kulkarni, APRN    Past Medical History:   Diagnosis Date    Anxiety     Asthma     AS A CHILD     Balance disorder     Chiari I malformation     Cholelithiasis        Past Surgical History:   Procedure Laterality Date    NERVE DECOMPRESSION  2011    SHUNT REVISION         Social History     Socioeconomic History    Marital status:    Tobacco Use    Smoking status: Never    Smokeless tobacco: Never   Vaping Use    Vaping Use: Never used   Substance and Sexual Activity    Alcohol use: Not Currently    Drug use: Never    Sexual activity: Yes       Family History   Problem Relation Age of Onset    Chiari malformation Mother     Stroke Mother     Heart disease Mother     ADD /  "ADHD Father     Heart disease Father     Heart disease Brother     Thyroid disease Maternal Aunt     Heart disease Maternal Aunt     Hyperlipidemia Maternal Uncle     No Known Problems Paternal Aunt     ADD / ADHD Paternal Uncle     Heart disease Paternal Uncle     Diabetes Paternal Uncle     No Known Problems Maternal Grandmother     Heart disease Maternal Grandfather     Diabetes Paternal Grandmother     Heart disease Paternal Grandfather        Objective    Temp 97.6 °F (36.4 °C)   Ht 172.7 cm (68\")   Wt 100 kg (220 lb 9.6 oz)   BMI 33.54 kg/m²     Physical Exam  Constitutional:       Appearance: Normal appearance.   Abdominal:      Tenderness: There is no right CVA tenderness or left CVA tenderness.   Skin:     General: Skin is warm and dry.   Neurological:      Mental Status: He is alert and oriented to person, place, and time.   Psychiatric:         Mood and Affect: Mood normal.         Behavior: Behavior normal.           Results for orders placed or performed in visit on 05/30/23   POC Urinalysis Dipstick, Multipro    Specimen: Urine   Result Value Ref Range    Color Yellow Yellow, Straw, Dark Yellow, Lacie    Clarity, UA Clear Clear    Glucose, UA Negative Negative mg/dL    Bilirubin Negative Negative    Ketones, UA Negative Negative    Specific Gravity  1.025 1.005 - 1.030    Blood, UA Negative Negative    pH, Urine 6.0 5.0 - 8.0    Protein, POC Trace (A) Negative mg/dL    Urobilinogen, UA 0.2 E.U./dL Normal, 0.2 E.U./dL    Nitrite, UA Negative Negative    Leukocytes Negative Negative   KUB independent review    A KUB is available for me to review today.  The image is inspected for a bowel gas pattern and the general bone structure of the spine and pelvis. The kidneys are then inspected closely.  Renal outline is noted if identifiable. The kidney, collecting system, and anticipated path of the ureter are examined for calcifications including those in the true pelvis.  This film reveals:    On the right " there is a single renal stone measuring 3 mm.    On the left there are no calcificaitons seen in the kidney or the expected course of the ureter.     Assessment and Plan    Diagnoses and all orders for this visit:    1. Personal history of kidney stones (Primary)  -     POC Urinalysis Dipstick, Multipro  -     XR abdomen kub; Future  -     UroStone Max24 - Urine, Clean Catch; Future    2. Urinary hesitancy  -     tamsulosin (FLOMAX) 0.4 MG capsule 24 hr capsule; Take 1 capsule by mouth Every Night for 360 days.  Dispense: 90 capsule; Refill: 3      27-year-old male new patient referred for history of kidney stones.  Patient reports recently passed a 4 mm left distal ureteral stone January of this year.      KUB today the right upper pole renal stone visualized measuring approximately 3 mm.  No left renal stones seen.    Given the new onset stone burden I do feel likely that the Topamax is contributing however recommend further evaluation with 24-hour urine metabolic evaluation    We will have patient follow-up in 2 months to go over the 24-hour urine results    We will start patient on tamsulosin 0.4 mg to see if any improvement in voiding symptoms

## 2023-05-30 ENCOUNTER — OFFICE VISIT (OUTPATIENT)
Dept: UROLOGY | Facility: CLINIC | Age: 28
End: 2023-05-30

## 2023-05-30 ENCOUNTER — HOSPITAL ENCOUNTER (OUTPATIENT)
Dept: GENERAL RADIOLOGY | Facility: HOSPITAL | Age: 28
Discharge: HOME | End: 2023-05-30

## 2023-05-30 VITALS — WEIGHT: 220.6 LBS | BODY MASS INDEX: 33.43 KG/M2 | HEIGHT: 68 IN | TEMPERATURE: 97.6 F

## 2023-05-30 DIAGNOSIS — Z87.442 PERSONAL HISTORY OF KIDNEY STONES: ICD-10-CM

## 2023-05-30 DIAGNOSIS — Z87.442 PERSONAL HISTORY OF KIDNEY STONES: Primary | ICD-10-CM

## 2023-05-30 DIAGNOSIS — R39.11 URINARY HESITANCY: ICD-10-CM

## 2023-05-30 LAB
BILIRUB BLD-MCNC: NEGATIVE MG/DL
CLARITY, POC: CLEAR
COLOR UR: YELLOW
GLUCOSE UR STRIP-MCNC: NEGATIVE MG/DL
KETONES UR QL: NEGATIVE
LEUKOCYTE EST, POC: NEGATIVE
NITRITE UR-MCNC: NEGATIVE MG/ML
PH UR: 6 [PH] (ref 5–8)
PROT UR STRIP-MCNC: ABNORMAL MG/DL
RBC # UR STRIP: NEGATIVE /UL
SP GR UR: 1.02 (ref 1–1.03)
UROBILINOGEN UR QL: ABNORMAL

## 2023-05-30 PROCEDURE — 74018 RADEX ABDOMEN 1 VIEW: CPT

## 2023-05-30 RX ORDER — TAMSULOSIN HYDROCHLORIDE 0.4 MG/1
1 CAPSULE ORAL NIGHTLY
Qty: 90 CAPSULE | Refills: 3 | Status: SHIPPED | OUTPATIENT
Start: 2023-05-30 | End: 2024-05-24

## 2023-05-31 ENCOUNTER — OFFICE VISIT (OUTPATIENT)
Dept: FAMILY MEDICINE CLINIC | Facility: CLINIC | Age: 28
End: 2023-05-31

## 2023-05-31 VITALS
TEMPERATURE: 98.6 F | BODY MASS INDEX: 33.25 KG/M2 | SYSTOLIC BLOOD PRESSURE: 127 MMHG | DIASTOLIC BLOOD PRESSURE: 89 MMHG | HEIGHT: 68 IN | HEART RATE: 81 BPM | WEIGHT: 219.4 LBS | OXYGEN SATURATION: 98 %

## 2023-05-31 DIAGNOSIS — T23.261A PARTIAL THICKNESS BURN OF BACK OF RIGHT HAND, INITIAL ENCOUNTER: Primary | ICD-10-CM

## 2023-05-31 PROCEDURE — 99213 OFFICE O/P EST LOW 20 MIN: CPT | Performed by: NURSE PRACTITIONER
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
## 2023-06-12 RX ORDER — LOSARTAN POTASSIUM 25 MG/1
25 TABLET ORAL DAILY
Qty: 30 TABLET | Refills: 0 | Status: SHIPPED | OUTPATIENT
Start: 2023-06-12

## 2023-06-12 RX ORDER — DULOXETIN HYDROCHLORIDE 60 MG/1
60 CAPSULE, DELAYED RELEASE ORAL 2 TIMES DAILY
Qty: 90 CAPSULE | Refills: 3 | Status: SHIPPED | OUTPATIENT
Start: 2023-06-12

## 2023-06-12 NOTE — TELEPHONE ENCOUNTER
Rx Refill Note  Requested Prescriptions     Pending Prescriptions Disp Refills    DULoxetine (CYMBALTA) 60 MG capsule 90 capsule 3     Sig: Take 1 capsule by mouth 2 (Two) Times a Day.      Last office visit with prescribing clinician: 5/31/2023   Last telemedicine visit with prescribing clinician: Visit date not found   Next office visit with prescribing clinician: 9/7/2023                         Would you like a call back once the refill request has been completed: [] Yes [] No    If the office needs to give you a call back, can they leave a voicemail: [] Yes [] No    Violeta Rose MA  06/12/23, 07:37 CDT

## 2023-06-19 ENCOUNTER — OFFICE VISIT (OUTPATIENT)
Dept: UROLOGY | Facility: CLINIC | Age: 28
End: 2023-06-19
Payer: COMMERCIAL

## 2023-06-19 VITALS — HEIGHT: 67 IN | WEIGHT: 219.6 LBS | TEMPERATURE: 97.6 F | BODY MASS INDEX: 34.47 KG/M2

## 2023-06-19 DIAGNOSIS — N20.1 RIGHT URETERAL STONE: ICD-10-CM

## 2023-06-19 DIAGNOSIS — Z87.442 PERSONAL HISTORY OF KIDNEY STONES: Primary | ICD-10-CM

## 2023-06-19 LAB
BILIRUB BLD-MCNC: NEGATIVE MG/DL
CLARITY, POC: CLEAR
COLOR UR: YELLOW
GLUCOSE UR STRIP-MCNC: NEGATIVE MG/DL
KETONES UR QL: NEGATIVE
LEUKOCYTE EST, POC: NEGATIVE
NITRITE UR-MCNC: NEGATIVE MG/ML
PH UR: 7 [PH] (ref 5–8)
PROT UR STRIP-MCNC: ABNORMAL MG/DL
RBC # UR STRIP: NEGATIVE /UL
SP GR UR: 1.02 (ref 1–1.03)
UROBILINOGEN UR QL: ABNORMAL

## 2023-06-19 PROCEDURE — 99214 OFFICE O/P EST MOD 30 MIN: CPT | Performed by: PHYSICIAN ASSISTANT

## 2023-06-19 PROCEDURE — 81001 URINALYSIS AUTO W/SCOPE: CPT | Performed by: PHYSICIAN ASSISTANT

## 2023-06-19 RX ORDER — OXYCODONE HYDROCHLORIDE AND ACETAMINOPHEN 5; 325 MG/1; MG/1
1 TABLET ORAL EVERY 6 HOURS PRN
COMMUNITY

## 2023-06-19 NOTE — PROGRESS NOTES
Subjective    Mr. Garsia is 27 y.o. male    Chief Complaint: Kidney Stone     History of Present Illness  Patient who has had approximately 48 hours of right lower back right lower abdominal pain with nausea which comes and goes.  He has passed stones without intervention in the past he recently passed a 4 mm stone.  He went to [facility] ER yesterday CT scan which I reviewed today as he had brought the disc revealed a 4 mm stone in the right distal ureter.  Patient is not aware of passing a stone he is still symptomatic.  Denies any fever or chills he denies any decrease in urination.  Of note his serum creatinine was 1.8.  KUB was done prior to his visit today is a calcification on the right superimposed with bone.    The following portions of the patient's history were reviewed and updated as appropriate: allergies, current medications, past family history, past medical history, past social history, past surgical history and problem list.    Review of Systems   Constitutional:  Negative for chills and fever.   Gastrointestinal:  Positive for nausea and vomiting. Negative for abdominal pain, anal bleeding and blood in stool.   Genitourinary:  Positive for flank pain. Negative for dysuria and hematuria. Frequency: Right side.      Current Outpatient Medications:     amphetamine-dextroamphetamine (ADDERALL) 5 MG tablet, Take 1 tablet by mouth Daily. Prescribed by psychologist, Disp: , Rfl:     clonazePAM (KlonoPIN) 0.5 MG tablet, Take 1 tablet by mouth 2 (Two) Times a Day., Disp: 60 tablet, Rfl: 0    DULoxetine (CYMBALTA) 60 MG capsule, Take 1 capsule by mouth 2 (Two) Times a Day., Disp: 90 capsule, Rfl: 3    famotidine (Pepcid) 20 MG tablet, Take 1 tablet by mouth 2 (Two) Times a Day As Needed for Heartburn., Disp: 60 tablet, Rfl: 2    gabapentin (NEURONTIN) 300 MG capsule, Take 1 capsule by mouth 3 (Three) Times a Day., Disp: , Rfl:     losartan (COZAAR) 25 MG tablet, Take 1 tablet by mouth Daily.,  Disp: 30 tablet, Rfl: 0    oxyCODONE-acetaminophen (PERCOCET) 5-325 MG per tablet, Take 1 tablet by mouth Every 6 (Six) Hours As Needed., Disp: , Rfl:     pantoprazole (Protonix) 40 MG EC tablet, Take 1 tablet by mouth Daily., Disp: 30 tablet, Rfl: 2    tamsulosin (FLOMAX) 0.4 MG capsule 24 hr capsule, Take 1 capsule by mouth Every Night for 360 days., Disp: 90 capsule, Rfl: 3    topiramate (TOPAMAX) 100 MG tablet, Take 125 mg by mouth 2 (Two) Times a Day., Disp: , Rfl:     traZODone (DESYREL) 50 MG tablet, Take 1-2 tabs nightly for sleep., Disp: 60 tablet, Rfl: 0    clonazePAM (KlonoPIN) 1 MG tablet, Take 1 tablet by mouth Daily. (Patient not taking: Reported on 6/19/2023), Disp: 30 tablet, Rfl: 0    levothyroxine (SYNTHROID, LEVOTHROID) 25 MCG tablet, Take 1 tablet by mouth Every Morning. (Patient not taking: Reported on 6/19/2023), Disp: 90 tablet, Rfl: 0    Current Facility-Administered Medications:     triamcinolone acetonide (KENALOG-40) injection 40 mg, 40 mg, Intramuscular, Once, Delilah Kulkarni, FRANKY    Past Medical History:   Diagnosis Date    Anxiety     Asthma     AS A CHILD     Balance disorder     Chiari I malformation     Cholelithiasis        Past Surgical History:   Procedure Laterality Date    NERVE DECOMPRESSION  2011    SHUNT REVISION         Social History     Socioeconomic History    Marital status:    Tobacco Use    Smoking status: Never    Smokeless tobacco: Never   Vaping Use    Vaping Use: Never used   Substance and Sexual Activity    Alcohol use: Not Currently    Drug use: Never    Sexual activity: Yes       Family History   Problem Relation Age of Onset    Chiari malformation Mother     Stroke Mother     Heart disease Mother     ADD / ADHD Father     Heart disease Father     Heart disease Brother     Thyroid disease Maternal Aunt     Heart disease Maternal Aunt     Hyperlipidemia Maternal Uncle     No Known Problems Paternal Aunt     ADD / ADHD Paternal Uncle     Heart disease  "Paternal Uncle     Diabetes Paternal Uncle     No Known Problems Maternal Grandmother     Heart disease Maternal Grandfather     Diabetes Paternal Grandmother     Heart disease Paternal Grandfather        Objective    Temp 97.6 °F (36.4 °C)   Ht 170.2 cm (67\")   Wt 99.6 kg (219 lb 9.6 oz)   BMI 34.39 kg/m²     Physical Exam        Results for orders placed or performed in visit on 06/19/23   POC Urinalysis Dipstick, Multipro    Specimen: Urine   Result Value Ref Range    Color Yellow Yellow, Straw, Dark Yellow, Lacie    Clarity, UA Clear Clear    Glucose, UA Negative Negative mg/dL    Bilirubin Negative Negative    Ketones, UA Negative Negative    Specific Gravity  1.020 1.005 - 1.030    Blood, UA Negative Negative    pH, Urine 7.0 5.0 - 8.0    Protein, POC 30 mg/dL (A) Negative mg/dL    Urobilinogen, UA 0.2 E.U./dL Normal, 0.2 E.U./dL    Nitrite, UA Negative Negative    Leukocytes Negative Negative     KUB independent review    A KUB is available for me to review today.  The image is inspected for a bowel gas pattern and the general bone structure of the spine and pelvis. The kidneys are then inspected closely.  Renal outline is noted if identifiable. The kidney, collecting system, and anticipated path of the ureter are examined for calcifications including those in the true pelvis.  This film reveals:    On the right there is a single mid-ureteral stone measuring 4 mm calcification is superimposed with the sacrum.    On the left there are no calcificaitons seen in the kidney or the expected course of the ureter.      Assessment and Plan    Diagnoses and all orders for this visit:    1. Personal history of kidney stones (Primary)  -     POC Urinalysis Dipstick, Multipro  -     XR Abdomen KUB; Future    2. Right ureteral stone  -     XR Abdomen KUB; Future    Patient with previous history of stones which he has passed on his own without intervention.  He developed nausea and worsening pain in his right lower back " radiating around to his right lower abdomen.  No fever or chills CT scan which I reviewed done 06/18/2023 at [facility] was positive for a 4 mm right mid to distal ureteral stone.  I looked at his KUB there is a 4 mm calcification superimposed with the right sacrum which I think is the stone seen on CT.  Serum creatinine did increase it was 1.8 from baseline however I explained that usually renal failure is temporary and not irreversible after a stone has passed or treated.  We did discuss treating with ureteroscopy at this point patient would like to see if he can pass the stone.  Gave him a strainer which he can use and then should he passed a stone he needs to bring it into his appointment so we can send for analysis.  I also told him if he should develop fever chills intractable nausea vomiting pain not relieved with pain medication or no urine output he needs to return here or or present to [facility] ER for further treatment.

## 2023-08-15 DIAGNOSIS — F51.01 PRIMARY INSOMNIA: ICD-10-CM

## 2023-08-15 RX ORDER — TRAZODONE HYDROCHLORIDE 50 MG/1
TABLET ORAL
Qty: 60 TABLET | Refills: 6 | Status: SHIPPED | OUTPATIENT
Start: 2023-08-15

## 2023-08-15 NOTE — TELEPHONE ENCOUNTER
Rx Refill Note  Requested Prescriptions     Pending Prescriptions Disp Refills    traZODone (DESYREL) 50 MG tablet [Pharmacy Med Name: traZODone HCl 50 MG Oral Tablet] 60 tablet 0     Sig: TAKE 1 TO 2 TABLETS BY MOUTH NIGHTLY FOR SLEEP      Last office visit with prescribing clinician: 6/26/2023   Last telemedicine visit with prescribing clinician: Visit date not found   Next office visit with prescribing clinician: 9/7/2023   {  Violeta Rose MA  08/15/23, 09:35 CDT

## 2023-08-18 ENCOUNTER — OFFICE VISIT (OUTPATIENT)
Dept: FAMILY MEDICINE CLINIC | Facility: CLINIC | Age: 28
End: 2023-08-18
Payer: COMMERCIAL

## 2023-08-18 VITALS
OXYGEN SATURATION: 94 % | SYSTOLIC BLOOD PRESSURE: 117 MMHG | WEIGHT: 217 LBS | HEART RATE: 81 BPM | TEMPERATURE: 98.5 F | HEIGHT: 67 IN | BODY MASS INDEX: 34.06 KG/M2 | DIASTOLIC BLOOD PRESSURE: 80 MMHG

## 2023-08-18 DIAGNOSIS — R51.9 PRESSURE IN HEAD: ICD-10-CM

## 2023-08-18 DIAGNOSIS — G91.9 HYDROCEPHALUS, UNSPECIFIED TYPE: ICD-10-CM

## 2023-08-18 DIAGNOSIS — J06.9 UPPER RESPIRATORY TRACT INFECTION, UNSPECIFIED TYPE: Primary | ICD-10-CM

## 2023-08-18 PROCEDURE — 99213 OFFICE O/P EST LOW 20 MIN: CPT | Performed by: NURSE PRACTITIONER

## 2023-08-18 PROCEDURE — 87428 SARSCOV & INF VIR A&B AG IA: CPT | Performed by: NURSE PRACTITIONER

## 2023-08-18 RX ORDER — AZITHROMYCIN 250 MG/1
TABLET, FILM COATED ORAL
Qty: 6 TABLET | Refills: 0 | Status: SHIPPED | OUTPATIENT
Start: 2023-08-18

## 2023-08-18 RX ORDER — GABAPENTIN 600 MG/1
600 TABLET ORAL 4 TIMES DAILY
COMMUNITY
Start: 2023-07-27

## 2023-08-18 RX ORDER — DIVALPROEX SODIUM 500 MG/1
500 TABLET, EXTENDED RELEASE ORAL 2 TIMES DAILY
COMMUNITY
Start: 2023-08-04

## 2023-08-18 NOTE — PROGRESS NOTES
"CC: cough and head pressure    History:  Curtis Garsia is a 27 y.o. male who presents today for evaluation of the above problems.      Dry Cough and congestion x 2 weeks. Wife has also been sick, but she recovered. He has not. Has taken a steroid dose pack and this did not help.     After spells of coughing will feel pressure in his head that lasts for several minutes. Notes that it \"feels like his brain is floating.\" Describes that after a few minutes he can feel the pressure recede and he develops a full body warm tingly sensation that ends in his hands and lasts for another several minutes. .     Cough  Associated symptoms include wheezing.   ROS:  Review of Systems   Respiratory:  Positive for cough and wheezing.    Neurological:  Positive for light-headedness and numbness.     No Known Allergies  Past Medical History:   Diagnosis Date    Anxiety     Asthma     AS A CHILD     Balance disorder     Chiari I malformation     Cholelithiasis      Past Surgical History:   Procedure Laterality Date    NERVE DECOMPRESSION  2011    SHUNT REVISION       Family History   Problem Relation Age of Onset    Chiari malformation Mother     Stroke Mother     Heart disease Mother     ADD / ADHD Father     Heart disease Father     Heart disease Brother     Thyroid disease Maternal Aunt     Heart disease Maternal Aunt     Hyperlipidemia Maternal Uncle     No Known Problems Paternal Aunt     ADD / ADHD Paternal Uncle     Heart disease Paternal Uncle     Diabetes Paternal Uncle     No Known Problems Maternal Grandmother     Heart disease Maternal Grandfather     Diabetes Paternal Grandmother     Heart disease Paternal Grandfather       reports that he has never smoked. He has never used smokeless tobacco. He reports that he does not currently use alcohol. He reports that he does not use drugs.      Current Outpatient Medications:     amphetamine-dextroamphetamine (ADDERALL) 5 MG tablet, Take 1 tablet by mouth Daily. Prescribed " "by psychologist, Disp: , Rfl:     clonazePAM (KlonoPIN) 0.5 MG tablet, Take 1 tablet by mouth 2 (Two) Times a Day., Disp: 60 tablet, Rfl: 0    divalproex (DEPAKOTE ER) 500 MG 24 hr tablet, Take 1 tablet by mouth 2 (Two) Times a Day., Disp: , Rfl:     DULoxetine (CYMBALTA) 60 MG capsule, Take 1 capsule by mouth 2 (Two) Times a Day., Disp: 90 capsule, Rfl: 3    famotidine (Pepcid) 20 MG tablet, Take 1 tablet by mouth 2 (Two) Times a Day As Needed for Heartburn., Disp: 60 tablet, Rfl: 2    gabapentin (NEURONTIN) 600 MG tablet, Take 1 tablet by mouth 4 (Four) Times a Day., Disp: , Rfl:     losartan (COZAAR) 25 MG tablet, Take 1 tablet by mouth Daily., Disp: 30 tablet, Rfl: 0    ondansetron (ZOFRAN) 4 MG tablet, TAKE 1 TABLET BY MOUTH 4 TIMES DAILY AS NEEDED FOR NAUSEA, Disp: , Rfl:     pantoprazole (Protonix) 40 MG EC tablet, Take 1 tablet by mouth Daily., Disp: 30 tablet, Rfl: 2    tamsulosin (FLOMAX) 0.4 MG capsule 24 hr capsule, Take 1 capsule by mouth Every Night for 360 days., Disp: 90 capsule, Rfl: 3    traZODone (DESYREL) 50 MG tablet, TAKE 1 TO 2 TABLETS BY MOUTH NIGHTLY FOR SLEEP, Disp: 60 tablet, Rfl: 6    azithromycin (Zithromax) 250 MG tablet, Take 2 tablets the first day, then 1 tablet daily for 4 days., Disp: 6 tablet, Rfl: 0    Current Facility-Administered Medications:     triamcinolone acetonide (KENALOG-40) injection 40 mg, 40 mg, Intramuscular, Once, Delilah Kulkarni, APRN    OBJECTIVE:  /80 (BP Location: Left arm, Patient Position: Sitting, Cuff Size: Adult)   Pulse 81   Temp 98.5 øF (36.9 øC) (Oral)   Ht 170.2 cm (67\")   Wt 98.4 kg (217 lb)   SpO2 94%   BMI 33.99 kg/mý    Physical Exam  Vitals reviewed.   Constitutional:       Appearance: He is well-developed.   Cardiovascular:      Rate and Rhythm: Normal rate and regular rhythm.      Heart sounds: Normal heart sounds.   Pulmonary:      Effort: Pulmonary effort is normal.      Breath sounds: Normal breath sounds.   Neurological:      " Mental Status: He is alert and oriented to person, place, and time.   Psychiatric:         Behavior: Behavior normal.       Assessment/Plan    Diagnoses and all orders for this visit:    1. Upper respiratory tract infection, unspecified type (Primary)  -     POCT SARS-CoV-2 Antigen ABI + Flu  -     azithromycin (Zithromax) 250 MG tablet; Take 2 tablets the first day, then 1 tablet daily for 4 days.  Dispense: 6 tablet; Refill: 0    2. Pressure in head  -     Cancel: CT Head Without Contrast; Future  -     CT Head Without Contrast; Future  -     CT Head Without Contrast    3. Hydrocephalus, unspecified type  -     CT Head Without Contrast; Future  -     CT Head Without Contrast    4. Class 1 obesity due to excess calories with serious comorbidity and body mass index (BMI) of 32.0 to 32.9 in adult    BMI is >= 30 and <35. (Class 1 Obesity). The following options were offered after discussion;: weight loss educational material (shared in after visit summary)     An After Visit Summary was printed and given to the patient at discharge.  Return if symptoms worsen or fail to improve, for Next scheduled follow up.       FRANKY Fitzgerald 8/18/23    Electronically signed.

## 2023-09-07 ENCOUNTER — OFFICE VISIT (OUTPATIENT)
Dept: FAMILY MEDICINE CLINIC | Facility: CLINIC | Age: 28
End: 2023-09-07
Payer: COMMERCIAL

## 2023-09-07 VITALS
TEMPERATURE: 98 F | SYSTOLIC BLOOD PRESSURE: 130 MMHG | WEIGHT: 221.4 LBS | HEART RATE: 81 BPM | BODY MASS INDEX: 34.75 KG/M2 | OXYGEN SATURATION: 96 % | DIASTOLIC BLOOD PRESSURE: 83 MMHG | HEIGHT: 67 IN

## 2023-09-07 DIAGNOSIS — K21.9 GASTROESOPHAGEAL REFLUX DISEASE, UNSPECIFIED WHETHER ESOPHAGITIS PRESENT: ICD-10-CM

## 2023-09-07 DIAGNOSIS — I10 PRIMARY HYPERTENSION: Primary | ICD-10-CM

## 2023-09-07 DIAGNOSIS — F32.A DEPRESSIVE DISORDER: ICD-10-CM

## 2023-09-07 DIAGNOSIS — F41.9 ANXIETY: ICD-10-CM

## 2023-09-07 DIAGNOSIS — E66.09 CLASS 1 OBESITY DUE TO EXCESS CALORIES WITH SERIOUS COMORBIDITY AND BODY MASS INDEX (BMI) OF 32.0 TO 32.9 IN ADULT: ICD-10-CM

## 2023-09-07 DIAGNOSIS — R53.83 FATIGUE, UNSPECIFIED TYPE: ICD-10-CM

## 2023-09-07 PROCEDURE — 99213 OFFICE O/P EST LOW 20 MIN: CPT | Performed by: NURSE PRACTITIONER

## 2023-09-07 RX ORDER — CLONAZEPAM 0.5 MG/1
0.5 TABLET ORAL 2 TIMES DAILY
Qty: 60 TABLET | Refills: 0 | Status: SHIPPED | OUTPATIENT
Start: 2023-09-07

## 2023-09-07 NOTE — PROGRESS NOTES
CC: 3 month check - controlled med monitoring     History:  Curtis Garsia is a 27 y.o. male who presents today for evaluation of the above problems.    Patient reports that he is doing well. He has no issues today. He is here for controlled medication monitoring of his clonazepam.   BP is appropriate.         5/2/2022     8:00 AM 11/4/2022     8:00 AM 2/3/2023     8:00 AM 9/7/2023     8:00 AM   CONTROLLED SUBSTANCE TRACKING   Last Gopal 5/2/2022 11/4/2022 2/3/2023 9/7/2023   Report Number reviewed through epic reviewed through [facility] reviewed through [facility] reviewed through [facility]   Last UDS 2/2/2022 3/30/2022 2/3/2023 2/3/2023   Last Controlled Substance Agreement 3/30/2022 2/22/2022 2/3/2023 2/3/2023             HPI  ROS:  Review of Systems   Respiratory:  Negative for shortness of breath.    Cardiovascular:  Negative for chest pain.   Psychiatric/Behavioral:  The patient is not nervous/anxious.      No Known Allergies  Past Medical History:   Diagnosis Date    Anxiety     Asthma     AS A CHILD     Balance disorder     Chiari I malformation     Cholelithiasis      Past Surgical History:   Procedure Laterality Date    NERVE DECOMPRESSION  2011    SHUNT REVISION       Family History   Problem Relation Age of Onset    Chiari malformation Mother     Stroke Mother     Heart disease Mother     ADD / ADHD Father     Heart disease Father     Heart disease Brother     Thyroid disease Maternal Aunt     Heart disease Maternal Aunt     Hyperlipidemia Maternal Uncle     No Known Problems Paternal Aunt     ADD / ADHD Paternal Uncle     Heart disease Paternal Uncle     Diabetes Paternal Uncle     No Known Problems Maternal Grandmother     Heart disease Maternal Grandfather     Diabetes Paternal Grandmother     Heart disease Paternal Grandfather       reports that he has never smoked. He has never used smokeless tobacco. He reports that he does not currently use alcohol. He reports that he does not use drugs.      Current  "Outpatient Medications:     amphetamine-dextroamphetamine (ADDERALL) 5 MG tablet, Take 1 tablet by mouth Daily. Prescribed by psychologist, Disp: , Rfl:     clonazePAM (KlonoPIN) 0.5 MG tablet, Take 1 tablet by mouth 2 (Two) Times a Day., Disp: 60 tablet, Rfl: 0    divalproex (DEPAKOTE ER) 500 MG 24 hr tablet, Take 1 tablet by mouth 2 (Two) Times a Day., Disp: , Rfl:     DULoxetine (CYMBALTA) 60 MG capsule, Take 1 capsule by mouth 2 (Two) Times a Day., Disp: 90 capsule, Rfl: 3    famotidine (Pepcid) 20 MG tablet, Take 1 tablet by mouth 2 (Two) Times a Day As Needed for Heartburn., Disp: 60 tablet, Rfl: 2    gabapentin (NEURONTIN) 600 MG tablet, Take 1 tablet by mouth 4 (Four) Times a Day., Disp: , Rfl:     losartan (COZAAR) 25 MG tablet, Take 1 tablet by mouth Daily., Disp: 30 tablet, Rfl: 0    ondansetron (ZOFRAN) 4 MG tablet, TAKE 1 TABLET BY MOUTH 4 TIMES DAILY AS NEEDED FOR NAUSEA, Disp: , Rfl:     pantoprazole (Protonix) 40 MG EC tablet, Take 1 tablet by mouth Daily., Disp: 30 tablet, Rfl: 2    tamsulosin (FLOMAX) 0.4 MG capsule 24 hr capsule, Take 1 capsule by mouth Every Night for 360 days., Disp: 90 capsule, Rfl: 3    traZODone (DESYREL) 50 MG tablet, TAKE 1 TO 2 TABLETS BY MOUTH NIGHTLY FOR SLEEP, Disp: 60 tablet, Rfl: 6    Current Facility-Administered Medications:     triamcinolone acetonide (KENALOG-40) injection 40 mg, 40 mg, Intramuscular, Once, Delilah Kulkarni, APRN    OBJECTIVE:  /83 (BP Location: Left arm, Patient Position: Sitting, Cuff Size: Adult)   Pulse 81   Temp 98 °F (36.7 °C) (Temporal)   Ht 170.2 cm (67\")   Wt 100 kg (221 lb 6.4 oz)   SpO2 96%   BMI 34.68 kg/m²    Physical Exam  Vitals reviewed.   Constitutional:       Appearance: He is well-developed.   HENT:      Right Ear: Tympanic membrane, ear canal and external ear normal.   Cardiovascular:      Rate and Rhythm: Normal rate and regular rhythm.      Heart sounds: Normal heart sounds.   Pulmonary:      Effort: Pulmonary " effort is normal.      Breath sounds: Normal breath sounds.   Neurological:      Mental Status: He is alert and oriented to person, place, and time.   Psychiatric:         Behavior: Behavior normal.       Assessment/Plan    Diagnoses and all orders for this visit:    1. Primary hypertension (Primary)  -     Comprehensive Metabolic Panel    2. Gastroesophageal reflux disease, unspecified whether esophagitis present    3. Anxiety  -     clonazePAM (KlonoPIN) 0.5 MG tablet; Take 1 tablet by mouth 2 (Two) Times a Day.  Dispense: 60 tablet; Refill: 0    4. Class 1 obesity due to excess calories with serious comorbidity and body mass index (BMI) of 32.0 to 32.9 in adult  -     Lipid Panel    5. Depressive disorder    6. Fatigue, unspecified type  -     Comprehensive Metabolic Panel  -     TSH    Gopal reviewed and appropriate.  Contract and UDS up-to-date.          An After Visit Summary was printed and given to the patient at discharge.  Return in about 3 months (around 12/7/2023), or if symptoms worsen or fail to improve, for Next scheduled follow up.       FRANKY Fitzgerald 9/7/23    Electronically signed.

## 2023-09-08 LAB
ALBUMIN SERPL-MCNC: 4.6 G/DL (ref 3.5–5.2)
ALBUMIN/GLOB SERPL: 1.6 G/DL
ALP SERPL-CCNC: 104 U/L (ref 39–117)
ALT SERPL-CCNC: 13 U/L (ref 1–41)
AST SERPL-CCNC: 14 U/L (ref 1–40)
BILIRUB SERPL-MCNC: 0.2 MG/DL (ref 0–1.2)
BUN SERPL-MCNC: 14 MG/DL (ref 6–20)
BUN/CREAT SERPL: 19.7 (ref 7–25)
CALCIUM SERPL-MCNC: 9.5 MG/DL (ref 8.6–10.5)
CHLORIDE SERPL-SCNC: 103 MMOL/L (ref 98–107)
CHOLEST SERPL-MCNC: 195 MG/DL (ref 0–200)
CO2 SERPL-SCNC: 24 MMOL/L (ref 22–29)
CREAT SERPL-MCNC: 0.71 MG/DL (ref 0.76–1.27)
EGFRCR SERPLBLD CKD-EPI 2021: 127.4 ML/MIN/1.73
GLOBULIN SER CALC-MCNC: 2.9 GM/DL
GLUCOSE SERPL-MCNC: 98 MG/DL (ref 65–99)
HDLC SERPL-MCNC: 49 MG/DL (ref 40–60)
LDLC SERPL CALC-MCNC: 105 MG/DL (ref 0–100)
POTASSIUM SERPL-SCNC: 4 MMOL/L (ref 3.5–5.2)
PROT SERPL-MCNC: 7.5 G/DL (ref 6–8.5)
SODIUM SERPL-SCNC: 142 MMOL/L (ref 136–145)
TRIGL SERPL-MCNC: 242 MG/DL (ref 0–150)
TSH SERPL DL<=0.005 MIU/L-ACNC: 4.43 UIU/ML (ref 0.27–4.2)
VLDLC SERPL CALC-MCNC: 41 MG/DL (ref 5–40)

## 2023-09-14 ENCOUNTER — OFFICE VISIT (OUTPATIENT)
Dept: FAMILY MEDICINE CLINIC | Facility: CLINIC | Age: 28
End: 2023-09-14
Payer: MEDICARE

## 2023-09-14 VITALS
HEART RATE: 80 BPM | WEIGHT: 219.6 LBS | DIASTOLIC BLOOD PRESSURE: 86 MMHG | TEMPERATURE: 98.7 F | SYSTOLIC BLOOD PRESSURE: 130 MMHG | HEIGHT: 67 IN | BODY MASS INDEX: 34.47 KG/M2 | OXYGEN SATURATION: 98 %

## 2023-09-14 DIAGNOSIS — M54.2 CERVICALGIA: Primary | ICD-10-CM

## 2023-09-14 RX ORDER — PREDNISONE 10 MG/1
TABLET ORAL
Qty: 21 TABLET | Refills: 0 | Status: SHIPPED | OUTPATIENT
Start: 2023-09-14

## 2023-09-14 RX ORDER — TRIAMCINOLONE ACETONIDE 40 MG/ML
40 INJECTION, SUSPENSION INTRA-ARTICULAR; INTRAMUSCULAR ONCE
Status: COMPLETED | OUTPATIENT
Start: 2023-09-14 | End: 2023-09-14

## 2023-09-14 RX ORDER — RIMEGEPANT SULFATE 75 MG/75MG
TABLET, ORALLY DISINTEGRATING ORAL
COMMUNITY
Start: 2023-09-07

## 2023-09-14 RX ADMIN — TRIAMCINOLONE ACETONIDE 40 MG: 40 INJECTION, SUSPENSION INTRA-ARTICULAR; INTRAMUSCULAR at 12:47

## 2023-09-14 NOTE — PROGRESS NOTES
CC: neck/back pain    History:  Curtis Garsia is a 27 y.o. male who presents today for evaluation of the above problems.      Loki woke up this monring with pain between his shoulder blades going up into his neck. Pain is worst with tilting his head up or down. Side to side movement and turning of head does not cause pain. No known injury, but states that his neck has been burning in the location of his shunt tubing, down the right side of his neck.     HPI  ROS:  Review of Systems   Musculoskeletal:  Positive for back pain and neck pain.     No Known Allergies  Past Medical History:   Diagnosis Date    Anxiety     Asthma     AS A CHILD     Balance disorder     Chiari I malformation     Cholelithiasis      Past Surgical History:   Procedure Laterality Date    NERVE DECOMPRESSION  2011    SHUNT REVISION       Family History   Problem Relation Age of Onset    Chiari malformation Mother     Stroke Mother     Heart disease Mother     ADD / ADHD Father     Heart disease Father     Heart disease Brother     Thyroid disease Maternal Aunt     Heart disease Maternal Aunt     Hyperlipidemia Maternal Uncle     No Known Problems Paternal Aunt     ADD / ADHD Paternal Uncle     Heart disease Paternal Uncle     Diabetes Paternal Uncle     No Known Problems Maternal Grandmother     Heart disease Maternal Grandfather     Diabetes Paternal Grandmother     Heart disease Paternal Grandfather       reports that he has never smoked. He has never used smokeless tobacco. He reports that he does not currently use alcohol. He reports that he does not use drugs.      Current Outpatient Medications:     amphetamine-dextroamphetamine (ADDERALL) 5 MG tablet, Take 1 tablet by mouth Daily. Prescribed by psychologist, Disp: , Rfl:     clonazePAM (KlonoPIN) 0.5 MG tablet, Take 1 tablet by mouth 2 (Two) Times a Day., Disp: 60 tablet, Rfl: 0    divalproex (DEPAKOTE ER) 500 MG 24 hr tablet, Take 1 tablet by mouth 2 (Two) Times a Day., Disp: ,  "Rfl:     DULoxetine (CYMBALTA) 60 MG capsule, Take 1 capsule by mouth 2 (Two) Times a Day., Disp: 90 capsule, Rfl: 3    famotidine (Pepcid) 20 MG tablet, Take 1 tablet by mouth 2 (Two) Times a Day As Needed for Heartburn., Disp: 60 tablet, Rfl: 2    gabapentin (NEURONTIN) 600 MG tablet, Take 1 tablet by mouth 4 (Four) Times a Day., Disp: , Rfl:     losartan (COZAAR) 25 MG tablet, Take 1 tablet by mouth Daily., Disp: 30 tablet, Rfl: 0    Nurtec 75 MG dispersible tablet, Take by oral route for 30 days., Disp: , Rfl:     ondansetron (ZOFRAN) 4 MG tablet, TAKE 1 TABLET BY MOUTH 4 TIMES DAILY AS NEEDED FOR NAUSEA, Disp: , Rfl:     pantoprazole (Protonix) 40 MG EC tablet, Take 1 tablet by mouth Daily., Disp: 30 tablet, Rfl: 2    tamsulosin (FLOMAX) 0.4 MG capsule 24 hr capsule, Take 1 capsule by mouth Every Night for 360 days., Disp: 90 capsule, Rfl: 3    traZODone (DESYREL) 50 MG tablet, TAKE 1 TO 2 TABLETS BY MOUTH NIGHTLY FOR SLEEP, Disp: 60 tablet, Rfl: 6    predniSONE (DELTASONE) 10 MG (21) dose pack, Use as directed on package, Disp: 21 tablet, Rfl: 0    Current Facility-Administered Medications:     triamcinolone acetonide (KENALOG-40) injection 40 mg, 40 mg, Intramuscular, Once, Delilah Kulkarni APRN    triamcinolone acetonide (KENALOG-40) injection 40 mg, 40 mg, Intramuscular, Once, Delilah Kulkarni APRN    OBJECTIVE:  /86 (BP Location: Left arm, Patient Position: Sitting, Cuff Size: Adult)   Pulse 80   Temp 98.7 °F (37.1 °C) (Temporal)   Ht 170.2 cm (67\")   Wt 99.6 kg (219 lb 9.6 oz)   SpO2 98%   BMI 34.39 kg/m²    Physical Exam  Vitals reviewed.   Constitutional:       Appearance: He is well-developed.   Cardiovascular:      Rate and Rhythm: Normal rate.   Pulmonary:      Effort: Pulmonary effort is normal.   Musculoskeletal:      Cervical back: Tenderness present. No swelling, edema, signs of trauma or rigidity. Decreased range of motion.        Back:    Neurological:      Mental Status: He is " alert and oriented to person, place, and time.   Psychiatric:         Behavior: Behavior normal.       Assessment/Plan    Diagnoses and all orders for this visit:    1. Cervicalgia (Primary)  -     CT Head Without Contrast; Future  -     triamcinolone acetonide (KENALOG-40) injection 40 mg  -     predniSONE (DELTASONE) 10 MG (21) dose pack; Use as directed on package  Dispense: 21 tablet; Refill: 0    Encouraged low heat. Follow up if no improvement in 2 weeks.     An After Visit Summary was printed and given to the patient at discharge.  Return in about 2 weeks (around 9/28/2023) for Annual physical.       FRANKY Fitzgerald 9/14/23    Electronically signed.

## 2023-09-15 ENCOUNTER — DOCUMENTATION (OUTPATIENT)
Dept: FAMILY MEDICINE CLINIC | Facility: CLINIC | Age: 28
End: 2023-09-15
Payer: MEDICARE

## 2023-09-28 NOTE — PROGRESS NOTES
Chief Complaint  Palpitations (18mo F/U)    Subjective          Curtis Garsia's visit today is for routine follow-up. He has hypertension, hydrocephalus status post  shunt 5/21, Chiari malformation, anxiety and obesity.  He continues to complain of dyspnea with mild to moderate exertion and intermittent palpitations.  Patient denies chest pain, dizziness, syncope, orthopnea, PND, edema or decreased stamina.  Patient denies any signs of bleeding.    Palpitations   This is a recurrent problem. The current episode started more than 1 month ago. The problem occurs intermittently. The problem has been unchanged. Associated symptoms include anxiety and shortness of breath. Pertinent negatives include no chest fullness, chest pain, coughing, diaphoresis, dizziness, fever, irregular heartbeat, malaise/fatigue, nausea, near-syncope, numbness, syncope, vomiting or weakness. Risk factors include obesity. His past medical history is significant for anxiety.   Shortness of Breath  This is a recurrent problem. The current episode started more than 1 month ago. The problem occurs intermittently. Pertinent negatives include no abdominal pain, chest pain, claudication, coryza, ear pain, fever, headaches, hemoptysis, leg pain, leg swelling, neck pain, orthopnea, PND, rash, rhinorrhea, sore throat, sputum production, swollen glands, syncope, vomiting or wheezing. The symptoms are aggravated by any activity.   Hypertension  This is a recurrent problem. The current episode started 1 to 4 weeks ago. The problem is unchanged. The problem is uncontrolled. Associated symptoms include anxiety, palpitations and shortness of breath. Pertinent negatives include no blurred vision, chest pain, headaches, malaise/fatigue, neck pain, orthopnea, peripheral edema, PND or sweats. Risk factors for coronary artery disease include male gender and obesity. Past treatments include nothing. Current antihypertension treatment includes nothing. The  "current treatment provides no improvement. There are no compliance problems.      I have reviewed and confirmed the accuracy of the ROS  FRANKY Christianson      Objective   Vital Signs:   /85   Pulse 88   Ht 170.2 cm (67\")   Wt 100 kg (221 lb)   SpO2 98%   BMI 34.61 kg/m²     Vitals and nursing note reviewed.   Constitutional:       General: Awake.      Appearance: Normal and healthy appearance. Well-developed and not in distress. Obese.   Eyes:      General: Lids are normal.      Conjunctiva/sclera: Conjunctivae normal.      Pupils: Pupils are equal, round, and reactive to light.   HENT:      Head: Normocephalic and atraumatic.      Nose: Nose normal.   Neck:      Vascular: No JVR. JVD normal.   Pulmonary:      Effort: Pulmonary effort is normal.      Breath sounds: Normal breath sounds. No wheezing. No rhonchi. No rales.   Chest:      Chest wall: Not tender to palpatation.   Cardiovascular:      PMI at left midclavicular line. Normal rate. Regular rhythm. Normal S1. Normal S2.       Murmurs: There is no murmur.      No gallop.  No click. No rub.   Pulses:     Intact distal pulses.   Edema:     Peripheral edema absent.   Abdominal:      General: Bowel sounds are normal.      Palpations: Abdomen is soft.      Tenderness: There is no abdominal tenderness.   Musculoskeletal: Normal range of motion.         General: No tenderness.      Cervical back: Normal range of motion. Skin:     General: Skin is warm and dry.   Neurological:      General: No focal deficit present.      Mental Status: Alert, oriented to person, place, and time and oriented to person, place and time.   Psychiatric:         Attention and Perception: Attention and perception normal.         Mood and Affect: Mood and affect normal.         Speech: Speech normal.         Behavior: Behavior normal. Behavior is cooperative.         Thought Content: Thought content normal.         Cognition and Memory: Cognition and memory normal.         " Judgment: Judgment normal.      Result Review :   The following data was reviewed by: FRANKY Christianson on 10/02/2023:  Common labs          2/3/2023    07:41 9/7/2023    07:25   Common Labs   Glucose 98  98    BUN 14  14    Creatinine 1.25  0.71    Sodium 142  142    Potassium 4.1  4.0    Chloride 107  103    Calcium 9.8  9.5    Total Protein 7.6  7.5    Albumin 5.0  4.6    Total Bilirubin 0.3  0.2    Alkaline Phosphatase 138  104    AST (SGOT) 18  14    ALT (SGPT) 23  13    WBC 9.67     Hemoglobin 13.7     Hematocrit 41.7     Platelets 448     Total Cholesterol 180  195    Triglycerides 104  242    HDL Cholesterol 46  49    LDL Cholesterol  115  105    Data reviewed: Cardiology studies holter monitor 3/20/22 and 2d echo 3/4/22 and CT angiogram of the coronary arteries 4/5/2022.      ECG 12 Lead    Date/Time: 10/2/2023 12:03 PM  Performed by: Augustina Pearson APRN  Authorized by: Augustina Pearson APRN   Comparison: compared with previous ECG from 2/2/2022  Similar to previous ECG  Rhythm: sinus rhythm  Rate: normal  BPM: 88  Q waves: III and aVF    QRS axis: normal    Clinical impression: abnormal EKG          Assessment and Plan    Diagnoses and all orders for this visit:    1. Palpitations (Primary)- no correlated arrhythmia on most recent holter monitor.  Stable.    2. JOSHI (dyspnea on exertion)- no significant arrhythmia on recent holter monitor. No structural signs of heart failure on 2d echo.  Normal CT angiogram of coronary arteries 4/5/2022.     3. Class 1 obesity due to excess calories with serious comorbidity and body mass index (BMI) of 34.0 to 34.9 in adult- Patient's Body mass index is 34.61 kg/m². indicating that he is obese (BMI >30). Obesity-related health conditions include the following: none. Obesity is unchanged. BMI is is above average; no BMI management plan is appropriate. We discussed low calorie, low carb based diet program, portion control and increasing exercise.    4. Primary  hypertension-blood pressure is mildly elevated in office today, however pt states he has not taken his medication today.  Continue losartan.  Monitor and record daily blood pressure. Report readings consistently higher than 130/80 or consistently lower than 100/60.       Follow Up   Return in about 1 year (around 10/2/2024) for Next scheduled follow up.  Patient was given instructions and counseling regarding his condition or for health maintenance advice. Please see specific information pulled into the AVS if appropriate.

## 2023-10-02 ENCOUNTER — OFFICE VISIT (OUTPATIENT)
Dept: CARDIOLOGY | Facility: CLINIC | Age: 28
End: 2023-10-02
Payer: COMMERCIAL

## 2023-10-02 VITALS
WEIGHT: 221 LBS | OXYGEN SATURATION: 98 % | DIASTOLIC BLOOD PRESSURE: 85 MMHG | HEIGHT: 67 IN | HEART RATE: 88 BPM | SYSTOLIC BLOOD PRESSURE: 130 MMHG | BODY MASS INDEX: 34.69 KG/M2

## 2023-10-02 DIAGNOSIS — R06.09 DOE (DYSPNEA ON EXERTION): ICD-10-CM

## 2023-10-02 DIAGNOSIS — E66.09 CLASS 1 OBESITY DUE TO EXCESS CALORIES WITH SERIOUS COMORBIDITY AND BODY MASS INDEX (BMI) OF 34.0 TO 34.9 IN ADULT: ICD-10-CM

## 2023-10-02 DIAGNOSIS — R00.2 PALPITATIONS: Primary | ICD-10-CM

## 2023-10-02 DIAGNOSIS — I10 PRIMARY HYPERTENSION: ICD-10-CM

## 2023-10-02 PROCEDURE — 99214 OFFICE O/P EST MOD 30 MIN: CPT | Performed by: NURSE PRACTITIONER

## 2023-10-02 PROCEDURE — 1159F MED LIST DOCD IN RCRD: CPT | Performed by: NURSE PRACTITIONER

## 2023-10-02 PROCEDURE — 93000 ELECTROCARDIOGRAM COMPLETE: CPT | Performed by: NURSE PRACTITIONER

## 2023-10-02 PROCEDURE — 3079F DIAST BP 80-89 MM HG: CPT | Performed by: NURSE PRACTITIONER

## 2023-10-02 PROCEDURE — 3075F SYST BP GE 130 - 139MM HG: CPT | Performed by: NURSE PRACTITIONER

## 2023-10-02 PROCEDURE — 1160F RVW MEDS BY RX/DR IN RCRD: CPT | Performed by: NURSE PRACTITIONER

## 2023-10-02 RX ORDER — UBROGEPANT 100 MG/1
TABLET ORAL
COMMUNITY
Start: 2023-09-27

## 2023-10-02 RX ORDER — LOSARTAN POTASSIUM 25 MG/1
25 TABLET ORAL DAILY
Qty: 90 TABLET | Refills: 3 | Status: SHIPPED | OUTPATIENT
Start: 2023-10-02

## 2023-10-05 NOTE — PROGRESS NOTES
"Subjective      is 27 y.o. male    Chief Complaint: Urinary Hesitancy     History of Present Illness    27-year-old male established patient in for worsening complaint of urinary hesitancy and intermittency.  Patient reporting at times it feels as though he is \"urinating through a cannot water hose\". Remains on flomax 0.4mg. reports overall has noted improvement since starting flomax however over the past month urination has seemed to worsen. Pt denies any weakened stream once gets going.    Hx of kidney stones of which last passed a 4mm right ureteral stone.tissue pathology revealed calcium oxalate and calcium phosphate.      The following portions of the patient's history were reviewed and updated as appropriate: allergies, current medications, past family history, past medical history, past social history, past surgical history and problem list.    Review of Systems   Constitutional:  Negative for chills, fatigue and fever.   Gastrointestinal:  Negative for nausea and vomiting.   Genitourinary:  Positive for decreased urine volume and difficulty urinating.         Current Outpatient Medications:     amphetamine-dextroamphetamine (ADDERALL) 5 MG tablet, Take 1 tablet by mouth Daily. Prescribed by psychologist, Disp: , Rfl:     clonazePAM (KlonoPIN) 0.5 MG tablet, Take 1 tablet by mouth 2 (Two) Times a Day., Disp: 60 tablet, Rfl: 0    divalproex (DEPAKOTE ER) 500 MG 24 hr tablet, Take 3 tablets by mouth Daily., Disp: , Rfl:     DULoxetine (CYMBALTA) 60 MG capsule, Take 1 capsule by mouth 2 (Two) Times a Day., Disp: 90 capsule, Rfl: 3    famotidine (Pepcid) 20 MG tablet, Take 1 tablet by mouth 2 (Two) Times a Day As Needed for Heartburn., Disp: 60 tablet, Rfl: 2    gabapentin (NEURONTIN) 600 MG tablet, Take 1 tablet by mouth 4 (Four) Times a Day., Disp: , Rfl:     losartan (COZAAR) 25 MG tablet, Take 1 tablet by mouth Daily., Disp: 90 tablet, Rfl: 3    pantoprazole (Protonix) 40 MG EC tablet, Take 1 tablet " "by mouth Daily., Disp: 30 tablet, Rfl: 2    tamsulosin (FLOMAX) 0.4 MG capsule 24 hr capsule, Take 1 capsule by mouth Every Night for 360 days., Disp: 90 capsule, Rfl: 3    traZODone (DESYREL) 50 MG tablet, TAKE 1 TO 2 TABLETS BY MOUTH NIGHTLY FOR SLEEP, Disp: 60 tablet, Rfl: 6    Ubrelvy 100 MG tablet, TAKE 1 TABLET BY MOUTH AT ONSET OF HEADACHE, Disp: , Rfl:     Current Facility-Administered Medications:     triamcinolone acetonide (KENALOG-40) injection 40 mg, 40 mg, Intramuscular, Once, Delilah Kulkarni, APRN    Past Medical History:   Diagnosis Date    Anxiety     Asthma     AS A CHILD     Balance disorder     Chiari I malformation     Cholelithiasis        Past Surgical History:   Procedure Laterality Date    NERVE DECOMPRESSION  2011    SHUNT REVISION         Social History     Socioeconomic History    Marital status:    Tobacco Use    Smoking status: Never    Smokeless tobacco: Never   Vaping Use    Vaping Use: Never used   Substance and Sexual Activity    Alcohol use: Not Currently    Drug use: Never    Sexual activity: Yes       Family History   Problem Relation Age of Onset    Chiari malformation Mother     Stroke Mother     Heart disease Mother     ADD / ADHD Father     Heart disease Father     Heart disease Brother     Thyroid disease Maternal Aunt     Heart disease Maternal Aunt     Hyperlipidemia Maternal Uncle     No Known Problems Paternal Aunt     ADD / ADHD Paternal Uncle     Heart disease Paternal Uncle     Diabetes Paternal Uncle     No Known Problems Maternal Grandmother     Heart disease Maternal Grandfather     Diabetes Paternal Grandmother     Heart disease Paternal Grandfather        Objective    Temp 97.1 øF (36.2 øC)   Ht 172.7 cm (68\")   Wt 102 kg (223 lb 12.8 oz)   BMI 34.03 kg/mý     Physical Exam  Constitutional:       Appearance: Normal appearance.   Abdominal:      Tenderness: There is no right CVA tenderness or left CVA tenderness.   Skin:     General: Skin is warm and " dry.   Neurological:      Mental Status: He is alert and oriented to person, place, and time.   Psychiatric:         Mood and Affect: Mood normal.         Behavior: Behavior normal.             Results for orders placed or performed in visit on 10/11/23   POC Urinalysis Dipstick, Multipro    Specimen: Urine   Result Value Ref Range    Color Yellow Yellow, Straw, Dark Yellow, Lacie    Clarity, UA Clear Clear    Glucose, UA Negative Negative mg/dL    Bilirubin Negative Negative    Ketones, UA Trace (A) Negative    Specific Gravity  1.030 1.005 - 1.030    Blood, UA Negative Negative    pH, Urine 6.0 5.0 - 8.0    Protein, POC Trace (A) Negative mg/dL    Urobilinogen, UA 0.2 E.U./dL Normal, 0.2 E.U./dL    Nitrite, UA Negative Negative    Leukocytes Negative Negative     Estimation of residual urine via abdominal ultrasound  Residual Urine: 83 ml  Indication: Urinary Hesitancy   Position: Supine  Examination: Incremental scanning of the suprapubic area using 3 MHz transducer using copious amounts of acoustic gel.   Findings: An anechoic area was demonstrated which represented the bladder, with measurement of residual urine as noted. I inspected this myself. In that the residual urine was stable or insignificant, no treatment will be necessary at this time.     KUB independent review    A KUB is available for me to review today.  The image is inspected for a bowel gas pattern and the general bone structure of the spine and pelvis. The kidneys are then inspected closely.  Renal outline is noted if identifiable. The kidney, collecting system, and anticipated path of the ureter are examined for calcifications including those in the true pelvis.  This film reveals:    On the right there are no calcificaitons seen in the kidney or the expected course of the ureter.     On the left there are no calcificaitons seen in the kidney or the expected course of the ureter. .      Assessment and Plan    Diagnoses and all orders for this  "visit:    1. Urinary hesitancy (Primary)  -     POC Urinalysis Dipstick, Multipro    2. Right ureteral stone  -     POC Urinalysis Dipstick, Multipro      27-year-old male established patient in for worsening complaint of urinary hesitancy and intermittency.  Patient reporting at times it feels as though he is \"urinating through a cannot water hose\". Remains on flomax 0.4mg. reports overall has noted improvement since starting flomax however over the past month urination has seemed to worsen. Pt denies any weakened stream once gets going.    Recommend trying to increase flomax to twice daily dosing if tolerable and if no improvement recommend cystoscopy for further eval    F/u in 3 months       "

## 2023-10-11 ENCOUNTER — HOSPITAL ENCOUNTER (OUTPATIENT)
Dept: GENERAL RADIOLOGY | Facility: HOSPITAL | Age: 28
Discharge: HOME | End: 2023-10-11
Payer: MEDICARE

## 2023-10-11 ENCOUNTER — OFFICE VISIT (OUTPATIENT)
Dept: UROLOGY | Facility: CLINIC | Age: 28
End: 2023-10-11
Payer: COMMERCIAL

## 2023-10-11 VITALS — WEIGHT: 223.8 LBS | HEIGHT: 68 IN | BODY MASS INDEX: 33.92 KG/M2 | TEMPERATURE: 97.1 F

## 2023-10-11 DIAGNOSIS — N20.1 RIGHT URETERAL STONE: ICD-10-CM

## 2023-10-11 DIAGNOSIS — Z87.442 PERSONAL HISTORY OF KIDNEY STONES: ICD-10-CM

## 2023-10-11 DIAGNOSIS — R39.11 URINARY HESITANCY: Primary | ICD-10-CM

## 2023-10-11 LAB
BILIRUB BLD-MCNC: NEGATIVE MG/DL
CLARITY, POC: CLEAR
COLOR UR: YELLOW
GLUCOSE UR STRIP-MCNC: NEGATIVE MG/DL
KETONES UR QL: ABNORMAL
LEUKOCYTE EST, POC: NEGATIVE
NITRITE UR-MCNC: NEGATIVE MG/ML
PH UR: 6 [PH] (ref 5–8)
PROT UR STRIP-MCNC: ABNORMAL MG/DL
RBC # UR STRIP: NEGATIVE /UL
SP GR UR: 1.03 (ref 1–1.03)
UROBILINOGEN UR QL: ABNORMAL

## 2023-10-11 PROCEDURE — 74018 RADEX ABDOMEN 1 VIEW: CPT

## 2023-10-30 DIAGNOSIS — F41.9 ANXIETY: ICD-10-CM

## 2023-10-30 RX ORDER — CLONAZEPAM 0.5 MG/1
0.5 TABLET ORAL 2 TIMES DAILY
Qty: 60 TABLET | Refills: 0 | Status: SHIPPED | OUTPATIENT
Start: 2023-10-30

## 2023-10-30 NOTE — TELEPHONE ENCOUNTER
Rx Refill Note  Requested Prescriptions     Pending Prescriptions Disp Refills    clonazePAM (KlonoPIN) 0.5 MG tablet [Pharmacy Med Name: clonazePAM 0.5 MG Oral Tablet] 60 tablet 0     Sig: Take 1 tablet by mouth twice daily      Last office visit with prescribing clinician: 9/14/2023   Last telemedicine visit with prescribing clinician: Visit date not found   Next office visit with prescribing clinician: 3/4/2024     controlled med appt 9/7/23  contract 2/3/23  UDS 2/3/23                      Would you like a call back once the refill request has been completed: [] Yes [] No    If the office needs to give you a call back, can they leave a voicemail: [] Yes [] No    Violeta Cruz MA  10/30/23, 12:54 CDT

## 2023-12-23 DIAGNOSIS — F41.9 ANXIETY: ICD-10-CM

## 2023-12-26 RX ORDER — CLONAZEPAM 0.5 MG/1
0.5 TABLET ORAL 2 TIMES DAILY
Qty: 60 TABLET | Refills: 0 | OUTPATIENT
Start: 2023-12-26

## 2023-12-29 DIAGNOSIS — F41.9 ANXIETY: ICD-10-CM

## 2023-12-29 RX ORDER — CLONAZEPAM 0.5 MG/1
0.5 TABLET ORAL 2 TIMES DAILY
Qty: 10 TABLET | Refills: 0 | Status: SHIPPED | OUTPATIENT
Start: 2023-12-29

## 2023-12-29 NOTE — TELEPHONE ENCOUNTER
Rx Refill Note  Requested Prescriptions     Pending Prescriptions Disp Refills    clonazePAM (KlonoPIN) 0.5 MG tablet 60 tablet 0     Sig: Take 1 tablet by mouth 2 (Two) Times a Day.      Last office visit with prescribing clinician: 9/14/2023   Last telemedicine visit with prescribing clinician: Visit date not found   Next office visit with prescribing clinician: 1/2/2024     controlled med appt 9/7/23  contract 2/3/23  UDS 2/3/23    Made appt for patient for 01/02/2024 since there are no openings today.  Will you fill him until Tuesday                       Would you like a call back once the refill request has been completed: [] Yes [] No    If the office needs to give you a call back, can they leave a voicemail: [] Yes [] No    Violeta Cruz MA  12/29/23, 10:48 CST

## 2023-12-29 NOTE — PROGRESS NOTES
Subjective    Mr. Garsia is 28 y.o. male    Chief Complaint: urine hesitancy, and hx kidney stones    History of Present Illness    28-year-old male established patient in with for follow-up regarding ongoing issues with urine hesitancy and intermittency.  Was previously started on tamsulosin 0.4 mg to see if would be any benefit later encouraged to try to increase the dosage to 2 tablets.  Patient presents at this time stating that overall symptoms have improved is now only feeling the urge occasionally needing to push at the end of the urine stream to expel the remaining urine.  Reports it was an every time occurrence and now is only occurring every other day.    Hx of kidney stones of which last passed a 4mm right ureteral stone.tissue pathology revealed calcium oxalate and calcium phosphate.  Denies any flank pain or hematuria.  KUB today no stones visualized.    History of Chiari malformation, shunt placement    The following portions of the patient's history were reviewed and updated as appropriate: allergies, current medications, past family history, past medical history, past social history, past surgical history and problem list.    Review of Systems   Constitutional:  Negative for chills and fever.   Gastrointestinal:  Negative for abdominal pain, anal bleeding, blood in stool, nausea and vomiting.   Genitourinary:  Positive for difficulty urinating. Negative for dysuria and hematuria.         Current Outpatient Medications:     amphetamine-dextroamphetamine (ADDERALL) 5 MG tablet, Take 1 tablet by mouth Daily. Prescribed by psychologist, Disp: , Rfl:     citalopram (CeleXA) 10 MG tablet, , Disp: , Rfl:     clonazePAM (KlonoPIN) 0.5 MG tablet, Take 1 tablet by mouth 2 (Two) Times a Day., Disp: 60 tablet, Rfl: 0    divalproex (DEPAKOTE ER) 500 MG 24 hr tablet, Take 2 tablets by mouth Daily., Disp: , Rfl:     DULoxetine (CYMBALTA) 60 MG capsule, Take 1 capsule by mouth 2 (Two) Times a Day., Disp: 90 capsule,  Rfl: 3    famotidine (Pepcid) 20 MG tablet, Take 1 tablet by mouth 2 (Two) Times a Day As Needed for Heartburn., Disp: 60 tablet, Rfl: 2    gabapentin (NEURONTIN) 600 MG tablet, Take 1 tablet by mouth 4 (Four) Times a Day., Disp: , Rfl:     losartan (COZAAR) 25 MG tablet, Take 1 tablet by mouth Daily., Disp: 90 tablet, Rfl: 3    pantoprazole (Protonix) 40 MG EC tablet, Take 1 tablet by mouth Daily., Disp: 30 tablet, Rfl: 2    tamsulosin (FLOMAX) 0.4 MG capsule 24 hr capsule, Take 1 capsule by mouth 2 (Two) Times a Day for 360 days., Disp: 180 capsule, Rfl: 3    traZODone (DESYREL) 50 MG tablet, TAKE 1 TO 2 TABLETS BY MOUTH NIGHTLY FOR SLEEP, Disp: 60 tablet, Rfl: 6    Ubrelvy 100 MG tablet, TAKE 1 TABLET BY MOUTH AT ONSET OF HEADACHE, Disp: , Rfl:     Current Facility-Administered Medications:     triamcinolone acetonide (KENALOG-40) injection 40 mg, 40 mg, Intramuscular, Once, Delilah Kulkarni, FRANKY    Past Medical History:   Diagnosis Date    Anxiety     Asthma     AS A CHILD     Balance disorder     Chiari I malformation     Cholelithiasis        Past Surgical History:   Procedure Laterality Date    NERVE DECOMPRESSION  2011    SHUNT REVISION         Social History     Socioeconomic History    Marital status:    Tobacco Use    Smoking status: Never    Smokeless tobacco: Never   Vaping Use    Vaping Use: Never used   Substance and Sexual Activity    Alcohol use: Not Currently    Drug use: Never    Sexual activity: Yes       Family History   Problem Relation Age of Onset    Chiari malformation Mother     Stroke Mother     Heart disease Mother     ADD / ADHD Father     Heart disease Father     Heart disease Brother     Thyroid disease Maternal Aunt     Heart disease Maternal Aunt     Hyperlipidemia Maternal Uncle     No Known Problems Paternal Aunt     ADD / ADHD Paternal Uncle     Heart disease Paternal Uncle     Diabetes Paternal Uncle     No Known Problems Maternal Grandmother     Heart disease Maternal  "Grandfather     Diabetes Paternal Grandmother     Heart disease Paternal Grandfather        Objective    Temp 97.5 °F (36.4 °C)   Ht 172.7 cm (67.99\")   Wt 102 kg (224 lb 12.8 oz)   BMI 34.19 kg/m²     Physical Exam  Constitutional:       Appearance: Normal appearance.   Abdominal:      Tenderness: There is no right CVA tenderness or left CVA tenderness.   Skin:     General: Skin is warm and dry.   Neurological:      Mental Status: He is alert and oriented to person, place, and time.   Psychiatric:         Mood and Affect: Mood normal.         Behavior: Behavior normal.             Results for orders placed or performed in visit on 01/02/24   [employer] Select 13 (MW) - Urine, Clean Catch    Specimen: Urine, Clean Catch   Result Value Ref Range    Report Summary FINAL    KUB independent review    A KUB is available for me to review today.  The image is inspected for a bowel gas pattern and the general bone structure of the spine and pelvis. The kidneys are then inspected closely.  Renal outline is noted if identifiable. The kidney, collecting system, and anticipated path of the ureter are examined for calcifications including those in the true pelvis.  This film reveals:    On the right there are no calcificaitons seen in the kidney or the expected course of the ureter. .    On the left there are no calcificaitons seen in the kidney or the expected course of the ureter. .    Assessment and Plan    Diagnoses and all orders for this visit:    1. Right ureteral stone (Primary)  -     XR abdomen kub; Future    2. Urinary hesitancy  -     tamsulosin (FLOMAX) 0.4 MG capsule 24 hr capsule; Take 1 capsule by mouth 2 (Two) Times a Day for 360 days.  Dispense: 180 capsule; Refill: 3        Initially with complaints of decreased urination urine hesitancy and intermittency having to strain to urinate.  Reports this has improved overall since starting the tamsulosin 0.8 mg daily.  Still experiencing occasional episodes at the " end of void.    At this point we will continue the tamsulosin 0.4 mg twice daily and if symptoms worsen recommend further evaluation with cystoscopy    KUB today no stones visualized     F/u in 6 months

## 2024-01-02 ENCOUNTER — OFFICE VISIT (OUTPATIENT)
Dept: FAMILY MEDICINE CLINIC | Facility: CLINIC | Age: 29
End: 2024-01-02
Payer: MEDICARE

## 2024-01-02 VITALS
DIASTOLIC BLOOD PRESSURE: 87 MMHG | HEART RATE: 86 BPM | SYSTOLIC BLOOD PRESSURE: 130 MMHG | OXYGEN SATURATION: 98 % | HEIGHT: 68 IN | WEIGHT: 221.4 LBS | TEMPERATURE: 99.3 F | BODY MASS INDEX: 33.56 KG/M2

## 2024-01-02 DIAGNOSIS — Z51.81 THERAPEUTIC DRUG MONITORING: Primary | ICD-10-CM

## 2024-01-02 DIAGNOSIS — F41.9 ANXIETY: ICD-10-CM

## 2024-01-02 DIAGNOSIS — E66.09 CLASS 1 OBESITY DUE TO EXCESS CALORIES WITH SERIOUS COMORBIDITY AND BODY MASS INDEX (BMI) OF 34.0 TO 34.9 IN ADULT: ICD-10-CM

## 2024-01-02 RX ORDER — CLONAZEPAM 0.5 MG/1
0.5 TABLET ORAL 2 TIMES DAILY
Qty: 60 TABLET | Refills: 0 | Status: SHIPPED | OUTPATIENT
Start: 2024-01-02

## 2024-01-02 NOTE — PROGRESS NOTES
CC: anxiety - controlled med monitoring    History:  Curtis Garsia is a 28 y.o. male who presents today for evaluation of the above problems.     Patient reports that he is doing well. His clonazepam continues to work well for his anxiety. He denies any new issues. Contract is renewed today. Gopal is reviewed and appropriate. UDS to be done today.         5/2/2022     8:00 AM 11/4/2022     8:00 AM 2/3/2023     8:00 AM 9/7/2023     8:00 AM 1/2/2024    10:00 AM   CONTROLLED SUBSTANCE TRACKING   Last Gopal 5/2/2022 11/4/2022 2/3/2023 9/7/2023 1/2/2024   Report Number reviewed through epic reviewed through Psychiatric reviewed through Psychiatric reviewed through Psychiatric reviewed through Psychiatric   Last UDS 2/2/2022 3/30/2022 2/3/2023 2/3/2023 1/2/2024   Last Controlled Substance Agreement 3/30/2022 2/22/2022 2/3/2023 2/3/2023 1/2/2024          HPI  ROS:  Review of Systems   Psychiatric/Behavioral:  The patient is nervous/anxious.        No Known Allergies  Past Medical History:   Diagnosis Date    Anxiety     Asthma     AS A CHILD     Balance disorder     Chiari I malformation     Cholelithiasis      Past Surgical History:   Procedure Laterality Date    NERVE DECOMPRESSION  2011    SHUNT REVISION       Family History   Problem Relation Age of Onset    Chiari malformation Mother     Stroke Mother     Heart disease Mother     ADD / ADHD Father     Heart disease Father     Heart disease Brother     Thyroid disease Maternal Aunt     Heart disease Maternal Aunt     Hyperlipidemia Maternal Uncle     No Known Problems Paternal Aunt     ADD / ADHD Paternal Uncle     Heart disease Paternal Uncle     Diabetes Paternal Uncle     No Known Problems Maternal Grandmother     Heart disease Maternal Grandfather     Diabetes Paternal Grandmother     Heart disease Paternal Grandfather       reports that he has never smoked. He has never used smokeless tobacco. He reports that he does not currently use alcohol. He reports that he does not use  "drugs.      Current Outpatient Medications:     amphetamine-dextroamphetamine (ADDERALL) 5 MG tablet, Take 1 tablet by mouth Daily. Prescribed by psychologist, Disp: , Rfl:     clonazePAM (KlonoPIN) 0.5 MG tablet, Take 1 tablet by mouth 2 (Two) Times a Day., Disp: 60 tablet, Rfl: 0    divalproex (DEPAKOTE ER) 500 MG 24 hr tablet, Take 2 tablets by mouth Daily., Disp: , Rfl:     DULoxetine (CYMBALTA) 60 MG capsule, Take 1 capsule by mouth 2 (Two) Times a Day., Disp: 90 capsule, Rfl: 3    famotidine (Pepcid) 20 MG tablet, Take 1 tablet by mouth 2 (Two) Times a Day As Needed for Heartburn., Disp: 60 tablet, Rfl: 2    gabapentin (NEURONTIN) 600 MG tablet, Take 1 tablet by mouth 4 (Four) Times a Day., Disp: , Rfl:     losartan (COZAAR) 25 MG tablet, Take 1 tablet by mouth Daily., Disp: 90 tablet, Rfl: 3    pantoprazole (Protonix) 40 MG EC tablet, Take 1 tablet by mouth Daily., Disp: 30 tablet, Rfl: 2    tamsulosin (FLOMAX) 0.4 MG capsule 24 hr capsule, Take 1 capsule by mouth Every Night for 360 days., Disp: 90 capsule, Rfl: 3    traZODone (DESYREL) 50 MG tablet, TAKE 1 TO 2 TABLETS BY MOUTH NIGHTLY FOR SLEEP, Disp: 60 tablet, Rfl: 6    Ubrelvy 100 MG tablet, TAKE 1 TABLET BY MOUTH AT ONSET OF HEADACHE, Disp: , Rfl:     Current Facility-Administered Medications:     triamcinolone acetonide (KENALOG-40) injection 40 mg, 40 mg, Intramuscular, Once, Delilah Kulkarni, FRANKY    OBJECTIVE:  /87 (BP Location: Left arm, Patient Position: Sitting, Cuff Size: Large Adult)   Pulse 86   Temp 99.3 °F (37.4 °C) (Temporal)   Ht 172.7 cm (68\")   Wt 100 kg (221 lb 6.4 oz)   SpO2 98%   BMI 33.66 kg/m²    Physical Exam  Vitals reviewed.   Constitutional:       Appearance: He is well-developed.   Cardiovascular:      Rate and Rhythm: Normal rate and regular rhythm.      Heart sounds: Normal heart sounds.   Pulmonary:      Effort: Pulmonary effort is normal.      Breath sounds: Normal breath sounds.   Neurological:      Mental " Status: He is alert and oriented to person, place, and time.   Psychiatric:         Behavior: Behavior normal.         Assessment/Plan    Diagnoses and all orders for this visit:    1. Therapeutic drug monitoring (Primary)  -     ToxASSURE Select 13 (MW) - Urine, Clean Catch    2. Anxiety  -     clonazePAM (KlonoPIN) 0.5 MG tablet; Take 1 tablet by mouth 2 (Two) Times a Day.  Dispense: 60 tablet; Refill: 0    3. Class 1 obesity due to excess calories with serious comorbidity and body mass index (BMI) of 34.0 to 34.9 in adult          An After Visit Summary was printed and given to the patient at discharge.  Return in about 1 month (around 2/2/2024) for Medicare Wellness.       FRANKY Fitzgerald 1/2/24    Electronically signed.

## 2024-01-06 LAB — DRUGS UR: NORMAL

## 2024-01-08 ENCOUNTER — HOSPITAL ENCOUNTER (OUTPATIENT)
Dept: GENERAL RADIOLOGY | Facility: HOSPITAL | Age: 29
Discharge: HOME | End: 2024-01-08
Payer: MEDICARE

## 2024-01-08 ENCOUNTER — OFFICE VISIT (OUTPATIENT)
Dept: UROLOGY | Facility: CLINIC | Age: 29
End: 2024-01-08
Payer: MEDICARE

## 2024-01-08 VITALS — HEIGHT: 68 IN | TEMPERATURE: 97.5 F | WEIGHT: 224.8 LBS | BODY MASS INDEX: 34.07 KG/M2

## 2024-01-08 DIAGNOSIS — N20.1 RIGHT URETERAL STONE: ICD-10-CM

## 2024-01-08 DIAGNOSIS — R39.11 URINARY HESITANCY: ICD-10-CM

## 2024-01-08 DIAGNOSIS — N20.1 RIGHT URETERAL STONE: Primary | ICD-10-CM

## 2024-01-08 PROCEDURE — 1159F MED LIST DOCD IN RCRD: CPT

## 2024-01-08 PROCEDURE — 81001 URINALYSIS AUTO W/SCOPE: CPT

## 2024-01-08 PROCEDURE — 99214 OFFICE O/P EST MOD 30 MIN: CPT

## 2024-01-08 PROCEDURE — 1160F RVW MEDS BY RX/DR IN RCRD: CPT

## 2024-01-08 PROCEDURE — 74018 RADEX ABDOMEN 1 VIEW: CPT

## 2024-01-08 RX ORDER — TAMSULOSIN HYDROCHLORIDE 0.4 MG/1
1 CAPSULE ORAL 2 TIMES DAILY
Qty: 180 CAPSULE | Refills: 3 | Status: SHIPPED | OUTPATIENT
Start: 2024-01-08 | End: 2025-01-02

## 2024-01-08 RX ORDER — CITALOPRAM HYDROBROMIDE 10 MG/1
TABLET ORAL
COMMUNITY
Start: 2024-01-04

## 2024-02-05 ENCOUNTER — OFFICE VISIT (OUTPATIENT)
Dept: FAMILY MEDICINE CLINIC | Facility: CLINIC | Age: 29
End: 2024-02-05
Payer: COMMERCIAL

## 2024-02-05 VITALS
OXYGEN SATURATION: 97 % | WEIGHT: 218 LBS | DIASTOLIC BLOOD PRESSURE: 81 MMHG | TEMPERATURE: 98.2 F | SYSTOLIC BLOOD PRESSURE: 122 MMHG | HEART RATE: 91 BPM | BODY MASS INDEX: 33.04 KG/M2 | HEIGHT: 68 IN

## 2024-02-05 DIAGNOSIS — Z00.00 MEDICARE ANNUAL WELLNESS VISIT, SUBSEQUENT: Primary | ICD-10-CM

## 2024-02-05 DIAGNOSIS — R53.83 FATIGUE, UNSPECIFIED TYPE: ICD-10-CM

## 2024-02-05 DIAGNOSIS — F32.A DEPRESSIVE DISORDER: ICD-10-CM

## 2024-02-05 DIAGNOSIS — Z98.2 S/P VP SHUNT: ICD-10-CM

## 2024-02-05 DIAGNOSIS — E66.09 CLASS 1 OBESITY DUE TO EXCESS CALORIES WITH SERIOUS COMORBIDITY AND BODY MASS INDEX (BMI) OF 33.0 TO 33.9 IN ADULT: ICD-10-CM

## 2024-02-05 DIAGNOSIS — I10 PRIMARY HYPERTENSION: ICD-10-CM

## 2024-02-05 DIAGNOSIS — K21.9 GASTROESOPHAGEAL REFLUX DISEASE, UNSPECIFIED WHETHER ESOPHAGITIS PRESENT: ICD-10-CM

## 2024-02-05 LAB
BILIRUB BLD-MCNC: NEGATIVE MG/DL
CLARITY, POC: CLEAR
COLOR UR: NORMAL
GLUCOSE UR STRIP-MCNC: NEGATIVE MG/DL
KETONES UR QL: NEGATIVE
LEUKOCYTE EST, POC: NEGATIVE
NITRITE UR-MCNC: NEGATIVE MG/ML
PH UR: 6.5 [PH] (ref 5–8)
PROT UR STRIP-MCNC: NEGATIVE MG/DL
RBC # UR STRIP: NEGATIVE /UL
SP GR UR: 1.02 (ref 1–1.03)
UROBILINOGEN UR QL: NORMAL

## 2024-02-05 PROCEDURE — 99214 OFFICE O/P EST MOD 30 MIN: CPT | Performed by: NURSE PRACTITIONER

## 2024-02-05 PROCEDURE — 81003 URINALYSIS AUTO W/O SCOPE: CPT | Performed by: NURSE PRACTITIONER

## 2024-02-05 NOTE — PROGRESS NOTES
The ABCs of the Annual Wellness Visit  Subsequent Medicare Wellness Visit    Subjective    Curtis Garsia is a 28 y.o. male who presents for a Subsequent Medicare Wellness Visit.    The following portions of the patient's history were reviewed and   updated as appropriate: allergies, current medications, past family history, past medical history, past social history, past surgical history, and problem list.    Compared to one year ago, the patient feels his physical   health is the same.    Compared to one year ago, the patient feels his mental   health is the same.    Recent Hospitalizations:  He was not admitted to the hospital during the last year.       Current Medical Providers:  Patient Care Team:  Delilah Kulkarni APRN as PCP - General (Nurse Practitioner)  Jason Pearson MD as Cardiologist (Cardiology)  Augustina Pearson APRN as Nurse Practitioner (Family Medicine)    Outpatient Medications Prior to Visit   Medication Sig Dispense Refill    amphetamine-dextroamphetamine (ADDERALL) 5 MG tablet Take 1 tablet by mouth Daily. Prescribed by psychologist      citalopram (CeleXA) 10 MG tablet       clonazePAM (KlonoPIN) 0.5 MG tablet Take 1 tablet by mouth 2 (Two) Times a Day. 60 tablet 0    divalproex (DEPAKOTE ER) 500 MG 24 hr tablet Take 2 tablets by mouth Daily.      DULoxetine (CYMBALTA) 60 MG capsule Take 1 capsule by mouth 2 (Two) Times a Day. (Patient taking differently: Take 1 capsule by mouth Every Other Day.) 90 capsule 3    famotidine (Pepcid) 20 MG tablet Take 1 tablet by mouth 2 (Two) Times a Day As Needed for Heartburn. 60 tablet 2    gabapentin (NEURONTIN) 600 MG tablet Take 1 tablet by mouth 4 (Four) Times a Day.      losartan (COZAAR) 25 MG tablet Take 1 tablet by mouth Daily. 90 tablet 3    pantoprazole (Protonix) 40 MG EC tablet Take 1 tablet by mouth Daily. 30 tablet 2    tamsulosin (FLOMAX) 0.4 MG capsule 24 hr capsule Take 1 capsule by mouth 2 (Two) Times a Day for 360 days. 180  "capsule 3    traZODone (DESYREL) 50 MG tablet TAKE 1 TO 2 TABLETS BY MOUTH NIGHTLY FOR SLEEP 60 tablet 6    Ubrelvy 100 MG tablet TAKE 1 TABLET BY MOUTH AT ONSET OF HEADACHE       Facility-Administered Medications Prior to Visit   Medication Dose Route Frequency Provider Last Rate Last Admin    triamcinolone acetonide (KENALOG-40) injection 40 mg  40 mg Intramuscular Once Silvernae Delilah M, APRN           No opioid medication identified on active medication list. I have reviewed chart for other potential  high risk medication/s and harmful drug interactions in the elderly.        Aspirin is not on active medication list.  Aspirin use is not indicated based on review of current medical condition/s. Risk of harm outweighs potential benefits.  .    Patient Active Problem List   Diagnosis    Hydrocephalus    Palpitations    S/P  shunt Dr Charly Li 05/2021    JOSHI (dyspnea on exertion)    Class 1 obesity due to excess calories with serious comorbidity and body mass index (BMI) of 33.0 to 33.9 in adult    Primary hypertension    Anxiety    Seizures    Depressive disorder    Hydronephrosis with renal and ureteral calculus obstruction    GERD (gastroesophageal reflux disease)     Advance Care Planning   Advance Care Planning     Advance Directive is not on file.  ACP discussion was held with the patient during this visit. Patient does not have an advance directive, information provided.     Objective    Vitals:    02/05/24 0848   BP: 122/81   BP Location: Left arm   Patient Position: Sitting   Cuff Size: Large Adult   Pulse: 91   Temp: 98.2 °F (36.8 °C)   TempSrc: Temporal   SpO2: 97%   Weight: 98.9 kg (218 lb)   Height: 172.7 cm (68\")   PainSc:   7   PainLoc: Neck     Estimated body mass index is 33.15 kg/m² as calculated from the following:    Height as of this encounter: 172.7 cm (68\").    Weight as of this encounter: 98.9 kg (218 lb).           Does the patient have evidence of cognitive impairment? No        "   HEALTH RISK ASSESSMENT    Smoking Status:  Social History     Tobacco Use   Smoking Status Never    Passive exposure: Past   Smokeless Tobacco Never     Alcohol Consumption:  Social History     Substance and Sexual Activity   Alcohol Use Not Currently     Fall Risk Screen:    RANDEE Fall Risk Assessment has not been completed.    Depression Screenin/2/2024    10:22 AM   PHQ-2/PHQ-9 Depression Screening   Little Interest or Pleasure in Doing Things 3-->nearly every day   Feeling Down, Depressed or Hopeless 1-->several days   Trouble Falling or Staying Asleep, or Sleeping Too Much 3-->nearly every day   Feeling Tired or Having Little Energy 0-->not at all   Poor Appetite or Overeating 3-->nearly every day   Feeling Bad about Yourself - or that You are a Failure or Have Let Yourself or Your Family Down 3-->nearly every day   Trouble Concentrating on Things, Such as Reading the Newspaper or Watching Television 3-->nearly every day   Moving or Speaking So Slowly that Other People Could Have Noticed? Or the Opposite - Being So Fidgety 1-->several days   Thoughts that You Would be Better Off Dead or of Hurting Yourself in Some Way 0-->not at all   PHQ-9: Brief Depression Severity Measure Score 17   If You Checked Off Any Problems, How Difficult Have These Problems Made It For You to Do Your Work, Take Care of Things at Home, or Get Along with Other People? extremely difficult       Health Habits and Functional and Cognitive Screening:       No data to display                Age-appropriate Screening Schedule:  Refer to the list below for future screening recommendations based on patient's age, sex and/or medical conditions. Orders for these recommended tests are listed in the plan section. The patient has been provided with a written plan.    Health Maintenance   Topic Date Due    TDAP/TD VACCINES (1 - Tdap) Never done    INFLUENZA VACCINE  2024 (Originally 2023)    COVID-19 Vaccine (1) 2025  "(Originally 5/3/1996)    BMI FOLLOWUP  10/02/2024    ANNUAL WELLNESS VISIT  02/05/2025    HEPATITIS C SCREENING  Completed    Pneumococcal Vaccine 0-64  Aged Out                  CMS Preventative Services Quick Reference  Risk Factors Identified During Encounter  Depression/Dysphoria:  managed by Psych.  The above risks/problems have been discussed with the patient.  Pertinent information has been shared with the patient in the After Visit Summary.  An After Visit Summary and PPPS were made available to the patient.    Follow Up:   Next Medicare Wellness visit to be scheduled in 1 year.       Additional E&M Note during same encounter follows:  Patient has multiple medical problems which are significant and separately identifiable that require additional work above and beyond the Medicare Wellness Visit.      Chief Complaint  Annual Exam (fasting)    Subjective        HPI  Curtis Garsia is also being seen today for GERD, HTN, obesity.    Patient reports that he is doing well. Has been having a pulling sensation around his shunt tubing on the right side. Has follow up with neurosurgery in a few weeks and will discuss with them. Denies any other issues today.     Review of Systems   Respiratory:  Negative for shortness of breath.    Cardiovascular:  Negative for chest pain.   Gastrointestinal:  Negative for abdominal pain and constipation.   Neurological:  Positive for dizziness.       Objective   Vital Signs:  /81 (BP Location: Left arm, Patient Position: Sitting, Cuff Size: Large Adult)   Pulse 91   Temp 98.2 °F (36.8 °C) (Temporal)   Ht 172.7 cm (68\")   Wt 98.9 kg (218 lb)   SpO2 97%   BMI 33.15 kg/m²     Physical Exam  Vitals reviewed.   Constitutional:       Appearance: He is well-developed.   HENT:      Right Ear: Tympanic membrane, ear canal and external ear normal.      Left Ear: Tympanic membrane, ear canal and external ear normal.      Ears:      Comments: Canals are very red, but patient " denies any discomfort. Wears earplugs daily due to noise sensitivity.     Mouth/Throat:      Mouth: Mucous membranes are moist.      Pharynx: Oropharynx is clear.   Neck:      Vascular: No carotid bruit.   Cardiovascular:      Rate and Rhythm: Normal rate and regular rhythm.      Heart sounds: Normal heart sounds.   Pulmonary:      Effort: Pulmonary effort is normal.      Breath sounds: Normal breath sounds.   Abdominal:      General: Abdomen is flat. Bowel sounds are normal.      Palpations: Abdomen is soft.   Neurological:      Mental Status: He is alert and oriented to person, place, and time.   Psychiatric:         Behavior: Behavior normal.                         Assessment and Plan   Diagnoses and all orders for this visit:    1. Medicare annual wellness visit, subsequent (Primary)  -     CBC & Differential  -     TSH  -     T4, free  -     Comprehensive Metabolic Panel  -     Lipid Panel  -     POC Urinalysis Dipstick, Multipro    2. S/P  shunt Dr Charly Li 05/2021    3. Class 1 obesity due to excess calories with serious comorbidity and body mass index (BMI) of 33.0 to 33.9 in adult  -     Lipid Panel    4. Gastroesophageal reflux disease, unspecified whether esophagitis present    5. Depressive disorder    6. Fatigue, unspecified type  -     CBC & Differential  -     TSH  -     T4, free  -     Comprehensive Metabolic Panel    7. Primary hypertension  -     Comprehensive Metabolic Panel  -     Lipid Panel  -     POC Urinalysis Dipstick, Multipro             Follow Up   Return in about 6 months (around 8/5/2024) for Next scheduled follow up.  Patient was given instructions and counseling regarding his condition or for health maintenance advice. Please see specific information pulled into the AVS if appropriate.     Delilah Kulkarni, APRN 2/5/24

## 2024-02-06 LAB
ALBUMIN SERPL-MCNC: 4.7 G/DL (ref 3.5–5.2)
ALBUMIN/GLOB SERPL: 1.7 G/DL
ALP SERPL-CCNC: 103 U/L (ref 39–117)
ALT SERPL-CCNC: 15 U/L (ref 1–41)
AST SERPL-CCNC: 11 U/L (ref 1–40)
BASOPHILS # BLD AUTO: 0.1 10*3/MM3 (ref 0–0.2)
BASOPHILS NFR BLD AUTO: 1.2 % (ref 0–1.5)
BILIRUB SERPL-MCNC: 0.3 MG/DL (ref 0–1.2)
BUN SERPL-MCNC: 9 MG/DL (ref 6–20)
BUN/CREAT SERPL: 10.6 (ref 7–25)
CALCIUM SERPL-MCNC: 9.3 MG/DL (ref 8.6–10.5)
CHLORIDE SERPL-SCNC: 103 MMOL/L (ref 98–107)
CHOLEST SERPL-MCNC: 157 MG/DL (ref 0–200)
CO2 SERPL-SCNC: 25.9 MMOL/L (ref 22–29)
CREAT SERPL-MCNC: 0.85 MG/DL (ref 0.76–1.27)
EGFRCR SERPLBLD CKD-EPI 2021: 119.9 ML/MIN/1.73
EOSINOPHIL # BLD AUTO: 0.5 10*3/MM3 (ref 0–0.4)
EOSINOPHIL NFR BLD AUTO: 5.9 % (ref 0.3–6.2)
ERYTHROCYTE [DISTWIDTH] IN BLOOD BY AUTOMATED COUNT: 13.8 % (ref 12.3–15.4)
GLOBULIN SER CALC-MCNC: 2.8 GM/DL
GLUCOSE SERPL-MCNC: 93 MG/DL (ref 65–99)
HCT VFR BLD AUTO: 44.2 % (ref 37.5–51)
HDLC SERPL-MCNC: 44 MG/DL (ref 40–60)
HGB BLD-MCNC: 14.1 G/DL (ref 13–17.7)
IMM GRANULOCYTES # BLD AUTO: 0.03 10*3/MM3 (ref 0–0.05)
IMM GRANULOCYTES NFR BLD AUTO: 0.4 % (ref 0–0.5)
LDLC SERPL CALC-MCNC: 86 MG/DL (ref 0–100)
LYMPHOCYTES # BLD AUTO: 1.76 10*3/MM3 (ref 0.7–3.1)
LYMPHOCYTES NFR BLD AUTO: 20.6 % (ref 19.6–45.3)
MCH RBC QN AUTO: 26.2 PG (ref 26.6–33)
MCHC RBC AUTO-ENTMCNC: 31.9 G/DL (ref 31.5–35.7)
MCV RBC AUTO: 82.2 FL (ref 79–97)
MONOCYTES # BLD AUTO: 0.95 10*3/MM3 (ref 0.1–0.9)
MONOCYTES NFR BLD AUTO: 11.1 % (ref 5–12)
NEUTROPHILS # BLD AUTO: 5.2 10*3/MM3 (ref 1.7–7)
NEUTROPHILS NFR BLD AUTO: 60.8 % (ref 42.7–76)
NRBC BLD AUTO-RTO: 0 /100 WBC (ref 0–0.2)
PLATELET # BLD AUTO: 425 10*3/MM3 (ref 140–450)
POTASSIUM SERPL-SCNC: 4.4 MMOL/L (ref 3.5–5.2)
PROT SERPL-MCNC: 7.5 G/DL (ref 6–8.5)
RBC # BLD AUTO: 5.38 10*6/MM3 (ref 4.14–5.8)
SODIUM SERPL-SCNC: 141 MMOL/L (ref 136–145)
T4 FREE SERPL-MCNC: 1.22 NG/DL (ref 0.93–1.7)
TRIGL SERPL-MCNC: 154 MG/DL (ref 0–150)
TSH SERPL DL<=0.005 MIU/L-ACNC: 5.2 UIU/ML (ref 0.27–4.2)
VLDLC SERPL CALC-MCNC: 27 MG/DL (ref 5–40)
WBC # BLD AUTO: 8.54 10*3/MM3 (ref 3.4–10.8)

## 2024-02-06 RX ORDER — LEVOTHYROXINE SODIUM 0.03 MG/1
25 TABLET ORAL
Qty: 90 TABLET | Refills: 3 | Status: SHIPPED | OUTPATIENT
Start: 2024-02-06

## 2024-02-06 NOTE — PROGRESS NOTES
Thyroid level is off again, a little more than last time. I want to start him on just 25 mcg of levothyroxine. I think he will feel better overall.

## 2024-02-20 DIAGNOSIS — F41.9 ANXIETY: ICD-10-CM

## 2024-02-20 RX ORDER — CLONAZEPAM 0.5 MG/1
0.5 TABLET ORAL 2 TIMES DAILY
Qty: 60 TABLET | Refills: 0 | Status: SHIPPED | OUTPATIENT
Start: 2024-02-20

## 2024-02-20 NOTE — TELEPHONE ENCOUNTER
Rx Refill Note  Requested Prescriptions     Pending Prescriptions Disp Refills    clonazePAM (KlonoPIN) 0.5 MG tablet 60 tablet 0     Sig: Take 1 tablet by mouth 2 (Two) Times a Day.      Last office visit with prescribing clinician: 2/5/2024   Last telemedicine visit with prescribing clinician: Visit date not found   Next office visit with prescribing clinician: 2/21/2024   Requirements are up to date    Ann Calderon MA  02/20/24, 12:07 CST

## 2024-02-21 ENCOUNTER — OFFICE VISIT (OUTPATIENT)
Dept: FAMILY MEDICINE CLINIC | Facility: CLINIC | Age: 29
End: 2024-02-21
Payer: COMMERCIAL

## 2024-02-21 VITALS
HEART RATE: 82 BPM | OXYGEN SATURATION: 98 % | HEIGHT: 68 IN | SYSTOLIC BLOOD PRESSURE: 116 MMHG | DIASTOLIC BLOOD PRESSURE: 80 MMHG | BODY MASS INDEX: 33.07 KG/M2 | WEIGHT: 218.2 LBS | TEMPERATURE: 98.2 F

## 2024-02-21 DIAGNOSIS — R05.1 ACUTE COUGH: ICD-10-CM

## 2024-02-21 DIAGNOSIS — J01.00 ACUTE NON-RECURRENT MAXILLARY SINUSITIS: Primary | ICD-10-CM

## 2024-02-21 RX ORDER — PREDNISONE 10 MG/1
TABLET ORAL
Qty: 21 TABLET | Refills: 0 | Status: SHIPPED | OUTPATIENT
Start: 2024-02-21

## 2024-02-21 RX ORDER — AZITHROMYCIN 250 MG/1
TABLET, FILM COATED ORAL
Qty: 6 TABLET | Refills: 0 | Status: SHIPPED | OUTPATIENT
Start: 2024-02-21

## 2024-02-21 RX ORDER — TRIAMCINOLONE ACETONIDE 40 MG/ML
40 INJECTION, SUSPENSION INTRA-ARTICULAR; INTRAMUSCULAR ONCE
Status: COMPLETED | OUTPATIENT
Start: 2024-02-21 | End: 2024-02-21

## 2024-02-21 RX ADMIN — TRIAMCINOLONE ACETONIDE 40 MG: 40 INJECTION, SUSPENSION INTRA-ARTICULAR; INTRAMUSCULAR at 11:35

## 2024-02-21 NOTE — PROGRESS NOTES
CC: cough    History:  Curtis Garsia is a 28 y.o. male who presents today for evaluation of the above problems.      Cough with yellow sputum for 4 days. Some congestion noted as well. Hasn't been taking anything for symptoms. No fever noted.     Cough  This is a new problem. The current episode started in the past 7 days. The problem has been worse. The problem occurs every few minutes. The cough is Productive of yellow sputum. Associated symptoms include headaches, nasal congestion, postnasal drip and a sore throat. Pertinent negatives include no fever, rhinorrhea or shortness of breath. Nothing aggravates the symptoms. He has tried nothing for the symptoms.     ROS:  Review of Systems   Constitutional:  Negative for fever.   HENT:  Positive for postnasal drip and sore throat. Negative for rhinorrhea.    Respiratory:  Positive for cough. Negative for shortness of breath.    Gastrointestinal:  Negative for diarrhea and nausea.   Neurological:  Positive for headaches.       No Known Allergies  Past Medical History:   Diagnosis Date    Anxiety     Asthma     AS A CHILD     Balance disorder     Chiari I malformation     Cholelithiasis      Past Surgical History:   Procedure Laterality Date    NERVE DECOMPRESSION  2011    SHUNT REVISION       Family History   Problem Relation Age of Onset    Chiari malformation Mother     Stroke Mother     Heart disease Mother     ADD / ADHD Father     Heart disease Father     Heart disease Brother     Thyroid disease Maternal Aunt     Heart disease Maternal Aunt     Hyperlipidemia Maternal Uncle     No Known Problems Paternal Aunt     ADD / ADHD Paternal Uncle     Heart disease Paternal Uncle     Diabetes Paternal Uncle     No Known Problems Maternal Grandmother     Heart disease Maternal Grandfather     Diabetes Paternal Grandmother     Heart disease Paternal Grandfather       reports that he has never smoked. He has been exposed to tobacco smoke. He has never used smokeless  tobacco. He reports that he does not currently use alcohol. He reports that he does not use drugs.      Current Outpatient Medications:     amphetamine-dextroamphetamine (ADDERALL) 5 MG tablet, Take 1 tablet by mouth Daily. Prescribed by psychologist, Disp: , Rfl:     citalopram (CeleXA) 10 MG tablet, , Disp: , Rfl:     clonazePAM (KlonoPIN) 0.5 MG tablet, Take 1 tablet by mouth 2 (Two) Times a Day., Disp: 60 tablet, Rfl: 0    divalproex (DEPAKOTE ER) 500 MG 24 hr tablet, Take 2 tablets by mouth Daily., Disp: , Rfl:     DULoxetine (CYMBALTA) 60 MG capsule, Take 1 capsule by mouth 2 (Two) Times a Day. (Patient taking differently: Take 1 capsule by mouth Every Other Day.), Disp: 90 capsule, Rfl: 3    famotidine (Pepcid) 20 MG tablet, Take 1 tablet by mouth 2 (Two) Times a Day As Needed for Heartburn., Disp: 60 tablet, Rfl: 2    gabapentin (NEURONTIN) 600 MG tablet, Take 1 tablet by mouth 4 (Four) Times a Day., Disp: , Rfl:     levothyroxine (SYNTHROID, LEVOTHROID) 25 MCG tablet, Take 1 tablet by mouth Every Morning., Disp: 90 tablet, Rfl: 3    losartan (COZAAR) 25 MG tablet, Take 1 tablet by mouth Daily., Disp: 90 tablet, Rfl: 3    pantoprazole (Protonix) 40 MG EC tablet, Take 1 tablet by mouth Daily., Disp: 30 tablet, Rfl: 2    tamsulosin (FLOMAX) 0.4 MG capsule 24 hr capsule, Take 1 capsule by mouth 2 (Two) Times a Day for 360 days., Disp: 180 capsule, Rfl: 3    traZODone (DESYREL) 50 MG tablet, TAKE 1 TO 2 TABLETS BY MOUTH NIGHTLY FOR SLEEP, Disp: 60 tablet, Rfl: 6    Ubrelvy 100 MG tablet, TAKE 1 TABLET BY MOUTH AT ONSET OF HEADACHE, Disp: , Rfl:     azithromycin (Zithromax) 250 MG tablet, Take 2 tablets the first day, then 1 tablet daily for 4 days., Disp: 6 tablet, Rfl: 0    predniSONE (DELTASONE) 10 MG (21) dose pack, Use as directed on package, Disp: 21 tablet, Rfl: 0    Current Facility-Administered Medications:     triamcinolone acetonide (KENALOG-40) injection 40 mg, 40 mg, Intramuscular, Once, Shad,  "Delilah WILLOUGHBY APRN    OBJECTIVE:  /80 (BP Location: Left arm, Patient Position: Sitting, Cuff Size: Adult)   Pulse 82   Temp 98.2 °F (36.8 °C) (Oral)   Ht 172.7 cm (68\")   Wt 99 kg (218 lb 3.2 oz)   SpO2 98%   BMI 33.18 kg/m²    Physical Exam  Vitals reviewed.   Constitutional:       Appearance: He is well-developed.   HENT:      Mouth/Throat:      Mouth: Mucous membranes are moist.      Pharynx: Oropharynx is clear.   Cardiovascular:      Rate and Rhythm: Normal rate and regular rhythm.      Heart sounds: Normal heart sounds.   Pulmonary:      Effort: Pulmonary effort is normal.      Breath sounds: Normal breath sounds.   Neurological:      Mental Status: He is alert and oriented to person, place, and time.   Psychiatric:         Behavior: Behavior normal.         Assessment/Plan    Diagnoses and all orders for this visit:    1. Acute non-recurrent maxillary sinusitis (Primary)  -     azithromycin (Zithromax) 250 MG tablet; Take 2 tablets the first day, then 1 tablet daily for 4 days.  Dispense: 6 tablet; Refill: 0  -     triamcinolone acetonide (KENALOG-40) injection 40 mg  -     predniSONE (DELTASONE) 10 MG (21) dose pack; Use as directed on package  Dispense: 21 tablet; Refill: 0    2. Acute cough  -     POCT SARS-CoV-2 Antigen ABI + Flu          An After Visit Summary was printed and given to the patient at discharge.  Return if symptoms worsen or fail to improve, for Next scheduled follow up.       FRANKY Fitzgerald 2/21/24    Electronically signed.  "

## 2024-04-17 DIAGNOSIS — F41.9 ANXIETY: ICD-10-CM

## 2024-04-17 RX ORDER — CLONAZEPAM 0.5 MG/1
0.5 TABLET ORAL 2 TIMES DAILY
Qty: 60 TABLET | Refills: 0 | Status: SHIPPED | OUTPATIENT
Start: 2024-04-17

## 2024-04-17 NOTE — TELEPHONE ENCOUNTER
Rx Refill Note  Requested Prescriptions     Pending Prescriptions Disp Refills    clonazePAM (KlonoPIN) 0.5 MG tablet 60 tablet 0     Sig: Take 1 tablet by mouth 2 (Two) Times a Day.      Last office visit with prescribing clinician: 2/21/24  Last telemedicine visit with prescribing clinician: Visit date not found   Next office visit with prescribing clinician: 5/3/24    controlled med appt 1/2/24  contract 1/2/24  UDS 1/2/24    Violeta Rose MA  04/17/24, 09:52 CDT

## 2024-05-08 ENCOUNTER — OFFICE VISIT (OUTPATIENT)
Dept: FAMILY MEDICINE CLINIC | Facility: CLINIC | Age: 29
End: 2024-05-08
Payer: MEDICARE

## 2024-05-08 VITALS
OXYGEN SATURATION: 99 % | TEMPERATURE: 98.2 F | BODY MASS INDEX: 32.34 KG/M2 | SYSTOLIC BLOOD PRESSURE: 123 MMHG | DIASTOLIC BLOOD PRESSURE: 85 MMHG | HEART RATE: 97 BPM | WEIGHT: 213.4 LBS | HEIGHT: 68 IN

## 2024-05-08 DIAGNOSIS — M77.9 TENDONITIS: Primary | ICD-10-CM

## 2024-05-08 DIAGNOSIS — F41.9 ANXIETY: ICD-10-CM

## 2024-05-08 RX ORDER — TRIAMCINOLONE ACETONIDE 40 MG/ML
40 INJECTION, SUSPENSION INTRA-ARTICULAR; INTRAMUSCULAR ONCE
Status: COMPLETED | OUTPATIENT
Start: 2024-05-08 | End: 2024-05-08

## 2024-05-08 RX ORDER — PREDNISONE 10 MG/1
TABLET ORAL
Qty: 21 TABLET | Refills: 0 | Status: SHIPPED | OUTPATIENT
Start: 2024-05-08

## 2024-05-08 RX ORDER — GALCANEZUMAB 120 MG/ML
INJECTION, SOLUTION SUBCUTANEOUS
COMMUNITY

## 2024-05-08 RX ADMIN — TRIAMCINOLONE ACETONIDE 40 MG: 40 INJECTION, SUSPENSION INTRA-ARTICULAR; INTRAMUSCULAR at 09:52

## 2024-05-09 RX ORDER — CLONAZEPAM 0.5 MG/1
0.5 TABLET ORAL 2 TIMES DAILY
Qty: 60 TABLET | Refills: 0 | Status: SHIPPED | OUTPATIENT
Start: 2024-05-09

## 2024-05-15 DIAGNOSIS — R39.11 URINARY HESITANCY: ICD-10-CM

## 2024-05-15 RX ORDER — TAMSULOSIN HYDROCHLORIDE 0.4 MG/1
CAPSULE ORAL
Qty: 90 CAPSULE | Refills: 1 | Status: SHIPPED | OUTPATIENT
Start: 2024-05-15

## 2024-05-16 DIAGNOSIS — K21.9 GASTROESOPHAGEAL REFLUX DISEASE, UNSPECIFIED WHETHER ESOPHAGITIS PRESENT: ICD-10-CM

## 2024-05-16 DIAGNOSIS — F32.89 OTHER DEPRESSION: Primary | ICD-10-CM

## 2024-05-16 RX ORDER — DULOXETIN HYDROCHLORIDE 60 MG/1
60 CAPSULE, DELAYED RELEASE ORAL EVERY OTHER DAY
Qty: 45 CAPSULE | Refills: 2 | Status: SHIPPED | OUTPATIENT
Start: 2024-05-16

## 2024-05-16 RX ORDER — PANTOPRAZOLE SODIUM 40 MG/1
40 TABLET, DELAYED RELEASE ORAL DAILY
Qty: 90 TABLET | Refills: 2 | Status: SHIPPED | OUTPATIENT
Start: 2024-05-16

## 2024-05-16 RX ORDER — FAMOTIDINE 20 MG/1
20 TABLET, FILM COATED ORAL 2 TIMES DAILY PRN
Qty: 60 TABLET | Refills: 2 | Status: SHIPPED | OUTPATIENT
Start: 2024-05-16

## 2024-05-16 RX ORDER — DULOXETIN HYDROCHLORIDE 60 MG/1
60 CAPSULE, DELAYED RELEASE ORAL 2 TIMES DAILY
Qty: 90 CAPSULE | Refills: 3 | Status: CANCELLED | OUTPATIENT
Start: 2024-05-16

## 2024-05-16 NOTE — TELEPHONE ENCOUNTER
Rx Refill Note  Rx Refill Note  Requested Prescriptions     Pending Prescriptions Disp Refills    famotidine (Pepcid) 20 MG tablet 60 tablet 2     Sig: Take 1 tablet by mouth 2 (Two) Times a Day As Needed for Heartburn.      Last office visit with prescribing clinician: 5/8/2024   Last telemedicine visit with prescribing clinician: Visit date not found   Next office visit with prescribing clinician: 2/11/2025                         Would you like a call back once the refill request has been completed: [] Yes [] No    If the office needs to give you a call back, can they leave a voicemail: [] Yes [] No    Violeta Cruz MA  05/16/24, 09:30 CDT

## 2024-05-16 NOTE — TELEPHONE ENCOUNTER
Rx Refill Note  Requested Prescriptions     Pending Prescriptions Disp Refills    pantoprazole (Protonix) 40 MG EC tablet 30 tablet 2     Sig: Take 1 tablet by mouth Daily.      Last office visit with prescribing clinician: Visit date not found   Last telemedicine visit with prescribing clinician: Visit date not found   Next office visit with prescribing clinician: Visit date not found   CPE done 02/2024                  {TIP  Is Refill Pharmacy correct?: yes    Would you like a call back once the refill request has been completed: [] Yes [x] No    If the office needs to give you a call back, can they leave a voicemail: [] Yes [] No    Violeta Cruz MA  05/16/24, 09:28 CDT

## 2024-05-16 NOTE — TELEPHONE ENCOUNTER
Rx Refill Note  Requested Prescriptions     Pending Prescriptions Disp Refills    DULoxetine (CYMBALTA) 60 MG capsule [Pharmacy Med Name: DULoxetine HCl 60 MG Oral Capsule Delayed Release Particles] 90 capsule 0     Sig: Take 1 capsule by mouth twice daily      Last office visit with prescribing clinician: 5/8/2024   Last telemedicine visit with prescribing clinician: Visit date not found   Next office visit with prescribing clinician: 5/16/2024   CPE done 02/04/2024    {TIP  Please add Last Relevant Lab Date if appropriate: 2/05/2024             {TIP  Is Refill Pharmacy correct?: yes    Would you like a call back once the refill request has been completed: [] Yes [x] No    If the office needs to give you a call back, can they leave a voicemail: [] Yes [] No    Violeta Cruz MA  05/16/24, 09:23 CDT

## 2024-05-30 ENCOUNTER — OFFICE VISIT (OUTPATIENT)
Dept: FAMILY MEDICINE CLINIC | Facility: CLINIC | Age: 29
End: 2024-05-30
Payer: MEDICARE

## 2024-05-30 VITALS
HEART RATE: 84 BPM | BODY MASS INDEX: 31.71 KG/M2 | WEIGHT: 209.2 LBS | OXYGEN SATURATION: 99 % | HEIGHT: 68 IN | SYSTOLIC BLOOD PRESSURE: 130 MMHG | DIASTOLIC BLOOD PRESSURE: 87 MMHG | TEMPERATURE: 98 F

## 2024-05-30 DIAGNOSIS — G89.29 CHRONIC RIGHT SHOULDER PAIN: Primary | ICD-10-CM

## 2024-05-30 DIAGNOSIS — M25.511 CHRONIC RIGHT SHOULDER PAIN: Primary | ICD-10-CM

## 2024-05-30 PROCEDURE — 1160F RVW MEDS BY RX/DR IN RCRD: CPT | Performed by: NURSE PRACTITIONER

## 2024-05-30 PROCEDURE — 99213 OFFICE O/P EST LOW 20 MIN: CPT | Performed by: NURSE PRACTITIONER

## 2024-05-30 PROCEDURE — 3079F DIAST BP 80-89 MM HG: CPT | Performed by: NURSE PRACTITIONER

## 2024-05-30 PROCEDURE — 3075F SYST BP GE 130 - 139MM HG: CPT | Performed by: NURSE PRACTITIONER

## 2024-05-30 PROCEDURE — 1125F AMNT PAIN NOTED PAIN PRSNT: CPT | Performed by: NURSE PRACTITIONER

## 2024-05-30 PROCEDURE — 1159F MED LIST DOCD IN RCRD: CPT | Performed by: NURSE PRACTITIONER

## 2024-05-30 RX ORDER — DEXTROAMPHETAMINE SACCHARATE, AMPHETAMINE ASPARTATE, DEXTROAMPHETAMINE SULFATE AND AMPHETAMINE SULFATE 2.5; 2.5; 2.5; 2.5 MG/1; MG/1; MG/1; MG/1
10 TABLET ORAL 2 TIMES DAILY
COMMUNITY

## 2024-05-30 NOTE — PROGRESS NOTES
CC: right shoulder pain    History:  Curtis Garsia is a 28 y.o. male who presents today for evaluation of the above problems.      Patient continues to have right sided shoulder pain. The pain is generally with weight bearing in the arm, for example lifting his daughter. It started in early April. It did improve some with steroids, but when they were complete the pain returned. He notes that he is avoiding using the arm as a result of the discomfort.       HPI  ROS:  Review of Systems   Musculoskeletal:  Positive for arthralgias.       No Known Allergies  Past Medical History:   Diagnosis Date    Anxiety     Asthma     AS A CHILD     Balance disorder     Chiari I malformation     Cholelithiasis      Past Surgical History:   Procedure Laterality Date    NERVE DECOMPRESSION  2011    SHUNT REVISION       Family History   Problem Relation Age of Onset    Chiari malformation Mother     Stroke Mother     Heart disease Mother     ADD / ADHD Father     Heart disease Father     Heart disease Brother     Thyroid disease Maternal Aunt     Heart disease Maternal Aunt     Hyperlipidemia Maternal Uncle     No Known Problems Paternal Aunt     ADD / ADHD Paternal Uncle     Heart disease Paternal Uncle     Diabetes Paternal Uncle     No Known Problems Maternal Grandmother     Heart disease Maternal Grandfather     Diabetes Paternal Grandmother     Heart disease Paternal Grandfather       reports that he has never smoked. He has been exposed to tobacco smoke. He has never used smokeless tobacco. He reports that he does not currently use alcohol. He reports that he does not use drugs.      Current Outpatient Medications:     clonazePAM (KlonoPIN) 0.5 MG tablet, Take 1 tablet by mouth 2 (Two) Times a Day., Disp: 60 tablet, Rfl: 0    divalproex (DEPAKOTE ER) 500 MG 24 hr tablet, Take 2 tablets by mouth Daily., Disp: , Rfl:     DULoxetine (CYMBALTA) 60 MG capsule, Take 1 capsule by mouth Every Other Day., Disp: 45 capsule, Rfl: 2    " Emgality 120 MG/ML auto-injector pen, INJECT 2 PENS SUBCUTANEOUSLY ONCE AS A LOADING DOSE, Disp: , Rfl:     famotidine (Pepcid) 20 MG tablet, Take 1 tablet by mouth 2 (Two) Times a Day As Needed for Heartburn., Disp: 60 tablet, Rfl: 2    gabapentin (NEURONTIN) 600 MG tablet, Take 1 tablet by mouth 4 (Four) Times a Day., Disp: , Rfl:     levothyroxine (SYNTHROID, LEVOTHROID) 25 MCG tablet, Take 1 tablet by mouth Every Morning., Disp: 90 tablet, Rfl: 3    losartan (COZAAR) 25 MG tablet, Take 1 tablet by mouth Daily., Disp: 90 tablet, Rfl: 3    pantoprazole (Protonix) 40 MG EC tablet, Take 1 tablet by mouth Daily., Disp: 90 tablet, Rfl: 2    tamsulosin (FLOMAX) 0.4 MG capsule 24 hr capsule, TAKE 1 CAPSULE BY MOUTH AT BEDTIME ONCE DAILY, Disp: 90 capsule, Rfl: 1    traZODone (DESYREL) 50 MG tablet, TAKE 1 TO 2 TABLETS BY MOUTH NIGHTLY FOR SLEEP, Disp: 60 tablet, Rfl: 6    Ubrelvy 100 MG tablet, TAKE 1 TABLET BY MOUTH AT ONSET OF HEADACHE, Disp: , Rfl:     amphetamine-dextroamphetamine (ADDERALL) 10 MG tablet, Take 1 tablet by mouth 2 (Two) Times a Day., Disp: , Rfl:     Current Facility-Administered Medications:     triamcinolone acetonide (KENALOG-40) injection 40 mg, 40 mg, Intramuscular, Once, Delilah Kulkarni, APRN    OBJECTIVE:  /87 (BP Location: Right arm, Patient Position: Sitting, Cuff Size: Adult)   Pulse 84   Temp 98 °F (36.7 °C) (Temporal)   Ht 172.7 cm (68\")   Wt 94.9 kg (209 lb 3.2 oz)   SpO2 99%   BMI 31.81 kg/m²    Physical Exam  Musculoskeletal:      Right shoulder: Tenderness present. No swelling, deformity, effusion or laceration. Normal range of motion.        Arms:          Assessment/Plan    Diagnoses and all orders for this visit:    1. Chronic right shoulder pain (Primary)  -     XR Shoulder 2+ View Right; Future        An After Visit Summary was printed and given to the patient at discharge.  Return if symptoms worsen or fail to improve, for Next scheduled follow up.       Delilah" FRANKY Kulkarni 5/30/24    Electronically signed.

## 2024-06-10 DIAGNOSIS — M25.511 CHRONIC RIGHT SHOULDER PAIN: Primary | ICD-10-CM

## 2024-06-10 DIAGNOSIS — G89.29 CHRONIC RIGHT SHOULDER PAIN: Primary | ICD-10-CM

## 2024-06-24 ENCOUNTER — PATIENT MESSAGE (OUTPATIENT)
Dept: FAMILY MEDICINE CLINIC | Facility: CLINIC | Age: 29
End: 2024-06-24
Payer: MEDICARE

## 2024-06-24 DIAGNOSIS — F32.89 OTHER DEPRESSION: ICD-10-CM

## 2024-06-24 DIAGNOSIS — K21.9 GASTROESOPHAGEAL REFLUX DISEASE, UNSPECIFIED WHETHER ESOPHAGITIS PRESENT: ICD-10-CM

## 2024-06-24 RX ORDER — PANTOPRAZOLE SODIUM 40 MG/1
40 TABLET, DELAYED RELEASE ORAL DAILY
Qty: 90 TABLET | Refills: 0 | Status: SHIPPED | OUTPATIENT
Start: 2024-06-24 | End: 2024-06-24 | Stop reason: SDUPTHER

## 2024-06-24 RX ORDER — DULOXETIN HYDROCHLORIDE 60 MG/1
60 CAPSULE, DELAYED RELEASE ORAL EVERY OTHER DAY
Qty: 45 CAPSULE | Refills: 0 | Status: SHIPPED | OUTPATIENT
Start: 2024-06-24 | End: 2024-06-24 | Stop reason: SDUPTHER

## 2024-06-24 RX ORDER — DULOXETIN HYDROCHLORIDE 60 MG/1
60 CAPSULE, DELAYED RELEASE ORAL EVERY OTHER DAY
Qty: 45 CAPSULE | Refills: 0 | Status: SHIPPED | OUTPATIENT
Start: 2024-06-24

## 2024-06-24 RX ORDER — PANTOPRAZOLE SODIUM 40 MG/1
40 TABLET, DELAYED RELEASE ORAL DAILY
Qty: 90 TABLET | Refills: 0 | Status: SHIPPED | OUTPATIENT
Start: 2024-06-24

## 2024-06-24 NOTE — TELEPHONE ENCOUNTER
----- Message from [facility] sent at 6/24/2024  1:51 AM CDT -----  Regarding: Refill medication   Contact: [phone]  I’m needing a refill on my cymbalta and pantoprazole, can you send it to [facility] [address]? We are on vacation and I thought I would have enough left in my current prescription but I did not.

## 2024-06-24 NOTE — TELEPHONE ENCOUNTER
From: Curtis Yepez   To: Delilah Kulkarni  Sent: 6/24/2024 1:51 AM CDT  Subject: Refill medication     I’m needing a refill on my cymbalta and pantoprazole, can you send it to [facility] [address] [place] [address]? We are on vacation and I thought I would have enough left in my current prescription but I did not.

## 2024-06-24 NOTE — TELEPHONE ENCOUNTER
Rx Refill Note  Requested Prescriptions     Pending Prescriptions Disp Refills    DULoxetine (CYMBALTA) 60 MG capsule 45 capsule 0     Sig: Take 1 capsule by mouth Every Other Day.    pantoprazole (Protonix) 40 MG EC tablet 90 tablet 0     Sig: Take 1 tablet by mouth Daily.      Last office visit with prescribing clinician: 5/30/2024   Last telemedicine visit with prescribing clinician: Visit date not found   Next office visit with prescribing clinician: 2/11/2025                         Would you like a call back once the refill request has been completed: [] Yes [] No    If the office needs to give you a call back, can they leave a voicemail: [] Yes [] No    Violeta Cruz MA  06/24/24, 08:37 CDT

## 2024-06-26 NOTE — PROGRESS NOTES
Subjective    Mr. Garsia is 28 y.o. male    Chief Complaint: Nephrolithiasis    History of Present Illness    28-year-old male established patient in for 6-month follow-up regarding nephrolithiasis.  Denies flank pain or hematuria.  Last with spontaneous passage 4 mm right ureteral stone.  Previous tissue pathology calcium oxalate and calcium phosphate.  KUB today no stones visualized.  Bothersome LUTS consisting of urinary hesitancy and intermittency     History of Chiari malformation, shunt placement    The following portions of the patient's history were reviewed and updated as appropriate: allergies, current medications, past family history, past medical history, past social history, past surgical history and problem list.    Review of Systems   Constitutional:  Negative for chills and fever.   Gastrointestinal:  Negative for abdominal pain, anal bleeding, blood in stool, nausea and vomiting.   Genitourinary:  Negative for dysuria, flank pain and hematuria.         Current Outpatient Medications:     amphetamine-dextroamphetamine (ADDERALL) 10 MG tablet, Take 1 tablet by mouth 2 (Two) Times a Day., Disp: , Rfl:     butalbital-acetaminophen-caffeine (FIORICET, ESGIC) -40 MG per tablet, Every 4 (Four) Hours., Disp: , Rfl:     clonazePAM (KlonoPIN) 0.5 MG tablet, Take 1 tablet by mouth 2 (Two) Times a Day., Disp: 60 tablet, Rfl: 0    divalproex (DEPAKOTE ER) 500 MG 24 hr tablet, Take 2 tablets by mouth Daily., Disp: , Rfl:     DULoxetine (CYMBALTA) 60 MG capsule, Take 1 capsule by mouth Every Other Day., Disp: 45 capsule, Rfl: 0    Emgality 120 MG/ML auto-injector pen, INJECT 2 PENS SUBCUTANEOUSLY ONCE AS A LOADING DOSE, Disp: , Rfl:     famotidine (Pepcid) 20 MG tablet, Take 1 tablet by mouth 2 (Two) Times a Day As Needed for Heartburn., Disp: 60 tablet, Rfl: 2    gabapentin (NEURONTIN) 600 MG tablet, Take 1 tablet by mouth 4 (Four) Times a Day., Disp: , Rfl:     levothyroxine (SYNTHROID, LEVOTHROID) 25 MCG  tablet, Take 1 tablet by mouth Every Morning., Disp: 90 tablet, Rfl: 3    losartan (COZAAR) 25 MG tablet, Take 1 tablet by mouth Daily., Disp: 90 tablet, Rfl: 3    Melatonin 10 MG tablet, as directed Orally every night, Disp: , Rfl:     ondansetron ODT (ZOFRAN-ODT) 4 MG disintegrating tablet, 1 tablet on the tongue and allow to dissolve Orally prn, Disp: , Rfl:     pantoprazole (Protonix) 40 MG EC tablet, Take 1 tablet by mouth Daily., Disp: 90 tablet, Rfl: 0    tamsulosin (FLOMAX) 0.4 MG capsule 24 hr capsule, TAKE 1 CAPSULE BY MOUTH AT BEDTIME ONCE DAILY, Disp: 90 capsule, Rfl: 1    traZODone (DESYREL) 50 MG tablet, TAKE 1 TO 2 TABLETS BY MOUTH NIGHTLY FOR SLEEP, Disp: 60 tablet, Rfl: 6    Ubrelvy 100 MG tablet, TAKE 1 TABLET BY MOUTH AT ONSET OF HEADACHE, Disp: , Rfl:     Current Facility-Administered Medications:     triamcinolone acetonide (KENALOG-40) injection 40 mg, 40 mg, Intramuscular, Once, Delilah Kulkarni, APRN    Past Medical History:   Diagnosis Date    Anxiety     Asthma     AS A CHILD     Balance disorder     Chiari I malformation     Cholelithiasis        Past Surgical History:   Procedure Laterality Date    NERVE DECOMPRESSION  2011    SHUNT REVISION         Social History     Socioeconomic History    Marital status:    Tobacco Use    Smoking status: Never     Passive exposure: Past    Smokeless tobacco: Never   Vaping Use    Vaping status: Never Used   Substance and Sexual Activity    Alcohol use: Not Currently    Drug use: Never    Sexual activity: Yes       Family History   Problem Relation Age of Onset    Chiari malformation Mother     Stroke Mother     Heart disease Mother     ADD / ADHD Father     Heart disease Father     Heart disease Brother     Thyroid disease Maternal Aunt     Heart disease Maternal Aunt     Hyperlipidemia Maternal Uncle     No Known Problems Paternal Aunt     ADD / ADHD Paternal Uncle     Heart disease Paternal Uncle     Diabetes Paternal Uncle     No Known  "Problems Maternal Grandmother     Heart disease Maternal Grandfather     Diabetes Paternal Grandmother     Heart disease Paternal Grandfather        Objective    Temp 97.5 °F (36.4 °C)   Ht 172.7 cm (67.99\")   Wt 94.8 kg (209 lb)   BMI 31.79 kg/m²     Physical Exam  Constitutional:       Appearance: Normal appearance.   Abdominal:      Tenderness: There is no right CVA tenderness or left CVA tenderness.   Skin:     General: Skin is warm and dry.   Neurological:      Mental Status: He is alert and oriented to person, place, and time.   Psychiatric:         Mood and Affect: Mood normal.         Behavior: Behavior normal.             Results for orders placed or performed in visit on 07/08/24   POC Urinalysis Dipstick, Multipro    Specimen: Urine   Result Value Ref Range    Color Yellow Yellow, Straw, Dark Yellow, Lacie    Clarity, UA Clear Clear    Glucose, UA Negative Negative mg/dL    Bilirubin Negative Negative    Ketones, UA Negative Negative    Specific Gravity  1.020 1.005 - 1.030    Blood, UA Negative Negative    pH, Urine 7.5 5.0 - 8.0    Protein, POC Negative Negative mg/dL    Urobilinogen, UA 0.2 E.U./dL Normal, 0.2 E.U./dL    Nitrite, UA Negative Negative    Leukocytes Negative Negative   KUB independent review    A KUB is available for me to review today.  The image is inspected for a bowel gas pattern and the general bone structure of the spine and pelvis. The kidneys are then inspected closely.  Renal outline is noted if identifiable. The kidney, collecting system, and anticipated path of the ureter are examined for calcifications including those in the true pelvis.  This film reveals:    On the right there are no calcificaitons seen in the kidney or the expected course of the ureter. .    On the left there are no calcificaitons seen in the kidney or the expected course of the ureter. .    Assessment and Plan    Diagnoses and all orders for this visit:    1. Urinary hesitancy (Primary)  -     POC " Urinalysis Dipstick, Multipro    2. Right ureteral stone  -     POC Urinalysis Dipstick, Multipro  -     XR abdomen kub; Future  -     XR Abdomen KUB; Future         we will continue the tamsulosin 0.4 mg twice daily and if symptoms worsen recommend further evaluation with cystoscopy     KUB today no stones visualized     F/u in 1 year

## 2024-07-05 ENCOUNTER — TELEPHONE (OUTPATIENT)
Dept: UROLOGY | Facility: CLINIC | Age: 29
End: 2024-07-05
Payer: MEDICARE

## 2024-07-05 NOTE — TELEPHONE ENCOUNTER
Called Patient to remind them to get KUB prior to appointment.  Spoke with Patient's wife.  If patient calls back it is ok for the HUB to tell the pt the message.

## 2024-07-08 ENCOUNTER — HOSPITAL ENCOUNTER (OUTPATIENT)
Dept: GENERAL RADIOLOGY | Facility: HOSPITAL | Age: 29
Discharge: HOME | End: 2024-07-08
Payer: MEDICARE

## 2024-07-08 ENCOUNTER — OFFICE VISIT (OUTPATIENT)
Dept: UROLOGY | Facility: CLINIC | Age: 29
End: 2024-07-08
Payer: MEDICARE

## 2024-07-08 VITALS — BODY MASS INDEX: 31.67 KG/M2 | HEIGHT: 68 IN | TEMPERATURE: 97.5 F | WEIGHT: 209 LBS

## 2024-07-08 DIAGNOSIS — N20.1 RIGHT URETERAL STONE: ICD-10-CM

## 2024-07-08 DIAGNOSIS — R39.11 URINARY HESITANCY: Primary | ICD-10-CM

## 2024-07-08 LAB
BILIRUB BLD-MCNC: NEGATIVE MG/DL
CLARITY, POC: CLEAR
COLOR UR: YELLOW
GLUCOSE UR STRIP-MCNC: NEGATIVE MG/DL
KETONES UR QL: NEGATIVE
LEUKOCYTE EST, POC: NEGATIVE
NITRITE UR-MCNC: NEGATIVE MG/ML
PH UR: 7.5 [PH] (ref 5–8)
PROT UR STRIP-MCNC: NEGATIVE MG/DL
RBC # UR STRIP: NEGATIVE /UL
SP GR UR: 1.02 (ref 1–1.03)
UROBILINOGEN UR QL: NORMAL

## 2024-07-08 PROCEDURE — 81001 URINALYSIS AUTO W/SCOPE: CPT

## 2024-07-08 PROCEDURE — 99214 OFFICE O/P EST MOD 30 MIN: CPT

## 2024-07-08 PROCEDURE — 1159F MED LIST DOCD IN RCRD: CPT

## 2024-07-08 PROCEDURE — 74018 RADEX ABDOMEN 1 VIEW: CPT

## 2024-07-08 PROCEDURE — 1160F RVW MEDS BY RX/DR IN RCRD: CPT

## 2024-07-08 RX ORDER — BUTALBITAL, ACETAMINOPHEN AND CAFFEINE 50; 325; 40 MG/1; MG/1; MG/1
TABLET ORAL
COMMUNITY

## 2024-07-08 RX ORDER — PHENOL 1.4 %
AEROSOL, SPRAY (ML) MUCOUS MEMBRANE
COMMUNITY

## 2024-07-08 RX ORDER — ONDANSETRON 4 MG/1
TABLET, ORALLY DISINTEGRATING ORAL
COMMUNITY
End: 2024-07-10 | Stop reason: SDUPTHER

## 2024-07-08 NOTE — TELEPHONE ENCOUNTER
Haily called back in stating that she has never used a different pharmacy then [facility] pharmacy because she can't drive. Patient state she never had it transferred to another pharmacy and had it filled.   Wife advised needs appt.  Appt scheduled.

## 2024-07-10 DIAGNOSIS — F32.89 OTHER DEPRESSION: ICD-10-CM

## 2024-07-10 RX ORDER — DULOXETIN HYDROCHLORIDE 60 MG/1
60 CAPSULE, DELAYED RELEASE ORAL EVERY OTHER DAY
Qty: 45 CAPSULE | Refills: 0 | Status: SHIPPED | OUTPATIENT
Start: 2024-07-10 | End: 2024-07-10 | Stop reason: SDUPTHER

## 2024-07-10 RX ORDER — ONDANSETRON 4 MG/1
4 TABLET, ORALLY DISINTEGRATING ORAL EVERY 8 HOURS PRN
Qty: 15 TABLET | Refills: 0 | Status: SHIPPED | OUTPATIENT
Start: 2024-07-10

## 2024-07-10 RX ORDER — DULOXETIN HYDROCHLORIDE 60 MG/1
60 CAPSULE, DELAYED RELEASE ORAL 2 TIMES DAILY
Qty: 180 CAPSULE | Refills: 2 | Status: SHIPPED | OUTPATIENT
Start: 2024-07-10

## 2024-07-10 NOTE — TELEPHONE ENCOUNTER
Rx Refill Note  Requested Prescriptions     Pending Prescriptions Disp Refills    DULoxetine (CYMBALTA) 60 MG capsule 45 capsule 0     Sig: Take 1 capsule by mouth Every Other Day.    ondansetron ODT (ZOFRAN-ODT) 4 MG disintegrating tablet        Last office visit with prescribing clinician: 5/30/2024   Last telemedicine visit with prescribing clinician: Visit date not found   Next office visit with prescribing clinician: 2/11/2025                         Would you like a call back once the refill request has been completed: [] Yes [] No    If the office needs to give you a call back, can they leave a voicemail: [] Yes [] No    Jennifer Harmon, PCT  07/10/24, 09:19 CDT

## 2024-07-30 DIAGNOSIS — F41.9 ANXIETY: ICD-10-CM

## 2024-07-30 RX ORDER — CLONAZEPAM 0.5 MG/1
0.5 TABLET ORAL 2 TIMES DAILY
Qty: 180 TABLET | Refills: 1 | Status: SHIPPED | OUTPATIENT
Start: 2024-07-30

## 2024-07-30 NOTE — TELEPHONE ENCOUNTER
Rx Refill Note  Requested Prescriptions     Pending Prescriptions Disp Refills    clonazePAM (KlonoPIN) 0.5 MG tablet 60 tablet 0     Sig: Take 1 tablet by mouth 2 (Two) Times a Day.      Last office visit with prescribing clinician: 5/30/2024   Last telemedicine visit with prescribing clinician: Visit date not found   Next office visit with prescribing clinician: 2/11/2025   CPE done 2/05/2024    {TIP  Please add Last Relevant Lab Date if appropriate: 2/05/2023             {TIP  Is Refill Pharmacy correct?: yes    Would you like a call back once the refill request has been completed: [] Yes [x] No    If the office needs to give you a call back, can they leave a voicemail: [] Yes [] No    Violeta Cruz MA  07/30/24, 11:04 CDT

## 2024-09-24 RX ORDER — LOSARTAN POTASSIUM 25 MG/1
25 TABLET ORAL DAILY
Qty: 90 TABLET | Refills: 0 | Status: SHIPPED | OUTPATIENT
Start: 2024-09-24

## 2024-10-01 ENCOUNTER — OFFICE VISIT (OUTPATIENT)
Dept: FAMILY MEDICINE CLINIC | Facility: CLINIC | Age: 29
End: 2024-10-01
Payer: MEDICARE

## 2024-10-01 VITALS
OXYGEN SATURATION: 97 % | WEIGHT: 200.6 LBS | DIASTOLIC BLOOD PRESSURE: 89 MMHG | SYSTOLIC BLOOD PRESSURE: 144 MMHG | TEMPERATURE: 98.4 F | BODY MASS INDEX: 30.4 KG/M2 | HEIGHT: 68 IN | HEART RATE: 82 BPM

## 2024-10-01 DIAGNOSIS — M77.9 TENDONITIS: Primary | ICD-10-CM

## 2024-10-01 PROCEDURE — 1160F RVW MEDS BY RX/DR IN RCRD: CPT | Performed by: NURSE PRACTITIONER

## 2024-10-01 PROCEDURE — 96372 THER/PROPH/DIAG INJ SC/IM: CPT | Performed by: NURSE PRACTITIONER

## 2024-10-01 PROCEDURE — 3077F SYST BP >= 140 MM HG: CPT | Performed by: NURSE PRACTITIONER

## 2024-10-01 PROCEDURE — 1159F MED LIST DOCD IN RCRD: CPT | Performed by: NURSE PRACTITIONER

## 2024-10-01 PROCEDURE — 99213 OFFICE O/P EST LOW 20 MIN: CPT | Performed by: NURSE PRACTITIONER

## 2024-10-01 PROCEDURE — 1125F AMNT PAIN NOTED PAIN PRSNT: CPT | Performed by: NURSE PRACTITIONER

## 2024-10-01 PROCEDURE — 3079F DIAST BP 80-89 MM HG: CPT | Performed by: NURSE PRACTITIONER

## 2024-10-01 RX ORDER — PREDNISONE 10 MG/1
TABLET ORAL
Qty: 21 TABLET | Refills: 0 | Status: SHIPPED | OUTPATIENT
Start: 2024-10-01

## 2024-10-01 RX ORDER — TRIAMCINOLONE ACETONIDE 40 MG/ML
40 INJECTION, SUSPENSION INTRA-ARTICULAR; INTRAMUSCULAR ONCE
Status: COMPLETED | OUTPATIENT
Start: 2024-10-01 | End: 2024-10-01

## 2024-10-01 RX ORDER — PROPRANOLOL HCL 60 MG
1 CAPSULE, EXTENDED RELEASE 24HR ORAL DAILY
COMMUNITY
Start: 2024-09-25

## 2024-10-01 RX ADMIN — TRIAMCINOLONE ACETONIDE 40 MG: 40 INJECTION, SUSPENSION INTRA-ARTICULAR; INTRAMUSCULAR at 10:58

## 2024-10-01 NOTE — PROGRESS NOTES
CC: right elbow pain    History:  Curtis Garsia is a 28 y.o. male who presents today for evaluation of the above problems.      Patient presents for left elbow pain. Pain has been present for around 3-4 months, and is getting worse. Notes that he has been wearing a tennis elbow brace and this has not helped. Pain in worse with lifting or extending the arm. At times he is unable to lift items due to pain.    HPI  ROS:  Review of Systems   Musculoskeletal:         Right elbow pain       No Known Allergies  Past Medical History:   Diagnosis Date    Anxiety     Asthma     AS A CHILD     Balance disorder     Chiari I malformation     Cholelithiasis      Past Surgical History:   Procedure Laterality Date    NERVE DECOMPRESSION  2011    SHUNT REVISION       Family History   Problem Relation Age of Onset    Chiari malformation Mother     Stroke Mother     Heart disease Mother     ADD / ADHD Father     Heart disease Father     Heart disease Brother     Thyroid disease Maternal Aunt     Heart disease Maternal Aunt     Hyperlipidemia Maternal Uncle     No Known Problems Paternal Aunt     ADD / ADHD Paternal Uncle     Heart disease Paternal Uncle     Diabetes Paternal Uncle     No Known Problems Maternal Grandmother     Heart disease Maternal Grandfather     Diabetes Paternal Grandmother     Heart disease Paternal Grandfather       reports that he has never smoked. He has been exposed to tobacco smoke. He has never used smokeless tobacco. He reports that he does not currently use alcohol. He reports that he does not use drugs.      Current Outpatient Medications:     amphetamine-dextroamphetamine (ADDERALL) 10 MG tablet, Take 1 tablet by mouth 2 (Two) Times a Day., Disp: , Rfl:     butalbital-acetaminophen-caffeine (FIORICET, ESGIC) -40 MG per tablet, Every 4 (Four) Hours., Disp: , Rfl:     clonazePAM (KlonoPIN) 0.5 MG tablet, Take 1 tablet by mouth 2 (Two) Times a Day., Disp: 180 tablet, Rfl: 1    divalproex  "(DEPAKOTE ER) 500 MG 24 hr tablet, Take 2 tablets by mouth Daily., Disp: , Rfl:     DULoxetine (CYMBALTA) 60 MG capsule, Take 1 capsule by mouth 2 (Two) Times a Day., Disp: 180 capsule, Rfl: 2    Emgality 120 MG/ML auto-injector pen, INJECT 2 PENS SUBCUTANEOUSLY ONCE AS A LOADING DOSE, Disp: , Rfl:     famotidine (Pepcid) 20 MG tablet, Take 1 tablet by mouth 2 (Two) Times a Day As Needed for Heartburn., Disp: 60 tablet, Rfl: 2    gabapentin (NEURONTIN) 600 MG tablet, Take 1 tablet by mouth 4 (Four) Times a Day., Disp: , Rfl:     levothyroxine (SYNTHROID, LEVOTHROID) 25 MCG tablet, Take 1 tablet by mouth Every Morning., Disp: 90 tablet, Rfl: 3    losartan (COZAAR) 25 MG tablet, Take 1 tablet by mouth once daily, Disp: 90 tablet, Rfl: 0    Melatonin 10 MG tablet, as directed Orally every night, Disp: , Rfl:     ondansetron ODT (ZOFRAN-ODT) 4 MG disintegrating tablet, Place 1 tablet on the tongue Every 8 (Eight) Hours As Needed for Nausea or Vomiting., Disp: 15 tablet, Rfl: 0    pantoprazole (Protonix) 40 MG EC tablet, Take 1 tablet by mouth Daily., Disp: 90 tablet, Rfl: 0    propranolol LA (INDERAL LA) 60 MG 24 hr capsule, Take 1 capsule by mouth Daily., Disp: , Rfl:     traZODone (DESYREL) 50 MG tablet, TAKE 1 TO 2 TABLETS BY MOUTH NIGHTLY FOR SLEEP, Disp: 60 tablet, Rfl: 6    Ubrelvy 100 MG tablet, TAKE 1 TABLET BY MOUTH AT ONSET OF HEADACHE, Disp: , Rfl:     predniSONE (DELTASONE) 10 MG (21) dose pack, Use as directed on package, Disp: 21 tablet, Rfl: 0    Current Facility-Administered Medications:     triamcinolone acetonide (KENALOG-40) injection 40 mg, 40 mg, Intramuscular, Once, Delilah Kulkarni, APRN    OBJECTIVE:  /89 (BP Location: Left arm, Patient Position: Sitting, Cuff Size: Adult)   Pulse 82   Temp 98.4 °F (36.9 °C) (Temporal)   Ht 172.7 cm (68\")   Wt 91 kg (200 lb 9.6 oz)   SpO2 97%   BMI 30.50 kg/m²    Physical Exam  Vitals reviewed.   Constitutional:       Appearance: He is well-developed. "   Cardiovascular:      Rate and Rhythm: Normal rate.   Pulmonary:      Effort: Pulmonary effort is normal.   Musculoskeletal:        Arms:    Neurological:      Mental Status: He is alert and oriented to person, place, and time.   Psychiatric:         Behavior: Behavior normal.         Assessment/Plan    Diagnoses and all orders for this visit:    1. Tendonitis (Primary)  -     triamcinolone acetonide (KENALOG-40) injection 40 mg  -     predniSONE (DELTASONE) 10 MG (21) dose pack; Use as directed on package  Dispense: 21 tablet; Refill: 0  -     Ambulatory Referral to Orthopedic Surgery        An After Visit Summary was printed and given to the patient at discharge.  Return if symptoms worsen or fail to improve, for Next scheduled follow up.       FRANKY Fitzgerald 10/1/24    Electronically signed.

## 2024-10-09 RX ORDER — ONDANSETRON 4 MG/1
4 TABLET, ORALLY DISINTEGRATING ORAL EVERY 8 HOURS PRN
Qty: 15 TABLET | Refills: 2 | Status: SHIPPED | OUTPATIENT
Start: 2024-10-09

## 2024-10-09 NOTE — TELEPHONE ENCOUNTER
Rx Refill Note  Requested Prescriptions     Pending Prescriptions Disp Refills    ondansetron ODT (ZOFRAN-ODT) 4 MG disintegrating tablet 15 tablet 0     Sig: Place 1 tablet on the tongue Every 8 (Eight) Hours As Needed for Nausea or Vomiting.      Last office visit with prescribing clinician: 10/1/2024   Last telemedicine visit with prescribing clinician: Visit date not found   Next office visit with prescribing clinician: 2/11/2025     Violeta Rose MA  10/09/24, 12:43 CDT

## 2024-10-21 ENCOUNTER — OFFICE VISIT (OUTPATIENT)
Dept: FAMILY MEDICINE CLINIC | Facility: CLINIC | Age: 29
End: 2024-10-21
Payer: MEDICARE

## 2024-10-21 VITALS
HEART RATE: 117 BPM | DIASTOLIC BLOOD PRESSURE: 86 MMHG | SYSTOLIC BLOOD PRESSURE: 128 MMHG | BODY MASS INDEX: 29.86 KG/M2 | HEIGHT: 68 IN | OXYGEN SATURATION: 98 % | WEIGHT: 197 LBS | RESPIRATION RATE: 18 BRPM

## 2024-10-21 DIAGNOSIS — K21.9 GASTROESOPHAGEAL REFLUX DISEASE, UNSPECIFIED WHETHER ESOPHAGITIS PRESENT: Primary | ICD-10-CM

## 2024-10-21 DIAGNOSIS — F19.982 DRUG-INDUCED INSOMNIA: ICD-10-CM

## 2024-10-21 PROCEDURE — 99213 OFFICE O/P EST LOW 20 MIN: CPT | Performed by: NURSE PRACTITIONER

## 2024-10-21 PROCEDURE — 1126F AMNT PAIN NOTED NONE PRSNT: CPT | Performed by: NURSE PRACTITIONER

## 2024-10-21 PROCEDURE — 3074F SYST BP LT 130 MM HG: CPT | Performed by: NURSE PRACTITIONER

## 2024-10-21 PROCEDURE — 1159F MED LIST DOCD IN RCRD: CPT | Performed by: NURSE PRACTITIONER

## 2024-10-21 PROCEDURE — 3079F DIAST BP 80-89 MM HG: CPT | Performed by: NURSE PRACTITIONER

## 2024-10-21 PROCEDURE — 1160F RVW MEDS BY RX/DR IN RCRD: CPT | Performed by: NURSE PRACTITIONER

## 2024-10-21 RX ORDER — DEXLANSOPRAZOLE 60 MG/1
60 CAPSULE, DELAYED RELEASE ORAL DAILY
Qty: 30 CAPSULE | Refills: 0 | Status: SHIPPED | OUTPATIENT
Start: 2024-10-21 | End: 2024-11-20

## 2024-10-21 NOTE — PROGRESS NOTES
CC: sleep disturbance and GERD    History:  Curtis Garsia is a 28 y.o. male who presents today for evaluation of the above problems.      Patient reports that he is having issues sleeping. States that the trazodone is no longer helping and there are nights that he doesn't sleep at all. In addition to this, despite taking both pepcid and protonix, he is having increased heartburn with vomiting at times.     HPI  ROS:  Review of Systems   Gastrointestinal:  Positive for vomiting.        Heartburn   Psychiatric/Behavioral:  Positive for sleep disturbance.        No Known Allergies  Past Medical History:   Diagnosis Date    Anxiety     Asthma     AS A CHILD     Balance disorder     Chiari I malformation     Cholelithiasis      Past Surgical History:   Procedure Laterality Date    NERVE DECOMPRESSION  2011    SHUNT REVISION       Family History   Problem Relation Age of Onset    Chiari malformation Mother     Stroke Mother     Heart disease Mother     ADD / ADHD Father     Heart disease Father     Heart disease Brother     Thyroid disease Maternal Aunt     Heart disease Maternal Aunt     Hyperlipidemia Maternal Uncle     No Known Problems Paternal Aunt     ADD / ADHD Paternal Uncle     Heart disease Paternal Uncle     Diabetes Paternal Uncle     No Known Problems Maternal Grandmother     Heart disease Maternal Grandfather     Diabetes Paternal Grandmother     Heart disease Paternal Grandfather       reports that he has never smoked. He has been exposed to tobacco smoke. He has never used smokeless tobacco. He reports that he does not currently use alcohol. He reports that he does not use drugs.      Current Outpatient Medications:     amphetamine-dextroamphetamine (ADDERALL) 10 MG tablet, Take 1 tablet by mouth 2 (Two) Times a Day., Disp: , Rfl:     butalbital-acetaminophen-caffeine (FIORICET, ESGIC) -40 MG per tablet, Every 4 (Four) Hours., Disp: , Rfl:     clonazePAM (KlonoPIN) 0.5 MG tablet, Take 1 tablet  "by mouth 2 (Two) Times a Day., Disp: 180 tablet, Rfl: 1    divalproex (DEPAKOTE ER) 500 MG 24 hr tablet, Take 2 tablets by mouth Daily., Disp: , Rfl:     DULoxetine (CYMBALTA) 60 MG capsule, Take 1 capsule by mouth 2 (Two) Times a Day., Disp: 180 capsule, Rfl: 2    Emgality 120 MG/ML auto-injector pen, INJECT 2 PENS SUBCUTANEOUSLY ONCE AS A LOADING DOSE, Disp: , Rfl:     famotidine (Pepcid) 20 MG tablet, Take 1 tablet by mouth 2 (Two) Times a Day As Needed for Heartburn., Disp: 60 tablet, Rfl: 2    gabapentin (NEURONTIN) 600 MG tablet, Take 1 tablet by mouth 4 (Four) Times a Day., Disp: , Rfl:     levothyroxine (SYNTHROID, LEVOTHROID) 25 MCG tablet, Take 1 tablet by mouth Every Morning., Disp: 90 tablet, Rfl: 3    losartan (COZAAR) 25 MG tablet, Take 1 tablet by mouth once daily, Disp: 90 tablet, Rfl: 0    Melatonin 10 MG tablet, as directed Orally every night, Disp: , Rfl:     ondansetron ODT (ZOFRAN-ODT) 4 MG disintegrating tablet, Place 1 tablet on the tongue Every 8 (Eight) Hours As Needed for Nausea or Vomiting., Disp: 15 tablet, Rfl: 2    propranolol LA (INDERAL LA) 60 MG 24 hr capsule, Take 1 capsule by mouth Daily., Disp: , Rfl:     traZODone (DESYREL) 50 MG tablet, TAKE 1 TO 2 TABLETS BY MOUTH NIGHTLY FOR SLEEP, Disp: 60 tablet, Rfl: 6    Ubrelvy 100 MG tablet, TAKE 1 TABLET BY MOUTH AT ONSET OF HEADACHE, Disp: , Rfl:     dexlansoprazole (DEXILANT) 60 MG capsule, Take 1 capsule by mouth Daily for 30 days., Disp: 30 capsule, Rfl: 0    Current Facility-Administered Medications:     triamcinolone acetonide (KENALOG-40) injection 40 mg, 40 mg, Intramuscular, Once, Delilah Kulkarni, APRN    OBJECTIVE:  /86 (BP Location: Left arm, Patient Position: Sitting, Cuff Size: Adult)   Pulse 117   Resp 18   Ht 172.7 cm (68\")   Wt 89.4 kg (197 lb)   SpO2 98%   BMI 29.95 kg/m²    Physical Exam  Vitals reviewed.   Constitutional:       Appearance: He is well-developed.   Cardiovascular:      Rate and Rhythm: " Normal rate.   Pulmonary:      Effort: Pulmonary effort is normal.   Neurological:      Mental Status: He is alert and oriented to person, place, and time.   Psychiatric:         Behavior: Behavior normal.         Assessment/Plan    Diagnoses and all orders for this visit:    1. Gastroesophageal reflux disease, unspecified whether esophagitis present (Primary)  -     Ambulatory Referral to Gastroenterology  -     dexlansoprazole (DEXILANT) 60 MG capsule; Take 1 capsule by mouth Daily for 30 days.  Dispense: 30 capsule; Refill: 0    2. Drug-induced insomnia    Advised patient that his sleep disturbance is likely related to his adderall. Needs to discuss with psychiatry. With him already taking gabapentin and clonazepam in addition to his adderall, I am not going to add any other sedative medication.     An After Visit Summary was printed and given to the patient at discharge.  Return if symptoms worsen or fail to improve, for Next scheduled follow up.       FRANKY Fitzgerald 10/21/24    Electronically signed.

## 2024-10-25 ENCOUNTER — PRIOR AUTHORIZATION (OUTPATIENT)
Dept: FAMILY MEDICINE CLINIC | Facility: CLINIC | Age: 29
End: 2024-10-25
Payer: MEDICARE

## 2024-10-25 NOTE — TELEPHONE ENCOUNTER
Outcome  Approved today by [facility]  CaseId:[id number];Status:Approved;Review Type:Prior Auth;Coverage Start Date:09/25/2024;Coverage End Date:10/25/2025;  Authorization Expiration Date: 10/24/2025

## 2024-12-24 RX ORDER — LOSARTAN POTASSIUM 25 MG/1
25 TABLET ORAL DAILY
Qty: 30 TABLET | Refills: 0 | Status: SHIPPED | OUTPATIENT
Start: 2024-12-24

## 2025-01-22 RX ORDER — LOSARTAN POTASSIUM 25 MG/1
25 TABLET ORAL DAILY
Qty: 30 TABLET | Refills: 0 | OUTPATIENT
Start: 2025-01-22

## 2025-01-23 ENCOUNTER — OFFICE VISIT (OUTPATIENT)
Dept: FAMILY MEDICINE CLINIC | Facility: CLINIC | Age: 30
End: 2025-01-23
Payer: MEDICARE

## 2025-01-23 VITALS
HEART RATE: 83 BPM | HEIGHT: 68 IN | BODY MASS INDEX: 28.19 KG/M2 | WEIGHT: 186 LBS | DIASTOLIC BLOOD PRESSURE: 79 MMHG | TEMPERATURE: 98.4 F | SYSTOLIC BLOOD PRESSURE: 135 MMHG | OXYGEN SATURATION: 96 %

## 2025-01-23 DIAGNOSIS — L25.9 CONTACT DERMATITIS, UNSPECIFIED CONTACT DERMATITIS TYPE, UNSPECIFIED TRIGGER: Primary | ICD-10-CM

## 2025-01-23 DIAGNOSIS — S61.212A LACERATION OF MIDDLE FINGER OF RIGHT HAND WITHOUT COMPLICATION, INITIAL ENCOUNTER: ICD-10-CM

## 2025-01-23 RX ORDER — LAMOTRIGINE 25 MG/1
25 TABLET ORAL NIGHTLY
COMMUNITY
Start: 2025-01-10

## 2025-01-23 RX ORDER — DEXLANSOPRAZOLE 60 MG/1
60 CAPSULE, DELAYED RELEASE ORAL DAILY
COMMUNITY

## 2025-01-23 RX ORDER — TRIAMCINOLONE ACETONIDE 1 MG/G
1 CREAM TOPICAL 2 TIMES DAILY
Qty: 30 G | Refills: 5 | Status: SHIPPED | OUTPATIENT
Start: 2025-01-23

## 2025-01-23 RX ORDER — TRIAMCINOLONE ACETONIDE 40 MG/ML
40 INJECTION, SUSPENSION INTRA-ARTICULAR; INTRAMUSCULAR ONCE
Status: COMPLETED | OUTPATIENT
Start: 2025-01-23 | End: 2025-01-23

## 2025-01-23 RX ADMIN — TRIAMCINOLONE ACETONIDE 40 MG: 40 INJECTION, SUSPENSION INTRA-ARTICULAR; INTRAMUSCULAR at 13:47

## 2025-01-23 NOTE — PROGRESS NOTES
Procedure   Suture Removal    Date/Time: 1/23/2025 2:24 PM    Performed by: Delilah Kulkarni APRN  Authorized by: Delilah Kulkarni APRN  Body area: upper extremity  Location details: right long finger  Wound Appearance: clean  Sutures Removed: 3  Patient tolerance: patient tolerated the procedure well with no immediate complications

## 2025-01-23 NOTE — PROGRESS NOTES
CC: Rash    History:  Curtis Garsia is a 29 y.o. male who presents today for evaluation of the above problems.      Patient notes that he has a rash on the left side of his head.  Initially noticed this yesterday.  States that his scalp is sensitive to the touch.  Describes it as feeling like sandpaper has irritated his skin.  As far as if he is just sitting in the rash is undisturbed it does not bother him.    Patient also has 3 sutures in his right middle finger that he needs to have removed.      Rash      ROS:  Review of Systems   Skin:  Positive for rash.       No Known Allergies  Past Medical History:   Diagnosis Date    Anxiety     Asthma     AS A CHILD     Balance disorder     Chiari I malformation     Cholelithiasis      Past Surgical History:   Procedure Laterality Date    NERVE DECOMPRESSION  2011    SHUNT REVISION       Family History   Problem Relation Age of Onset    Chiari malformation Mother     Stroke Mother     Heart disease Mother     ADD / ADHD Father     Heart disease Father     Heart disease Brother     Thyroid disease Maternal Aunt     Heart disease Maternal Aunt     Hyperlipidemia Maternal Uncle     No Known Problems Paternal Aunt     ADD / ADHD Paternal Uncle     Heart disease Paternal Uncle     Diabetes Paternal Uncle     No Known Problems Maternal Grandmother     Heart disease Maternal Grandfather     Diabetes Paternal Grandmother     Heart disease Paternal Grandfather       reports that he has never smoked. He has been exposed to tobacco smoke. He has never used smokeless tobacco. He reports that he does not currently use alcohol. He reports that he does not use drugs.      Current Outpatient Medications:     amphetamine-dextroamphetamine (ADDERALL) 10 MG tablet, Take 1 tablet by mouth 2 (Two) Times a Day., Disp: , Rfl:     clonazePAM (KlonoPIN) 0.5 MG tablet, Take 1 tablet by mouth 2 (Two) Times a Day., Disp: 180 tablet, Rfl: 1    dexlansoprazole (DEXILANT) 60 MG capsule, Take 1  "capsule by mouth Daily., Disp: , Rfl:     divalproex (DEPAKOTE ER) 500 MG 24 hr tablet, Take 2 tablets by mouth Daily., Disp: , Rfl:     DULoxetine (CYMBALTA) 60 MG capsule, Take 1 capsule by mouth 2 (Two) Times a Day., Disp: 180 capsule, Rfl: 2    Emgality 120 MG/ML auto-injector pen, INJECT 2 PENS SUBCUTANEOUSLY ONCE AS A LOADING DOSE, Disp: , Rfl:     famotidine (Pepcid) 20 MG tablet, Take 1 tablet by mouth 2 (Two) Times a Day As Needed for Heartburn., Disp: 60 tablet, Rfl: 2    gabapentin (NEURONTIN) 600 MG tablet, Take 1 tablet by mouth 4 (Four) Times a Day., Disp: , Rfl:     lamoTRIgine (LaMICtal) 25 MG tablet, Take 1 tablet by mouth Every Night., Disp: , Rfl:     levothyroxine (SYNTHROID, LEVOTHROID) 25 MCG tablet, Take 1 tablet by mouth Every Morning., Disp: 90 tablet, Rfl: 3    losartan (COZAAR) 25 MG tablet, Take 1 tablet by mouth once daily, Disp: 30 tablet, Rfl: 0    ondansetron ODT (ZOFRAN-ODT) 4 MG disintegrating tablet, Place 1 tablet on the tongue Every 8 (Eight) Hours As Needed for Nausea or Vomiting., Disp: 15 tablet, Rfl: 2    Ubrelvy 100 MG tablet, TAKE 1 TABLET BY MOUTH AT ONSET OF HEADACHE, Disp: , Rfl:     Vonoprazan Fumarate (VOQUEZNA) 20 MG tablet, Take 1 tablet by mouth Daily., Disp: , Rfl:     propranolol LA (INDERAL LA) 60 MG 24 hr capsule, Take 1 capsule by mouth Daily., Disp: , Rfl:     triamcinolone (KENALOG) 0.1 % cream, Apply 1 Application topically to the appropriate area as directed 2 (Two) Times a Day., Disp: 30 g, Rfl: 5  No current facility-administered medications for this visit.    OBJECTIVE:  /79 (BP Location: Right arm, Patient Position: Sitting, Cuff Size: Adult)   Pulse 83   Temp 98.4 °F (36.9 °C) (Temporal)   Ht 172.7 cm (68\")   Wt 84.4 kg (186 lb)   SpO2 96%   BMI 28.28 kg/m²    Physical Exam  HENT:      Head:           Assessment/Plan    Diagnoses and all orders for this visit:    1. Contact dermatitis, unspecified contact dermatitis type, unspecified " trigger (Primary)  -     triamcinolone acetonide (KENALOG-40) injection 40 mg  -     triamcinolone (KENALOG) 0.1 % cream; Apply 1 Application topically to the appropriate area as directed 2 (Two) Times a Day.  Dispense: 30 g; Refill: 5    2. Laceration of middle finger of right hand without complication, initial encounter  -     Suture Removal        An After Visit Summary was printed and given to the patient at discharge.  Return if symptoms worsen or fail to improve, for Next scheduled follow up.       Delilah WILKINS 1/23/2025    Electronically signed.

## 2025-01-30 ENCOUNTER — APPOINTMENT (OUTPATIENT)
Dept: GENERAL RADIOLOGY | Facility: HOSPITAL | Age: 30
End: 2025-01-30
Payer: MEDICARE

## 2025-01-30 ENCOUNTER — APPOINTMENT (OUTPATIENT)
Dept: CT IMAGING | Facility: HOSPITAL | Age: 30
End: 2025-01-30
Payer: MEDICARE

## 2025-01-30 ENCOUNTER — HOSPITAL ENCOUNTER (EMERGENCY)
Facility: HOSPITAL | Age: 30
Discharge: HOME | End: 2025-01-30
Payer: MEDICARE

## 2025-01-30 VITALS
HEIGHT: 68 IN | DIASTOLIC BLOOD PRESSURE: 68 MMHG | HEART RATE: 69 BPM | OXYGEN SATURATION: 97 % | BODY MASS INDEX: 28.04 KG/M2 | SYSTOLIC BLOOD PRESSURE: 114 MMHG | WEIGHT: 185 LBS | RESPIRATION RATE: 16 BRPM | TEMPERATURE: 98.8 F
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
## 2025-01-30 DIAGNOSIS — S00.83XA CONTUSION OF FACE, INITIAL ENCOUNTER: Primary | ICD-10-CM

## 2025-01-30 DIAGNOSIS — S16.1XXA STRAIN OF NECK MUSCLE, INITIAL ENCOUNTER: ICD-10-CM

## 2025-01-30 LAB
ALBUMIN SERPL-MCNC: 4.3 G/DL (ref 3.5–5.2)
ALBUMIN/GLOB SERPL: 1.3 G/DL
ALP SERPL-CCNC: 86 U/L (ref 39–117)
ALT SERPL W P-5'-P-CCNC: 11 U/L (ref 1–41)
ANION GAP SERPL CALCULATED.3IONS-SCNC: 13 MMOL/L (ref 5–15)
AST SERPL-CCNC: 10 U/L (ref 1–40)
BASOPHILS # BLD AUTO: 0.05 10*3/MM3 (ref 0–0.2)
BASOPHILS NFR BLD AUTO: 0.6 % (ref 0–1.5)
BILIRUB SERPL-MCNC: 0.3 MG/DL (ref 0–1.2)
BUN SERPL-MCNC: 15 MG/DL (ref 6–20)
BUN/CREAT SERPL: 19.5 (ref 7–25)
CALCIUM SPEC-SCNC: 9.6 MG/DL (ref 8.6–10.5)
CHLORIDE SERPL-SCNC: 100 MMOL/L (ref 98–107)
CO2 SERPL-SCNC: 23 MMOL/L (ref 22–29)
CREAT SERPL-MCNC: 0.77 MG/DL (ref 0.76–1.27)
DEPRECATED RDW RBC AUTO: 42.4 FL (ref 37–54)
EGFRCR SERPLBLD CKD-EPI 2021: 122.7 ML/MIN/1.73
EOSINOPHIL # BLD AUTO: 0.28 10*3/MM3 (ref 0–0.4)
EOSINOPHIL NFR BLD AUTO: 3.3 % (ref 0.3–6.2)
ERYTHROCYTE [DISTWIDTH] IN BLOOD BY AUTOMATED COUNT: 13.8 % (ref 12.3–15.4)
GLOBULIN UR ELPH-MCNC: 3.2 GM/DL
GLUCOSE SERPL-MCNC: 107 MG/DL (ref 65–99)
HCT VFR BLD AUTO: 39.8 % (ref 37.5–51)
HGB BLD-MCNC: 12.7 G/DL (ref 13–17.7)
IMM GRANULOCYTES # BLD AUTO: 0.02 10*3/MM3 (ref 0–0.05)
IMM GRANULOCYTES NFR BLD AUTO: 0.2 % (ref 0–0.5)
INR PPP: 0.96 (ref 0.91–1.09)
LYMPHOCYTES # BLD AUTO: 1.31 10*3/MM3 (ref 0.7–3.1)
LYMPHOCYTES NFR BLD AUTO: 15.6 % (ref 19.6–45.3)
MCH RBC QN AUTO: 26.8 PG (ref 26.6–33)
MCHC RBC AUTO-ENTMCNC: 31.9 G/DL (ref 31.5–35.7)
MCV RBC AUTO: 84 FL (ref 79–97)
MONOCYTES # BLD AUTO: 0.94 10*3/MM3 (ref 0.1–0.9)
MONOCYTES NFR BLD AUTO: 11.2 % (ref 5–12)
NEUTROPHILS NFR BLD AUTO: 5.82 10*3/MM3 (ref 1.7–7)
NEUTROPHILS NFR BLD AUTO: 69.1 % (ref 42.7–76)
NRBC BLD AUTO-RTO: 0 /100 WBC (ref 0–0.2)
PLATELET # BLD AUTO: 383 10*3/MM3 (ref 140–450)
PMV BLD AUTO: 8.1 FL (ref 6–12)
POTASSIUM SERPL-SCNC: 4.1 MMOL/L (ref 3.5–5.2)
PROT SERPL-MCNC: 7.5 G/DL (ref 6–8.5)
PROTHROMBIN TIME: 13.2 SECONDS (ref 11.8–14.8)
RBC # BLD AUTO: 4.74 10*6/MM3 (ref 4.14–5.8)
SODIUM SERPL-SCNC: 136 MMOL/L (ref 136–145)
WBC NRBC COR # BLD AUTO: 8.42 10*3/MM3 (ref 3.4–10.8)

## 2025-01-30 PROCEDURE — 73562 X-RAY EXAM OF KNEE 3: CPT

## 2025-01-30 PROCEDURE — 80053 COMPREHEN METABOLIC PANEL: CPT | Performed by: NURSE PRACTITIONER

## 2025-01-30 PROCEDURE — 85610 PROTHROMBIN TIME: CPT | Performed by: NURSE PRACTITIONER

## 2025-01-30 PROCEDURE — 85025 COMPLETE CBC W/AUTO DIFF WBC: CPT | Performed by: NURSE PRACTITIONER

## 2025-01-30 PROCEDURE — 72125 CT NECK SPINE W/O DYE: CPT

## 2025-01-30 PROCEDURE — 36415 COLL VENOUS BLD VENIPUNCTURE: CPT

## 2025-01-30 PROCEDURE — 70450 CT HEAD/BRAIN W/O DYE: CPT

## 2025-01-30 PROCEDURE — 99284 EMERGENCY DEPT VISIT MOD MDM: CPT

## 2025-01-30 PROCEDURE — 71045 X-RAY EXAM CHEST 1 VIEW: CPT

## 2025-01-30 RX ORDER — SODIUM CHLORIDE 0.9 % (FLUSH) 0.9 %
10 SYRINGE (ML) INJECTION AS NEEDED
Status: DISCONTINUED | OUTPATIENT
Start: 2025-01-30 | End: 2025-01-30 | Stop reason: HOSPADM

## 2025-01-30 RX ORDER — HYDROCODONE BITARTRATE AND ACETAMINOPHEN 5; 325 MG/1; MG/1
1 TABLET ORAL EVERY 6 HOURS PRN
Qty: 15 TABLET | Refills: 0 | Status: SHIPPED | OUTPATIENT
Start: 2025-01-30

## 2025-01-30 RX ORDER — CYCLOBENZAPRINE HCL 10 MG
10 TABLET ORAL 3 TIMES DAILY PRN
Qty: 20 TABLET | Refills: 0 | Status: SHIPPED | OUTPATIENT
Start: 2025-01-30

## 2025-01-30 RX ORDER — ONDANSETRON 4 MG/1
4 TABLET, ORALLY DISINTEGRATING ORAL EVERY 8 HOURS PRN
Qty: 15 TABLET | Refills: 2 | Status: SHIPPED | OUTPATIENT
Start: 2025-01-30

## 2025-01-30 NOTE — TELEPHONE ENCOUNTER
Rx Refill Note  Requested Prescriptions     Pending Prescriptions Disp Refills    ondansetron ODT (ZOFRAN-ODT) 4 MG disintegrating tablet 15 tablet 2     Sig: Place 1 tablet on the tongue Every 8 (Eight) Hours As Needed for Nausea or Vomiting.      Last office visit with prescribing clinician: Visit date not found   Last telemedicine visit with prescribing clinician: Visit date not found   Next office visit with prescribing clinician: Visit date not found   CPE done 2/05/2024                      Would you like a call back once the refill request has been completed: [] Yes [] No    If the office needs to give you a call back, can they leave a voicemail: [] Yes [] No    Violeta Cruz MA  01/30/25, 13:53 CST
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
## 2025-01-31 ENCOUNTER — PATIENT OUTREACH (OUTPATIENT)
Dept: CASE MANAGEMENT | Facility: OTHER | Age: 30
End: 2025-01-31
Payer: MEDICARE
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
## 2025-02-03 RX ORDER — LEVOTHYROXINE SODIUM 25 UG/1
25 TABLET ORAL EVERY MORNING
Qty: 90 TABLET | Refills: 0 | Status: SHIPPED | OUTPATIENT
Start: 2025-02-03

## 2025-02-03 NOTE — TELEPHONE ENCOUNTER
Rx Refill Note  Requested Prescriptions     Pending Prescriptions Disp Refills    levothyroxine (SYNTHROID, LEVOTHROID) 25 MCG tablet [Pharmacy Med Name: Levothyroxine Sodium 25 MCG Oral Tablet] 90 tablet 0     Sig: TAKE 1 TABLET BY MOUTH ONCE DAILY IN THE MORNING      Last office visit with prescribing clinician: 1/23/2025   Last telemedicine visit with prescribing clinician: Visit date not found   Next office visit with prescribing clinician: 2/11/2025       {TIP  Please add Last Relevant Lab 2/5/24    Violeta Rose MA  02/03/25, 16:40 CST

## 2025-02-12 ENCOUNTER — OFFICE VISIT (OUTPATIENT)
Dept: FAMILY MEDICINE CLINIC | Facility: CLINIC | Age: 30
End: 2025-02-12
Payer: MEDICARE

## 2025-02-12 VITALS
TEMPERATURE: 97.8 F | BODY MASS INDEX: 28.22 KG/M2 | WEIGHT: 186.2 LBS | DIASTOLIC BLOOD PRESSURE: 80 MMHG | HEART RATE: 89 BPM | SYSTOLIC BLOOD PRESSURE: 117 MMHG | HEIGHT: 68 IN | OXYGEN SATURATION: 99 %

## 2025-02-12 DIAGNOSIS — E66.3 OVERWEIGHT: ICD-10-CM

## 2025-02-12 DIAGNOSIS — E03.9 HYPOTHYROIDISM, UNSPECIFIED TYPE: ICD-10-CM

## 2025-02-12 DIAGNOSIS — R56.9 SEIZURES: ICD-10-CM

## 2025-02-12 DIAGNOSIS — I10 PRIMARY HYPERTENSION: ICD-10-CM

## 2025-02-12 DIAGNOSIS — Z51.81 THERAPEUTIC DRUG MONITORING: ICD-10-CM

## 2025-02-12 DIAGNOSIS — Z00.00 ANNUAL PHYSICAL EXAM: Primary | ICD-10-CM

## 2025-02-12 DIAGNOSIS — R53.83 FATIGUE, UNSPECIFIED TYPE: ICD-10-CM

## 2025-02-12 DIAGNOSIS — K21.9 GASTROESOPHAGEAL REFLUX DISEASE, UNSPECIFIED WHETHER ESOPHAGITIS PRESENT: ICD-10-CM

## 2025-02-12 DIAGNOSIS — F32.A DEPRESSIVE DISORDER: ICD-10-CM

## 2025-02-12 LAB
BILIRUB BLD-MCNC: NEGATIVE MG/DL
CLARITY, POC: CLEAR
COLOR UR: YELLOW
GLUCOSE UR STRIP-MCNC: NEGATIVE MG/DL
KETONES UR QL: NEGATIVE
LEUKOCYTE EST, POC: NEGATIVE
NITRITE UR-MCNC: NEGATIVE MG/ML
PH UR: 5.5 [PH] (ref 5–8)
PROT UR STRIP-MCNC: NEGATIVE MG/DL
RBC # UR STRIP: NEGATIVE /UL
SP GR UR: 1.02 (ref 1–1.03)
UROBILINOGEN UR QL: NORMAL
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
## 2025-02-13 DIAGNOSIS — K21.9 GASTROESOPHAGEAL REFLUX DISEASE, UNSPECIFIED WHETHER ESOPHAGITIS PRESENT: ICD-10-CM

## 2025-02-13 DIAGNOSIS — E03.9 HYPOTHYROIDISM, UNSPECIFIED TYPE: Primary | ICD-10-CM

## 2025-02-13 LAB
ALBUMIN SERPL-MCNC: 4.5 G/DL (ref 3.5–5.2)
ALBUMIN/GLOB SERPL: 1.4 G/DL
ALP SERPL-CCNC: 114 U/L (ref 39–117)
ALT SERPL-CCNC: 10 U/L (ref 1–41)
AST SERPL-CCNC: 12 U/L (ref 1–40)
BASOPHILS # BLD AUTO: 0.05 10*3/MM3 (ref 0–0.2)
BASOPHILS NFR BLD AUTO: 0.8 % (ref 0–1.5)
BILIRUB SERPL-MCNC: 0.4 MG/DL (ref 0–1.2)
BUN SERPL-MCNC: 13 MG/DL (ref 6–20)
BUN/CREAT SERPL: 14.3 (ref 7–25)
CALCIUM SERPL-MCNC: 9.8 MG/DL (ref 8.6–10.5)
CHLORIDE SERPL-SCNC: 102 MMOL/L (ref 98–107)
CHOLEST SERPL-MCNC: 174 MG/DL (ref 0–200)
CO2 SERPL-SCNC: 26.9 MMOL/L (ref 22–29)
CREAT SERPL-MCNC: 0.91 MG/DL (ref 0.76–1.27)
EGFRCR SERPLBLD CKD-EPI 2021: 115.6 ML/MIN/1.73
EOSINOPHIL # BLD AUTO: 0.28 10*3/MM3 (ref 0–0.4)
EOSINOPHIL NFR BLD AUTO: 4.2 % (ref 0.3–6.2)
ERYTHROCYTE [DISTWIDTH] IN BLOOD BY AUTOMATED COUNT: 13.1 % (ref 12.3–15.4)
GLOBULIN SER CALC-MCNC: 3.3 GM/DL
GLUCOSE SERPL-MCNC: 90 MG/DL (ref 65–99)
HCT VFR BLD AUTO: 41.7 % (ref 37.5–51)
HDLC SERPL-MCNC: 47 MG/DL (ref 40–60)
HGB BLD-MCNC: 13.6 G/DL (ref 13–17.7)
IMM GRANULOCYTES # BLD AUTO: 0.03 10*3/MM3 (ref 0–0.05)
IMM GRANULOCYTES NFR BLD AUTO: 0.5 % (ref 0–0.5)
LDLC SERPL CALC-MCNC: 111 MG/DL (ref 0–100)
LYMPHOCYTES # BLD AUTO: 1.38 10*3/MM3 (ref 0.7–3.1)
LYMPHOCYTES NFR BLD AUTO: 20.9 % (ref 19.6–45.3)
MCH RBC QN AUTO: 27.4 PG (ref 26.6–33)
MCHC RBC AUTO-ENTMCNC: 32.6 G/DL (ref 31.5–35.7)
MCV RBC AUTO: 83.9 FL (ref 79–97)
MONOCYTES # BLD AUTO: 0.82 10*3/MM3 (ref 0.1–0.9)
MONOCYTES NFR BLD AUTO: 12.4 % (ref 5–12)
NEUTROPHILS # BLD AUTO: 4.04 10*3/MM3 (ref 1.7–7)
NEUTROPHILS NFR BLD AUTO: 61.2 % (ref 42.7–76)
NRBC BLD AUTO-RTO: 0 /100 WBC (ref 0–0.2)
PLATELET # BLD AUTO: 388 10*3/MM3 (ref 140–450)
POTASSIUM SERPL-SCNC: 4.3 MMOL/L (ref 3.5–5.2)
PROT SERPL-MCNC: 7.8 G/DL (ref 6–8.5)
RBC # BLD AUTO: 4.97 10*6/MM3 (ref 4.14–5.8)
SODIUM SERPL-SCNC: 140 MMOL/L (ref 136–145)
T4 FREE SERPL-MCNC: 1.21 NG/DL (ref 0.92–1.68)
TRIGL SERPL-MCNC: 88 MG/DL (ref 0–150)
TSH SERPL DL<=0.005 MIU/L-ACNC: 5.13 UIU/ML (ref 0.27–4.2)
VLDLC SERPL CALC-MCNC: 16 MG/DL (ref 5–40)
WBC # BLD AUTO: 6.6 10*3/MM3 (ref 3.4–10.8)

## 2025-02-13 RX ORDER — LEVOTHYROXINE SODIUM 50 UG/1
50 TABLET ORAL
Qty: 90 TABLET | Refills: 3 | Status: SHIPPED | OUTPATIENT
Start: 2025-02-13

## 2025-02-13 RX ORDER — PANTOPRAZOLE SODIUM 40 MG/1
40 TABLET, DELAYED RELEASE ORAL DAILY
Qty: 90 TABLET | Refills: 3 | OUTPATIENT
Start: 2025-02-13

## 2025-02-13 RX ORDER — LEVOTHYROXINE SODIUM 50 UG/1
50 TABLET ORAL
Qty: 30 TABLET | Refills: 1 | Status: SHIPPED | OUTPATIENT
Start: 2025-02-13

## 2025-02-13 NOTE — TELEPHONE ENCOUNTER
Rx Refill Note  Requested Prescriptions     Pending Prescriptions Disp Refills    pantoprazole (PROTONIX) 40 MG EC tablet [Pharmacy Med Name: Pantoprazole Sodium 40 MG Oral Tablet Delayed Release] 90 tablet 0     Sig: Take 1 tablet by mouth once daily      Last office visit with prescribing clinician: 2/12/2025   Last telemedicine visit with prescribing clinician: Visit date not found   Next office visit with prescribing clinician: 5/12/2025     Violeta Rose MA  02/13/25, 07:32 CST

## 2025-02-17 LAB
1OH-MIDAZOLAM UR QL SCN: NOT DETECTED NG/MG CREAT
6MAM UR QL SCN: NEGATIVE NG/ML
7AMINOCLONAZEPAM/CREAT UR: 53 NG/MG CREAT
A-OH ALPRAZ/CREAT UR: NOT DETECTED NG/MG CREAT
A-OH-TRIAZOLAM/CREAT UR CFM: NOT DETECTED NG/MG CREAT
ALPRAZ/CREAT UR CFM: NOT DETECTED NG/MG CREAT
AMPHET UR CFM-MCNC: >4717 NG/MG CREAT
AMPHETAMINES UR QL SCN: NORMAL NG/ML
AMPHETAMINES UR QL: NORMAL
BARBITURATES UR QL SCN: NEGATIVE NG/ML
BENZODIAZ SCN METH UR: NORMAL
BUPRENORPHINE UR QL SCN: NEGATIVE
BUPRENORPHINE/CREAT UR: NOT DETECTED NG/MG CREAT
CANNABINOIDS UR QL SCN: NEGATIVE NG/ML
CLONAZEPAM/CREAT UR CFM: NOT DETECTED NG/MG CREAT
COCAINE+BZE UR QL SCN: NEGATIVE NG/ML
CREAT UR-MCNC: 212 MG/DL
DESALKYLFLURAZ/CREAT UR: NOT DETECTED NG/MG CREAT
DIAZEPAM/CREAT UR: NOT DETECTED NG/MG CREAT
ETHANOL UR QL SCN: NEGATIVE G/DL
FENTANYL CTO UR SCN-MCNC: NEGATIVE NG/ML
FENTANYL/CREAT UR: NOT DETECTED NG/MG CREAT
FLUNITRAZEPAM UR QL SCN: NOT DETECTED NG/MG CREAT
LORAZEPAM/CREAT UR: NOT DETECTED NG/MG CREAT
MDA UR CFM-MCNC: NOT DETECTED NG/MG CREAT
MDMA UR CFM-MCNC: NOT DETECTED NG/MG CREAT
METHADONE UR QL SCN: NEGATIVE NG/ML
METHADONE+METAB UR QL SCN: NEGATIVE NG/ML
METHAMPHET UR CFM-MCNC: NOT DETECTED NG/MG CREAT
MIDAZOLAM/CREAT UR CFM: NOT DETECTED NG/MG CREAT
NORBUPRENORPHINE/CREAT UR: NOT DETECTED NG/MG CREAT
NORDIAZEPAM/CREAT UR: NOT DETECTED NG/MG CREAT
NORFENTANYL/CREAT UR: NOT DETECTED NG/MG CREAT
NORFLUNITRAZEPAM UR-MCNC: NOT DETECTED NG/MG CREAT
OPIATES UR SCN-MCNC: NEGATIVE NG/ML
OXAZEPAM/CREAT UR: NOT DETECTED NG/MG CREAT
OXYCODONE CTO UR SCN-MCNC: NEGATIVE NG/ML
PCP UR QL SCN: NEGATIVE NG/ML
PRESCRIBED MEDICATIONS: NORMAL
TAPENTADOL CTO UR SCN-MCNC: NEGATIVE NG/ML
TEMAZEPAM/CREAT UR: NOT DETECTED NG/MG CREAT
TRAMADOL UR QL SCN: NEGATIVE NG/ML

## 2025-02-18 ENCOUNTER — OFFICE VISIT (OUTPATIENT)
Dept: FAMILY MEDICINE CLINIC | Facility: CLINIC | Age: 30
End: 2025-02-18
Payer: MEDICARE

## 2025-02-18 VITALS
HEIGHT: 68 IN | HEART RATE: 104 BPM | OXYGEN SATURATION: 98 % | WEIGHT: 188.6 LBS | TEMPERATURE: 98.6 F | DIASTOLIC BLOOD PRESSURE: 81 MMHG | SYSTOLIC BLOOD PRESSURE: 126 MMHG | BODY MASS INDEX: 28.58 KG/M2

## 2025-02-18 DIAGNOSIS — G89.29 CHRONIC MIDLINE LOW BACK PAIN, UNSPECIFIED WHETHER SCIATICA PRESENT: Primary | ICD-10-CM

## 2025-02-18 DIAGNOSIS — M54.50 CHRONIC MIDLINE LOW BACK PAIN, UNSPECIFIED WHETHER SCIATICA PRESENT: Primary | ICD-10-CM

## 2025-02-18 PROCEDURE — 3074F SYST BP LT 130 MM HG: CPT | Performed by: NURSE PRACTITIONER

## 2025-02-18 PROCEDURE — 1160F RVW MEDS BY RX/DR IN RCRD: CPT | Performed by: NURSE PRACTITIONER

## 2025-02-18 PROCEDURE — 1159F MED LIST DOCD IN RCRD: CPT | Performed by: NURSE PRACTITIONER

## 2025-02-18 PROCEDURE — 3079F DIAST BP 80-89 MM HG: CPT | Performed by: NURSE PRACTITIONER

## 2025-02-18 PROCEDURE — 1125F AMNT PAIN NOTED PAIN PRSNT: CPT | Performed by: NURSE PRACTITIONER

## 2025-02-18 PROCEDURE — 96372 THER/PROPH/DIAG INJ SC/IM: CPT | Performed by: NURSE PRACTITIONER

## 2025-02-18 PROCEDURE — 99213 OFFICE O/P EST LOW 20 MIN: CPT | Performed by: NURSE PRACTITIONER

## 2025-02-18 RX ORDER — HYDROCODONE BITARTRATE AND ACETAMINOPHEN 10; 325 MG/1; MG/1
1 TABLET ORAL EVERY 6 HOURS PRN
COMMUNITY

## 2025-02-18 RX ORDER — PREDNISONE 10 MG/1
TABLET ORAL
Qty: 21 TABLET | Refills: 0 | Status: SHIPPED | OUTPATIENT
Start: 2025-02-18

## 2025-02-18 RX ORDER — TRIAMCINOLONE ACETONIDE 40 MG/ML
40 INJECTION, SUSPENSION INTRA-ARTICULAR; INTRAMUSCULAR ONCE
Status: COMPLETED | OUTPATIENT
Start: 2025-02-18 | End: 2025-02-18

## 2025-02-18 RX ORDER — CYCLOBENZAPRINE HCL 10 MG
10 TABLET ORAL 3 TIMES DAILY PRN
COMMUNITY

## 2025-02-18 RX ADMIN — TRIAMCINOLONE ACETONIDE 40 MG: 40 INJECTION, SUSPENSION INTRA-ARTICULAR; INTRAMUSCULAR at 09:56
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
## 2025-02-21 ENCOUNTER — PATIENT MESSAGE (OUTPATIENT)
Dept: FAMILY MEDICINE CLINIC | Facility: CLINIC | Age: 30
End: 2025-02-21
Payer: COMMERCIAL
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
## 2025-02-25 RX ORDER — CYCLOBENZAPRINE HCL 10 MG
10 TABLET ORAL 3 TIMES DAILY PRN
Qty: 90 TABLET | Refills: 1 | Status: SHIPPED | OUTPATIENT
Start: 2025-02-25

## 2025-02-25 NOTE — TELEPHONE ENCOUNTER
Rx Refill Note  Requested Prescriptions     Pending Prescriptions Disp Refills    cyclobenzaprine (FLEXERIL) 10 MG tablet       Sig: Take 1 tablet by mouth 3 (Three) Times a Day As Needed for Muscle Spasms.      Last office visit with prescribing clinician: 2/18/2025   Last telemedicine visit with prescribing clinician: Visit date not found   Next office visit with prescribing clinician: 5/12/2025       Violeta Rose MA  02/25/25, 07:35 CST

## 2025-02-28 DIAGNOSIS — F41.9 ANXIETY: ICD-10-CM

## 2025-03-03 RX ORDER — CLONAZEPAM 0.5 MG/1
0.5 TABLET ORAL 2 TIMES DAILY
Qty: 180 TABLET | Refills: 0 | Status: SHIPPED | OUTPATIENT
Start: 2025-03-03

## 2025-03-03 NOTE — TELEPHONE ENCOUNTER
Rx Refill Note  Requested Prescriptions     Pending Prescriptions Disp Refills    clonazePAM (KlonoPIN) 0.5 MG tablet 180 tablet 1     Sig: Take 1 tablet by mouth 2 (Two) Times a Day.      Last office visit with prescribing clinician: Visit date not found   Last telemedicine visit with prescribing clinician: Visit date not found   Next office visit with prescribing clinician: Visit date not found   CPE done 2/12/2026    controlled med appt 2/11/25  contract 2/11/25  UDS 2/11/25                  {TIP  Is Refill Pharmacy correct?: yes    Would you like a call back once the refill request has been completed: [] Yes [] No    If the office needs to give you a call back, can they leave a voicemail: [] Yes [] No    Violeta Cruz MA  03/03/25, 08:21 CST

## 2025-05-12 ENCOUNTER — OFFICE VISIT (OUTPATIENT)
Dept: FAMILY MEDICINE CLINIC | Facility: CLINIC | Age: 30
End: 2025-05-12
Payer: MEDICARE

## 2025-05-12 VITALS
DIASTOLIC BLOOD PRESSURE: 87 MMHG | SYSTOLIC BLOOD PRESSURE: 130 MMHG | HEIGHT: 68 IN | RESPIRATION RATE: 16 BRPM | HEART RATE: 102 BPM | WEIGHT: 192 LBS | OXYGEN SATURATION: 98 % | BODY MASS INDEX: 29.1 KG/M2

## 2025-05-12 DIAGNOSIS — F41.9 ANXIETY: Primary | ICD-10-CM

## 2025-05-12 PROCEDURE — 3075F SYST BP GE 130 - 139MM HG: CPT | Performed by: NURSE PRACTITIONER

## 2025-05-12 PROCEDURE — 1125F AMNT PAIN NOTED PAIN PRSNT: CPT | Performed by: NURSE PRACTITIONER

## 2025-05-12 PROCEDURE — 3079F DIAST BP 80-89 MM HG: CPT | Performed by: NURSE PRACTITIONER

## 2025-05-12 PROCEDURE — 99213 OFFICE O/P EST LOW 20 MIN: CPT | Performed by: NURSE PRACTITIONER

## 2025-05-12 RX ORDER — ATOGEPANT 60 MG/1
1 TABLET ORAL DAILY
COMMUNITY
Start: 2025-04-25

## 2025-05-12 RX ORDER — BUTALBITAL, ACETAMINOPHEN AND CAFFEINE 50; 325; 40 MG/1; MG/1; MG/1
TABLET ORAL
COMMUNITY
Start: 2025-03-26

## 2025-05-12 NOTE — PROGRESS NOTES
CC: controlled med montoring - clonazepam    History:  Curtis Garsia is a 29 y.o. male who presents today for evaluation of the above problems.      Patient presents for controlled med monitoring. Reports that he is doing well. Continues on clonazepam for anxiety. No issues with treatmetn. Gopal is reviewed and appropriate. Contract and UDS are current. Patient is also on adderall and gabapentin from other providers.     HPI  ROS:  Review of Systems   Psychiatric/Behavioral:  The patient is not nervous/anxious.        No Known Allergies  Past Medical History:   Diagnosis Date    Anxiety     Asthma     AS A CHILD     Balance disorder     Chiari I malformation     Cholelithiasis      Past Surgical History:   Procedure Laterality Date    NERVE DECOMPRESSION  2011    SHUNT REVISION       Family History   Problem Relation Age of Onset    Chiari malformation Mother     Stroke Mother     Heart disease Mother     ADD / ADHD Father     Heart disease Father     Heart disease Brother     Thyroid disease Maternal Aunt     Heart disease Maternal Aunt     Hyperlipidemia Maternal Uncle     No Known Problems Paternal Aunt     ADD / ADHD Paternal Uncle     Heart disease Paternal Uncle     Diabetes Paternal Uncle     No Known Problems Maternal Grandmother     Heart disease Maternal Grandfather     Diabetes Paternal Grandmother     Heart disease Paternal Grandfather       reports that he has never smoked. He has been exposed to tobacco smoke. He has never used smokeless tobacco. He reports that he does not currently use alcohol. He reports that he does not use drugs.      Current Outpatient Medications:     amphetamine-dextroamphetamine (ADDERALL) 10 MG tablet, Take 1 tablet by mouth 2 (Two) Times a Day., Disp: , Rfl:     butalbital-acetaminophen-caffeine (FIORICET, ESGIC) -40 MG per tablet, TAKE 1 TABLET BY MOUTH AS NEEDED FOR HEADACHE NO MORE THAN 3 A WEEK, Disp: , Rfl:     clonazePAM (KlonoPIN) 0.5 MG tablet, Take 1  tablet by mouth 2 (Two) Times a Day., Disp: 180 tablet, Rfl: 0    cyclobenzaprine (FLEXERIL) 10 MG tablet, Take 1 tablet by mouth 3 (Three) Times a Day As Needed for Muscle Spasms., Disp: 90 tablet, Rfl: 1    dexlansoprazole (DEXILANT) 60 MG capsule, Take 1 capsule by mouth Daily., Disp: , Rfl:     divalproex (DEPAKOTE ER) 500 MG 24 hr tablet, Take 2 tablets by mouth Daily., Disp: , Rfl:     DULoxetine (CYMBALTA) 60 MG capsule, Take 1 capsule by mouth 2 (Two) Times a Day., Disp: 180 capsule, Rfl: 2    famotidine (Pepcid) 20 MG tablet, Take 1 tablet by mouth 2 (Two) Times a Day As Needed for Heartburn., Disp: 60 tablet, Rfl: 2    gabapentin (NEURONTIN) 600 MG tablet, Take 1 tablet by mouth 4 (Four) Times a Day., Disp: , Rfl:     HYDROcodone-acetaminophen (NORCO)  MG per tablet, Take 1 tablet by mouth Every 6 (Six) Hours As Needed for Moderate Pain or Severe Pain., Disp: , Rfl:     lamoTRIgine (LaMICtal) 25 MG tablet, Take 1 tablet by mouth Every Night., Disp: , Rfl:     levothyroxine (Synthroid) 50 MCG tablet, Take 1 tablet by mouth Every Morning., Disp: 30 tablet, Rfl: 1    levothyroxine (Synthroid) 50 MCG tablet, Take 1 tablet by mouth Every Morning Before Breakfast., Disp: 90 tablet, Rfl: 3    losartan (COZAAR) 25 MG tablet, Take 1 tablet by mouth once daily, Disp: 30 tablet, Rfl: 0    naloxone (NARCAN) 4 MG/0.1ML nasal spray, Call 911. Don't prime. [place] in 1 nostril for overdose. Repeat in 2-3 minutes in other nostril if no or minimal breathing/responsiveness., Disp: 2 each, Rfl: 0    ondansetron ODT (ZOFRAN-ODT) 4 MG disintegrating tablet, Place 1 tablet on the tongue Every 8 (Eight) Hours As Needed for Nausea or Vomiting., Disp: 15 tablet, Rfl: 2    predniSONE (DELTASONE) 10 MG (21) dose pack, Use as directed on package, Disp: 21 tablet, Rfl: 0    propranolol LA (INDERAL LA) 60 MG 24 hr capsule, Take 1 capsule by mouth Daily., Disp: , Rfl:     Qulipta 60 MG tablet, Take 1 tablet by mouth Daily., Disp: ,  "Rfl:     triamcinolone (KENALOG) 0.1 % cream, Apply 1 Application topically to the appropriate area as directed 2 (Two) Times a Day., Disp: 30 g, Rfl: 5    Vonoprazan Fumarate (VOQUEZNA) 20 MG tablet, Take 1 tablet by mouth Daily., Disp: , Rfl:     OBJECTIVE:  /87 (BP Location: Left arm, Patient Position: Sitting, Cuff Size: Large Adult)   Pulse 102   Resp 16   Ht 172.7 cm (68\")   Wt 87.1 kg (192 lb)   SpO2 98%   BMI 29.19 kg/m²    Physical Exam  Vitals reviewed.   Constitutional:       Appearance: He is well-developed.   Cardiovascular:      Rate and Rhythm: Normal rate.   Pulmonary:      Effort: Pulmonary effort is normal.   Neurological:      Mental Status: He is alert and oriented to person, place, and time.   Psychiatric:         Behavior: Behavior normal.         Assessment/Plan    Diagnoses and all orders for this visit:    1. Anxiety (Primary)    Anxiety well controlled on clonazepam and cymbalta. No changes to therapy at this time.     An After Visit Summary was printed and given to the patient at discharge.  Return in about 3 months (around 8/12/2025) for Next scheduled follow up.       FRANKY Fitzgerald 5/12/25    Electronically signed.  "

## 2025-05-13 ENCOUNTER — RESULTS FOLLOW-UP (OUTPATIENT)
Dept: FAMILY MEDICINE CLINIC | Facility: CLINIC | Age: 30
End: 2025-05-13
Payer: COMMERCIAL

## 2025-05-13 DIAGNOSIS — E03.9 ACQUIRED HYPOTHYROIDISM: Primary | ICD-10-CM

## 2025-05-13 RX ORDER — LEVOTHYROXINE SODIUM 75 UG/1
75 TABLET ORAL
Qty: 90 TABLET | Refills: 0 | Status: SHIPPED | OUTPATIENT
Start: 2025-05-13

## 2025-05-27 DIAGNOSIS — F32.89 OTHER DEPRESSION: ICD-10-CM

## 2025-05-27 RX ORDER — DULOXETIN HYDROCHLORIDE 60 MG/1
60 CAPSULE, DELAYED RELEASE ORAL 2 TIMES DAILY
Qty: 180 CAPSULE | Refills: 2 | Status: SHIPPED | OUTPATIENT
Start: 2025-05-27

## 2025-05-27 NOTE — TELEPHONE ENCOUNTER
Rx Refill Note  Requested Prescriptions     Pending Prescriptions Disp Refills    DULoxetine (CYMBALTA) 60 MG capsule [Pharmacy Med Name: DULoxetine HCl 60 MG Oral Capsule Delayed Release Particles] 180 capsule 0     Sig: Take 1 capsule by mouth twice daily      Last office visit with prescribing clinician: 5/12/2025   Last telemedicine visit with prescribing clinician: Visit date not found   Next office visit with prescribing clinician: 8/12/2025     Violeta Rose MA  05/27/25, 07:09 CDT

## 2025-06-10 ENCOUNTER — TELEPHONE (OUTPATIENT)
Dept: UROLOGY | Facility: CLINIC | Age: 30
End: 2025-06-10
Payer: COMMERCIAL

## 2025-06-10 NOTE — TELEPHONE ENCOUNTER
I called patient to let him know that we were cancelling appointment with Yamile on 7/7 due to her being out of the office. I left voicemail letting the patient know this and that we can reschedule. I asked him to call back to reschedule.    Ok for [facility] to confirm message and/or reschedule.

## 2025-06-12 NOTE — PROGRESS NOTES
CC: Upper respiratory illness    History:  Curtis Garsia is a 26 y.o. male who presents today for evaluation of the above problems.      Patient states that he developed symptoms on Sunday.  Has not ran a fever.  Complains of congestion, and postnasal drainage.  Has had some shortness of breath yesterday but this is better today also complains of cough.  Does have chronic headaches so this has not really seemed any different.    HPI  ROS:  Review of Systems   Constitutional: Negative for fever.   HENT: Positive for congestion and postnasal drip.    Respiratory: Positive for cough and shortness of breath.    Gastrointestinal: Negative for diarrhea.   Neurological: Positive for headaches.       No Known Allergies  Past Medical History:   Diagnosis Date   • Anxiety    • Asthma     AS A CHILD    • Balance disorder    • Chiari I malformation (HCC)    • Cholelithiasis      Past Surgical History:   Procedure Laterality Date   • NERVE DECOMPRESSION  2011   • SHUNT REVISION       Family History   Problem Relation Age of Onset   • Chiari malformation Mother    • Stroke Mother    • Heart disease Mother    • ADD / ADHD Father    • Heart disease Father    • Heart disease Brother    • Thyroid disease Maternal Aunt    • Heart disease Maternal Aunt    • Hyperlipidemia Maternal Uncle    • No Known Problems Paternal Aunt    • ADD / ADHD Paternal Uncle    • Heart disease Paternal Uncle    • Diabetes Paternal Uncle    • No Known Problems Maternal Grandmother    • Heart disease Maternal Grandfather    • Diabetes Paternal Grandmother    • Heart disease Paternal Grandfather       reports that he has never smoked. He has never used smokeless tobacco. He reports previous alcohol use. He reports that he does not use drugs.      Current Outpatient Medications:   •  Butalbital-APAP-Caff-Cod -33-30 MG capsule, Every 6 (Six) Hours., Disp: , Rfl:   •  clonazePAM (KlonoPIN) 1 MG tablet, Take 1 tablet by mouth Daily., Disp: 30 tablet,  "Rfl: 0  •  DULoxetine (CYMBALTA) 60 MG capsule, Take 1 capsule by mouth Daily., Disp: 90 capsule, Rfl: 3  •  gabapentin (NEURONTIN) 300 MG capsule, Take 300 mg by mouth 3 (Three) Times a Day., Disp: , Rfl:   •  guaiFENesin (MUCINEX) 600 MG 12 hr tablet, Take 1,200 mg by mouth Daily., Disp: , Rfl:   •  losartan (COZAAR) 25 MG tablet, Take 1 tablet by mouth Daily., Disp: 30 tablet, Rfl: 11  •  pantoprazole (Protonix) 40 MG EC tablet, Take 1 tablet by mouth Daily., Disp: 30 tablet, Rfl: 2  •  tiZANidine (ZANAFLEX) 4 MG tablet, Take 1 tablet by mouth At Night As Needed for Muscle Spasms., Disp: 30 tablet, Rfl: 2  •  topiramate (TOPAMAX) 100 MG tablet, Take 125 mg by mouth 2 (Two) Times a Day., Disp: , Rfl:   •  traZODone (DESYREL) 50 MG tablet, Take 1-2 tabs nightly for sleep., Disp: 60 tablet, Rfl: 0  •  amoxicillin-clavulanate (Augmentin) 875-125 MG per tablet, Take 1 tablet by mouth 2 (Two) Times a Day., Disp: 14 tablet, Rfl: 0  •  predniSONE (DELTASONE) 10 MG (21) dose pack, Use as directed on package, Disp: 21 tablet, Rfl: 0    Current Facility-Administered Medications:   •  triamcinolone acetonide (KENALOG-40) injection 40 mg, 40 mg, Intramuscular, Once, Delilah Kulkarni, APRN    OBJECTIVE:  /87 (BP Location: Left arm, Patient Position: Sitting, Cuff Size: Large Adult)   Pulse 101   Temp 98.9 °F (37.2 °C) (Temporal)   Resp 18   Ht 172.7 cm (68\")   Wt 98.4 kg (217 lb)   SpO2 98%   BMI 32.99 kg/m²    Physical Exam  Vitals reviewed.   Constitutional:       Appearance: He is well-developed.   Cardiovascular:      Rate and Rhythm: Normal rate and regular rhythm.      Heart sounds: Normal heart sounds.   Pulmonary:      Effort: Pulmonary effort is normal.      Breath sounds: Wheezing (Cleared with cough) present.   Neurological:      Mental Status: He is alert and oriented to person, place, and time.   Psychiatric:         Behavior: Behavior normal.         Assessment/Plan    Diagnoses and all orders for " this visit:    1. Acute non-recurrent maxillary sinusitis (Primary)  -     triamcinolone acetonide (KENALOG-40) injection 40 mg  -     amoxicillin-clavulanate (Augmentin) 875-125 MG per tablet; Take 1 tablet by mouth 2 (Two) Times a Day.  Dispense: 14 tablet; Refill: 0  -     predniSONE (DELTASONE) 10 MG (21) dose pack; Use as directed on package  Dispense: 21 tablet; Refill: 0  -     POCT SARS-CoV-2 Antigen ABI        An After Visit Summary was printed and given to the patient at discharge.  Return if symptoms worsen or fail to improve, for Next scheduled follow up.       FRANKY Fitzgerald 9/7/22    Electronically signed.   No

## 2025-06-30 DIAGNOSIS — F41.9 ANXIETY: ICD-10-CM

## 2025-06-30 RX ORDER — CLONAZEPAM 0.5 MG/1
0.5 TABLET ORAL 2 TIMES DAILY
Qty: 60 TABLET | Refills: 0 | Status: SHIPPED | OUTPATIENT
Start: 2025-06-30

## 2025-06-30 NOTE — TELEPHONE ENCOUNTER
Caller: YEHUDA SANTIAGO    Relationship: Emergency Contact    Best call back number: [phone]     Requested Prescriptions:   Requested Prescriptions     Pending Prescriptions Disp Refills    clonazePAM (KlonoPIN) 0.5 MG tablet 180 tablet 0     Sig: Take 1 tablet by mouth 2 (Two) Times a Day.        Pharmacy where request should be sent: [facility] [address] [place] [address] [address] [place] - [phone] [facility] [phone] FX     Last office visit with prescribing clinician: 5/12/2025   Last telemedicine visit with prescribing clinician: Visit date not found   Next office visit with prescribing clinician: 8/11/2025     Additional details provided by patient:     Does the patient have less than a 3 day supply:  [x] Yes  [] No    Would you like a call back once the refill request has been completed: [] Yes [] No    If the office needs to give you a call back, can they leave a voicemail: [] Yes [] No    Colleen Dyer Rep   06/30/25 09:05 CDT

## 2025-07-10 RX ORDER — ONDANSETRON 4 MG/1
4 TABLET, ORALLY DISINTEGRATING ORAL EVERY 8 HOURS PRN
Qty: 15 TABLET | Refills: 2 | Status: SHIPPED | OUTPATIENT
Start: 2025-07-10

## 2025-07-10 NOTE — TELEPHONE ENCOUNTER
Caller: YEHUDA SANTIAGO    Relationship: Emergency Contact    Best call back number:     [phone]       Requested Prescriptions:   Requested Prescriptions     Pending Prescriptions Disp Refills    ondansetron ODT (ZOFRAN-ODT) 4 MG disintegrating tablet 15 tablet 2     Sig: Place 1 tablet on the tongue Every 8 (Eight) Hours As Needed for Nausea or Vomiting.        Pharmacy where request should be sent: [facility] [address] [place] [address] [address] [facility] [phone] [facility] [phone] FX     Last office visit with prescribing clinician: 5/12/2025   Last telemedicine visit with prescribing clinician: Visit date not found   Next office visit with prescribing clinician: 8/11/2025     Additional details provided by patient: OUT    Does the patient have less than a 3 day supply:  [x] Yes  [] No    Would you like a call back once the refill request has been completed: [] Yes [x] No    If the office needs to give you a call back, can they leave a voicemail: [] Yes [x] No    Colleen oHuston Rep   07/10/25 09:07 CDT

## 2025-07-11 ENCOUNTER — TELEPHONE (OUTPATIENT)
Dept: UROLOGY | Facility: CLINIC | Age: 30
End: 2025-07-11
Payer: COMMERCIAL

## 2025-07-28 DIAGNOSIS — F41.9 ANXIETY: ICD-10-CM

## 2025-07-28 NOTE — TELEPHONE ENCOUNTER
Caller: YEHUDA SANTIAGO    Relationship: Emergency Contact    Best call back number: [phone]     Requested Prescriptions: LESS THEN 3 DAYS LEFT  Requested Prescriptions     Pending Prescriptions Disp Refills    clonazePAM (KlonoPIN) 0.5 MG tablet 60 tablet 0     Sig: Take 1 tablet by mouth 2 (Two) Times a Day.        Pharmacy where request should be sent: [facility] [address] [place] [address] [address] [facility] [phone] [facility] [phone] FX     Last office visit with prescribing clinician: 5/12/2025   Last telemedicine visit with prescribing clinician: Visit date not found   Next office visit with prescribing clinician: 8/11/2025     Does the patient have less than a 3 day supply:   Yes  [x] No[]    Would you like a call back once the refill request has been completed: [] Yes [x] No    If the office needs to give you a call back, can they leave a voicemail: [] Yes [x] No    Colleen Dominguez Rep   07/28/25 10:27 CDT

## 2025-07-29 RX ORDER — CLONAZEPAM 0.5 MG/1
0.5 TABLET ORAL 2 TIMES DAILY
Qty: 60 TABLET | Refills: 0 | Status: SHIPPED | OUTPATIENT
Start: 2025-07-29

## 2025-07-29 NOTE — TELEPHONE ENCOUNTER
Rx Refill Note  Requested Prescriptions     Pending Prescriptions Disp Refills    clonazePAM (KlonoPIN) 0.5 MG tablet 60 tablet 0     Sig: Take 1 tablet by mouth 2 (Two) Times a Day.      Last office visit with office: 5/12/25  Next office visit with office: 8/11/25    UDS: 2/11/25    DATE OF LAST REFILL: 6/30/25    Controlled Substance Agreement: up to date           {TIP  Is Refill Pharmacy correct?:  Violeta Rose MA  07/29/25, 07:58 CDT

## 2025-08-19 ENCOUNTER — TELEPHONE (OUTPATIENT)
Dept: FAMILY MEDICINE CLINIC | Facility: CLINIC | Age: 30
End: 2025-08-19

## 2025-08-26 ENCOUNTER — OFFICE VISIT (OUTPATIENT)
Dept: FAMILY MEDICINE CLINIC | Facility: CLINIC | Age: 30
End: 2025-08-26
Payer: MEDICARE

## 2025-08-26 VITALS
HEIGHT: 68 IN | HEART RATE: 65 BPM | TEMPERATURE: 98.6 F | SYSTOLIC BLOOD PRESSURE: 123 MMHG | DIASTOLIC BLOOD PRESSURE: 86 MMHG | OXYGEN SATURATION: 98 % | WEIGHT: 186.6 LBS | BODY MASS INDEX: 28.28 KG/M2

## 2025-08-26 DIAGNOSIS — K21.9 GASTROESOPHAGEAL REFLUX DISEASE, UNSPECIFIED WHETHER ESOPHAGITIS PRESENT: ICD-10-CM

## 2025-08-26 DIAGNOSIS — I10 PRIMARY HYPERTENSION: Primary | ICD-10-CM

## 2025-08-26 DIAGNOSIS — F41.9 ANXIETY: ICD-10-CM

## 2025-08-26 DIAGNOSIS — E66.3 OVERWEIGHT: ICD-10-CM

## 2025-08-26 DIAGNOSIS — Z98.2 S/P VP SHUNT: ICD-10-CM

## 2025-08-26 PROCEDURE — 3074F SYST BP LT 130 MM HG: CPT | Performed by: NURSE PRACTITIONER

## 2025-08-26 PROCEDURE — 99214 OFFICE O/P EST MOD 30 MIN: CPT | Performed by: NURSE PRACTITIONER

## 2025-08-26 PROCEDURE — 1125F AMNT PAIN NOTED PAIN PRSNT: CPT | Performed by: NURSE PRACTITIONER

## 2025-08-26 PROCEDURE — 3079F DIAST BP 80-89 MM HG: CPT | Performed by: NURSE PRACTITIONER

## 2025-08-26 RX ORDER — UBROGEPANT 100 MG/1
100 TABLET ORAL ONCE AS NEEDED
COMMUNITY
Start: 2025-08-22

## 2025-08-27 LAB
ALBUMIN SERPL-MCNC: 4.6 G/DL (ref 3.5–5.2)
ALBUMIN/GLOB SERPL: 1.7 G/DL
ALP SERPL-CCNC: 105 U/L (ref 39–117)
ALT SERPL-CCNC: 11 U/L (ref 1–41)
AST SERPL-CCNC: 11 U/L (ref 1–40)
BILIRUB SERPL-MCNC: 0.3 MG/DL (ref 0–1.2)
BUN SERPL-MCNC: 12 MG/DL (ref 6–20)
BUN/CREAT SERPL: 14.1 (ref 7–25)
CALCIUM SERPL-MCNC: 9.3 MG/DL (ref 8.6–10.5)
CHLORIDE SERPL-SCNC: 100 MMOL/L (ref 98–107)
CHOLEST SERPL-MCNC: 181 MG/DL (ref 0–200)
CO2 SERPL-SCNC: 26.1 MMOL/L (ref 22–29)
CREAT SERPL-MCNC: 0.85 MG/DL (ref 0.76–1.27)
EGFRCR SERPLBLD CKD-EPI 2021: 119.1 ML/MIN/1.73
GLOBULIN SER CALC-MCNC: 2.7 GM/DL
GLUCOSE SERPL-MCNC: 93 MG/DL (ref 65–99)
HDLC SERPL-MCNC: 52 MG/DL (ref 40–60)
LDLC SERPL CALC-MCNC: 112 MG/DL (ref 0–100)
POTASSIUM SERPL-SCNC: 4.1 MMOL/L (ref 3.5–5.2)
PROT SERPL-MCNC: 7.3 G/DL (ref 6–8.5)
SODIUM SERPL-SCNC: 141 MMOL/L (ref 136–145)
TRIGL SERPL-MCNC: 91 MG/DL (ref 0–150)
VLDLC SERPL CALC-MCNC: 17 MG/DL (ref 5–40)